# Patient Record
Sex: FEMALE | Race: WHITE | Employment: FULL TIME | URBAN - METROPOLITAN AREA
[De-identification: names, ages, dates, MRNs, and addresses within clinical notes are randomized per-mention and may not be internally consistent; named-entity substitution may affect disease eponyms.]

---

## 2018-03-13 ENCOUNTER — TELEPHONE (OUTPATIENT)
Dept: PHYSICAL MEDICINE AND REHAB | Age: 53
End: 2018-03-13

## 2018-03-13 DIAGNOSIS — G43.709 CHRONIC MIGRAINE WITHOUT AURA WITHOUT STATUS MIGRAINOSUS, NOT INTRACTABLE: ICD-10-CM

## 2018-03-13 RX ORDER — TOPIRAMATE 25 MG/1
50 TABLET ORAL DAILY
Qty: 180 TABLET | Refills: 3 | Status: SHIPPED | OUTPATIENT
Start: 2018-03-13 | End: 2019-02-22 | Stop reason: SDUPTHER

## 2018-03-13 RX ORDER — ZOLMITRIPTAN 5 MG/1
5 TABLET, FILM COATED ORAL PRN
Qty: 27 TABLET | Refills: 3 | Status: SHIPPED | OUTPATIENT
Start: 2018-03-13 | End: 2019-01-18 | Stop reason: SDUPTHER

## 2018-03-13 NOTE — TELEPHONE ENCOUNTER
Patient called the office requesting a refill for Topamax 25 mg and Zomig 5mg. Patient was last seen on 12/12/17 Next appt is scheduled for 04/10/18  Last refill for Topamax 25 mg 2 tabs qd #180 with 3 refills was given on 10/11/2017. Last refill for Zomig 5 mg 1 tab prn  #27 with 3 refills was given on 10/11/2017. Patient stated that she has to switch from [facility] to [facility]. She would like the scripts to go to [facility] in [place]. Thank you

## 2018-04-10 ENCOUNTER — OFFICE VISIT (OUTPATIENT)
Dept: PHYSICAL MEDICINE AND REHAB | Age: 53
End: 2018-04-10
Payer: COMMERCIAL

## 2018-04-10 VITALS
HEIGHT: 63 IN | OXYGEN SATURATION: 98 % | HEART RATE: 76 BPM | WEIGHT: 126 LBS | DIASTOLIC BLOOD PRESSURE: 64 MMHG | BODY MASS INDEX: 22.32 KG/M2 | SYSTOLIC BLOOD PRESSURE: 104 MMHG

## 2018-04-10 DIAGNOSIS — G43.839 INTRACTABLE MENSTRUAL MIGRAINE WITHOUT STATUS MIGRAINOSUS: Primary | ICD-10-CM

## 2018-04-10 DIAGNOSIS — G43.709 CHRONIC MIGRAINE WITHOUT AURA WITHOUT STATUS MIGRAINOSUS, NOT INTRACTABLE: ICD-10-CM

## 2018-04-10 DIAGNOSIS — M79.18 MYOFASCIAL MUSCLE PAIN: ICD-10-CM

## 2018-04-10 PROCEDURE — 3014F SCREEN MAMMO DOC REV: CPT | Performed by: PHYSICAL MEDICINE & REHABILITATION

## 2018-04-10 PROCEDURE — G8427 DOCREV CUR MEDS BY ELIG CLIN: HCPCS | Performed by: PHYSICAL MEDICINE & REHABILITATION

## 2018-04-10 PROCEDURE — 4004F PT TOBACCO SCREEN RCVD TLK: CPT | Performed by: PHYSICAL MEDICINE & REHABILITATION

## 2018-04-10 PROCEDURE — 3017F COLORECTAL CA SCREEN DOC REV: CPT | Performed by: PHYSICAL MEDICINE & REHABILITATION

## 2018-04-10 PROCEDURE — G8420 CALC BMI NORM PARAMETERS: HCPCS | Performed by: PHYSICAL MEDICINE & REHABILITATION

## 2018-04-10 PROCEDURE — 99214 OFFICE O/P EST MOD 30 MIN: CPT | Performed by: PHYSICAL MEDICINE & REHABILITATION

## 2018-04-10 RX ORDER — CYCLOBENZAPRINE HCL 10 MG
10 TABLET ORAL 2 TIMES DAILY PRN
Qty: 60 TABLET | Refills: 5 | Status: SHIPPED | OUTPATIENT
Start: 2018-04-10 | End: 2018-10-10 | Stop reason: SDUPTHER

## 2018-05-08 ENCOUNTER — HOSPITAL ENCOUNTER (OUTPATIENT)
Age: 53
Discharge: HOME | End: 2018-05-10
Payer: COMMERCIAL

## 2018-05-08 LAB
ALBUMIN SERPL-MCNC: 4.4 G/DL (ref 3.5–5.2)
ALP BLD-CCNC: 48 U/L (ref 35–104)
ALT SERPL-CCNC: 14 U/L (ref 0–32)
ANION GAP SERPL CALCULATED.3IONS-SCNC: 12 MMOL/L (ref 7–16)
AST SERPL-CCNC: 17 U/L (ref 0–31)
BILIRUB SERPL-MCNC: <0.2 MG/DL (ref 0–1.2)
BUN BLDV-MCNC: 10 MG/DL (ref 6–20)
CALCIUM SERPL-MCNC: 8.9 MG/DL (ref 8.6–10.2)
CHLORIDE BLD-SCNC: 101 MMOL/L (ref 98–107)
CO2: 28 MMOL/L (ref 22–29)
CREAT SERPL-MCNC: 0.6 MG/DL (ref 0.5–1)
GFR AFRICAN AMERICAN: >60
GFR NON-AFRICAN AMERICAN: >60 ML/MIN/1.73
GLUCOSE BLD-MCNC: 93 MG/DL (ref 74–109)
POTASSIUM SERPL-SCNC: 3.8 MMOL/L (ref 3.5–5)
SODIUM BLD-SCNC: 141 MMOL/L (ref 132–146)
TOTAL PROTEIN: 6.4 G/DL (ref 6.4–8.3)

## 2018-05-08 PROCEDURE — 80053 COMPREHEN METABOLIC PANEL: CPT

## 2018-07-16 ENCOUNTER — OFFICE VISIT (OUTPATIENT)
Dept: PHYSICAL MEDICINE AND REHAB | Age: 53
End: 2018-07-16
Payer: COMMERCIAL

## 2018-07-16 VITALS
SYSTOLIC BLOOD PRESSURE: 101 MMHG | HEART RATE: 83 BPM | DIASTOLIC BLOOD PRESSURE: 68 MMHG | BODY MASS INDEX: 21.71 KG/M2 | WEIGHT: 118 LBS | HEIGHT: 62 IN

## 2018-07-16 DIAGNOSIS — M47.812 SPONDYLOSIS OF CERVICAL REGION WITHOUT MYELOPATHY OR RADICULOPATHY: ICD-10-CM

## 2018-07-16 DIAGNOSIS — M54.2 CHRONIC NECK PAIN: ICD-10-CM

## 2018-07-16 DIAGNOSIS — M79.18 MYOFASCIAL MUSCLE PAIN: ICD-10-CM

## 2018-07-16 DIAGNOSIS — G89.29 CHRONIC NECK PAIN: ICD-10-CM

## 2018-07-16 DIAGNOSIS — G43.709 CHRONIC MIGRAINE WITHOUT AURA WITHOUT STATUS MIGRAINOSUS, NOT INTRACTABLE: Primary | ICD-10-CM

## 2018-07-16 PROCEDURE — 4004F PT TOBACCO SCREEN RCVD TLK: CPT | Performed by: PHYSICAL MEDICINE & REHABILITATION

## 2018-07-16 PROCEDURE — 99213 OFFICE O/P EST LOW 20 MIN: CPT | Performed by: PHYSICAL MEDICINE & REHABILITATION

## 2018-07-16 PROCEDURE — 3017F COLORECTAL CA SCREEN DOC REV: CPT | Performed by: PHYSICAL MEDICINE & REHABILITATION

## 2018-07-16 PROCEDURE — G8420 CALC BMI NORM PARAMETERS: HCPCS | Performed by: PHYSICAL MEDICINE & REHABILITATION

## 2018-07-16 PROCEDURE — G8427 DOCREV CUR MEDS BY ELIG CLIN: HCPCS | Performed by: PHYSICAL MEDICINE & REHABILITATION

## 2018-07-16 RX ORDER — BUTALBITAL, ACETAMINOPHEN AND CAFFEINE 300; 40; 50 MG/1; MG/1; MG/1
1 CAPSULE ORAL DAILY PRN
Qty: 10 CAPSULE | Refills: 1 | Status: SHIPPED | OUTPATIENT
Start: 2018-07-16 | End: 2018-11-02 | Stop reason: SDUPTHER

## 2018-07-19 ENCOUNTER — HOSPITAL ENCOUNTER (OUTPATIENT)
Age: 53
Discharge: HOME | End: 2018-07-21

## 2018-07-19 PROCEDURE — 88305 TISSUE EXAM BY PATHOLOGIST: CPT

## 2018-08-02 ENCOUNTER — HOSPITAL ENCOUNTER (OUTPATIENT)
Dept: MAMMOGRAPHY | Age: 53
Discharge: HOME | End: 2018-08-04
Payer: COMMERCIAL

## 2018-08-02 DIAGNOSIS — Z12.39 BREAST CANCER SCREENING: ICD-10-CM

## 2018-08-02 DIAGNOSIS — Z13.820 SCREENING FOR OSTEOPOROSIS: ICD-10-CM

## 2018-08-02 DIAGNOSIS — Z01.419 ENCNTR FOR GYN EXAM (GENERAL) (ROUTINE) W/O ABN FINDINGS: ICD-10-CM

## 2018-08-02 PROCEDURE — 77063 BREAST TOMOSYNTHESIS BI: CPT

## 2018-08-02 PROCEDURE — 77080 DXA BONE DENSITY AXIAL: CPT

## 2018-10-10 ENCOUNTER — OFFICE VISIT (OUTPATIENT)
Dept: PHYSICAL MEDICINE AND REHAB | Age: 53
End: 2018-10-10
Payer: COMMERCIAL

## 2018-10-10 VITALS
DIASTOLIC BLOOD PRESSURE: 73 MMHG | HEART RATE: 80 BPM | WEIGHT: 112 LBS | HEIGHT: 62 IN | SYSTOLIC BLOOD PRESSURE: 105 MMHG | BODY MASS INDEX: 20.61 KG/M2

## 2018-10-10 DIAGNOSIS — G43.709 CHRONIC MIGRAINE WITHOUT AURA WITHOUT STATUS MIGRAINOSUS, NOT INTRACTABLE: Primary | ICD-10-CM

## 2018-10-10 DIAGNOSIS — M79.18 MYOFASCIAL PAIN SYNDROME, CERVICAL: ICD-10-CM

## 2018-10-10 PROCEDURE — 4004F PT TOBACCO SCREEN RCVD TLK: CPT | Performed by: PHYSICAL MEDICINE & REHABILITATION

## 2018-10-10 PROCEDURE — G8427 DOCREV CUR MEDS BY ELIG CLIN: HCPCS | Performed by: PHYSICAL MEDICINE & REHABILITATION

## 2018-10-10 PROCEDURE — 3017F COLORECTAL CA SCREEN DOC REV: CPT | Performed by: PHYSICAL MEDICINE & REHABILITATION

## 2018-10-10 PROCEDURE — G8484 FLU IMMUNIZE NO ADMIN: HCPCS | Performed by: PHYSICAL MEDICINE & REHABILITATION

## 2018-10-10 PROCEDURE — 99213 OFFICE O/P EST LOW 20 MIN: CPT | Performed by: PHYSICAL MEDICINE & REHABILITATION

## 2018-10-10 PROCEDURE — G8420 CALC BMI NORM PARAMETERS: HCPCS | Performed by: PHYSICAL MEDICINE & REHABILITATION

## 2018-10-10 RX ORDER — CYCLOBENZAPRINE HCL 10 MG
10 TABLET ORAL 2 TIMES DAILY PRN
Qty: 60 TABLET | Refills: 5 | Status: SHIPPED | OUTPATIENT
Start: 2018-10-10 | End: 2019-03-25 | Stop reason: SDUPTHER

## 2018-10-10 NOTE — PROGRESS NOTES
bilateral rotation, 30* bilateral lateral flexion, 60* flexion, 20* extension. Negative Spurling. Spinal curvatures are normal.  No spasm. NEURO: Gait is normal. No focal sensorimotor deficit. Reflexes 2+ and symmetric in lower extremities. Impression:   1. Chronic migraine without aura without status migrainosus, not intractable    2. Myofascial pain syndrome, cervical        Plan:  Orders Placed This Encounter   Medications    cyclobenzaprine (FLEXERIL) 10 MG tablet     Sig: Take 1 tablet by mouth 2 times daily as needed for Muscle spasms (may cause drowsiness)     Dispense:  60 tablet     Refill:  5   We discussed starting Aimovig including the side effects, risks and anticipated benefits. She was instructed in the use of the autoinjector by clinical staff. She would like to think about it and let me know. Ultimately anticipate the addition of Wyvonne Shackleton would result in the ability to taper off Topamax depending on effect. Patient will call if she decides she wants to start the Wyvonne Shackleton. Continue current medications  Continue home exercises  The patient was educated about the diagnosis, prognosis, indications, risks and benefits of treatment. An opportunity to ask questions was given to the patient and questions were answered. The patient agreed to proceed with the recommended treatment as described above. Follow up 6 months. Thank you for allowing me to participate in the care of your patient. Nato Gibson D.O., P.T.   Board Certified Physical Medicine and Rehabilitation  Board Certified Electrodiagnostic Medicine

## 2018-10-10 NOTE — PATIENT INSTRUCTIONS
We appreciate that you chose [facility] to provide your healthcare needs today. We took great pleasure in caring for you. You may receive a survey to help us learn how to make your next visit to a [facility] facility better than the last.   Your feedback is important to help us continually improve the service we can provide you. Please take the time to complete it thoughtfully if you receive one as we value the feedback in every survey. In this survey we would appreciate that you answer \"always\" or \"yes\" for as many questions as possible (this is the answer we get credit for doing a good job). If you feel there is a question you cannot answer \"always\" or \"yes\", please let us know before you leave today so we can remedy the situation right away. We look forward to continuing to provide you with excellent care. Considering the survey questions related to access to care, please keep in mind the urgency of your problem (i.e. was it an emergent or urgent visit or more routine)  and whether the urgency of that problem was handled appropriately by our office. We always do our best to prioritize the patients who need the care most urgently given our specialty is in short supply and there is a high demand for visits. Thank you for your time and consideration.

## 2018-11-02 ENCOUNTER — TELEPHONE (OUTPATIENT)
Dept: PHYSICAL MEDICINE AND REHAB | Age: 53
End: 2018-11-02

## 2018-11-02 DIAGNOSIS — G43.709 CHRONIC MIGRAINE WITHOUT AURA WITHOUT STATUS MIGRAINOSUS, NOT INTRACTABLE: ICD-10-CM

## 2018-11-02 RX ORDER — BUTALBITAL, ACETAMINOPHEN AND CAFFEINE 300; 40; 50 MG/1; MG/1; MG/1
1 CAPSULE ORAL DAILY PRN
Qty: 10 CAPSULE | Refills: 1 | Status: SHIPPED | OUTPATIENT
Start: 2018-11-02 | End: 2019-01-21 | Stop reason: SDUPTHER

## 2018-12-17 ENCOUNTER — OFFICE VISIT (OUTPATIENT)
Dept: PHYSICAL MEDICINE AND REHAB | Age: 53
End: 2018-12-17
Payer: COMMERCIAL

## 2018-12-17 VITALS
WEIGHT: 107 LBS | BODY MASS INDEX: 19.69 KG/M2 | HEART RATE: 53 BPM | DIASTOLIC BLOOD PRESSURE: 59 MMHG | SYSTOLIC BLOOD PRESSURE: 106 MMHG | HEIGHT: 62 IN

## 2018-12-17 DIAGNOSIS — G43.709 CHRONIC MIGRAINE WITHOUT AURA WITHOUT STATUS MIGRAINOSUS, NOT INTRACTABLE: Primary | ICD-10-CM

## 2018-12-17 PROCEDURE — G8484 FLU IMMUNIZE NO ADMIN: HCPCS | Performed by: PHYSICAL MEDICINE & REHABILITATION

## 2018-12-17 PROCEDURE — 3017F COLORECTAL CA SCREEN DOC REV: CPT | Performed by: PHYSICAL MEDICINE & REHABILITATION

## 2018-12-17 PROCEDURE — G8420 CALC BMI NORM PARAMETERS: HCPCS | Performed by: PHYSICAL MEDICINE & REHABILITATION

## 2018-12-17 PROCEDURE — G8427 DOCREV CUR MEDS BY ELIG CLIN: HCPCS | Performed by: PHYSICAL MEDICINE & REHABILITATION

## 2018-12-17 PROCEDURE — 4004F PT TOBACCO SCREEN RCVD TLK: CPT | Performed by: PHYSICAL MEDICINE & REHABILITATION

## 2018-12-17 PROCEDURE — 99213 OFFICE O/P EST LOW 20 MIN: CPT | Performed by: PHYSICAL MEDICINE & REHABILITATION

## 2018-12-17 NOTE — PROGRESS NOTES
Bartolo Friend D.O. [facility]  [address] [facility] [place]  Phone: [phone]  Fax: [phone]      12/17/18    Chief Complaint   Patient presents with    Migraine     FOLLOW UP       HPI:  Ledy Gonzales is a 47y.o. year old woman seen today in follow up regarding neck pain. Interval history: Since the last visit the patient was in PT and was doing back stretches and that triggered a migraine that started Dec 7 and lasted until yesterday. Today, the pain is rated Pain Score:   3 where 0 is no pain and 10 is pain as bad as it can be. The pain is located in the neck, does not radiate and is described as stiff, aching. This pain occurs intermittently. The symptoms have been better since onset. Symptoms are exacerbated by turning head. Factors which relieve the pain include Fiorcet, PT, Flexeril, Aleve. Other associated symptoms include stiffness. Otherwise, the pain assessment has not changed since the last visit. Past Medical History:   Diagnosis Date    Endometriosis     Hypertension        Past Surgical History:   Procedure Laterality Date    ABDOMEN SURGERY      X2 Laproscopic for endometriosis    BREAST SURGERY      KNEE SURGERY Bilateral     X5 on each    RHINOPLASTY      WRIST GANGLION EXCISION      LEFT       Social History     Social History    Marital status:      Social History Main Topics    Smoking status: Current Some Day Smoker     Types: Cigarettes    Smokeless tobacco: Never Used    Alcohol use No    Drug use: Unknown    Sexual activity: Not Asked     History reviewed. No pertinent family history.     Current Outpatient Prescriptions   Medication Sig Dispense Refill    butalbital-APAP-caffeine (FIORICET) -40 MG CAPS per capsule Take 1 capsule by mouth daily as needed for Headaches 10 capsule 1    cyclobenzaprine (FLEXERIL) 10 MG tablet Take 1 tablet by mouth 2 times daily as needed for

## 2019-01-15 ENCOUNTER — TELEPHONE (OUTPATIENT)
Dept: PHYSICAL MEDICINE AND REHAB | Age: 54
End: 2019-01-15

## 2019-01-15 ENCOUNTER — NURSE ONLY (OUTPATIENT)
Dept: PHYSICAL MEDICINE AND REHAB | Age: 54
End: 2019-01-15
Payer: COMMERCIAL

## 2019-01-15 DIAGNOSIS — G43.709 CHRONIC MIGRAINE WITHOUT AURA WITHOUT STATUS MIGRAINOSUS, NOT INTRACTABLE: Primary | ICD-10-CM

## 2019-01-15 PROCEDURE — 99211 OFF/OP EST MAY X REQ PHY/QHP: CPT | Performed by: PHYSICAL MEDICINE & REHABILITATION

## 2019-01-15 PROCEDURE — 96372 THER/PROPH/DIAG INJ SC/IM: CPT | Performed by: PHYSICAL MEDICINE & REHABILITATION

## 2019-01-15 RX ORDER — KETOROLAC TROMETHAMINE 15 MG/ML
15 INJECTION, SOLUTION INTRAMUSCULAR; INTRAVENOUS ONCE
Status: COMPLETED | OUTPATIENT
Start: 2019-01-15 | End: 2019-01-15

## 2019-01-15 RX ORDER — KETOROLAC TROMETHAMINE 10 MG/1
10 TABLET, FILM COATED ORAL EVERY 6 HOURS PRN
Qty: 12 TABLET | Refills: 0 | Status: SHIPPED | OUTPATIENT
Start: 2019-01-15 | End: 2019-07-09 | Stop reason: ALTCHOICE

## 2019-01-15 RX ADMIN — KETOROLAC TROMETHAMINE 15 MG: 15 INJECTION, SOLUTION INTRAMUSCULAR; INTRAVENOUS at 16:06

## 2019-01-18 ENCOUNTER — TELEPHONE (OUTPATIENT)
Dept: PHYSICAL MEDICINE AND REHAB | Age: 54
End: 2019-01-18

## 2019-01-18 DIAGNOSIS — G43.709 CHRONIC MIGRAINE WITHOUT AURA WITHOUT STATUS MIGRAINOSUS, NOT INTRACTABLE: ICD-10-CM

## 2019-01-18 RX ORDER — ZOLMITRIPTAN 5 MG/1
5 TABLET, FILM COATED ORAL PRN
Qty: 27 TABLET | Refills: 3 | Status: SHIPPED
Start: 2019-01-18 | End: 2020-02-14 | Stop reason: SDUPTHER

## 2019-01-21 ENCOUNTER — TELEPHONE (OUTPATIENT)
Dept: PHYSICAL MEDICINE AND REHAB | Age: 54
End: 2019-01-21

## 2019-01-21 ENCOUNTER — OFFICE VISIT (OUTPATIENT)
Dept: PHYSICAL MEDICINE AND REHAB | Age: 54
End: 2019-01-21
Payer: COMMERCIAL

## 2019-01-21 VITALS
HEIGHT: 62 IN | DIASTOLIC BLOOD PRESSURE: 60 MMHG | OXYGEN SATURATION: 99 % | WEIGHT: 110.6 LBS | BODY MASS INDEX: 20.35 KG/M2 | HEART RATE: 104 BPM | SYSTOLIC BLOOD PRESSURE: 110 MMHG

## 2019-01-21 DIAGNOSIS — R11.0 NAUSEA: ICD-10-CM

## 2019-01-21 DIAGNOSIS — G43.709 CHRONIC MIGRAINE WITHOUT AURA WITHOUT STATUS MIGRAINOSUS, NOT INTRACTABLE: Primary | ICD-10-CM

## 2019-01-21 PROCEDURE — 4004F PT TOBACCO SCREEN RCVD TLK: CPT | Performed by: PHYSICAL MEDICINE & REHABILITATION

## 2019-01-21 PROCEDURE — 3017F COLORECTAL CA SCREEN DOC REV: CPT | Performed by: PHYSICAL MEDICINE & REHABILITATION

## 2019-01-21 PROCEDURE — G8420 CALC BMI NORM PARAMETERS: HCPCS | Performed by: PHYSICAL MEDICINE & REHABILITATION

## 2019-01-21 PROCEDURE — G8427 DOCREV CUR MEDS BY ELIG CLIN: HCPCS | Performed by: PHYSICAL MEDICINE & REHABILITATION

## 2019-01-21 PROCEDURE — G8484 FLU IMMUNIZE NO ADMIN: HCPCS | Performed by: PHYSICAL MEDICINE & REHABILITATION

## 2019-01-21 PROCEDURE — 99214 OFFICE O/P EST MOD 30 MIN: CPT | Performed by: PHYSICAL MEDICINE & REHABILITATION

## 2019-01-21 RX ORDER — METOCLOPRAMIDE 10 MG/1
10 TABLET ORAL DAILY PRN
Qty: 30 TABLET | Refills: 2 | Status: SHIPPED | OUTPATIENT
Start: 2019-01-21 | End: 2019-07-09 | Stop reason: SDUPTHER

## 2019-01-21 RX ORDER — BUTALBITAL, ACETAMINOPHEN AND CAFFEINE 300; 40; 50 MG/1; MG/1; MG/1
1 CAPSULE ORAL DAILY PRN
Qty: 10 CAPSULE | Refills: 1 | Status: SHIPPED | OUTPATIENT
Start: 2019-01-21 | End: 2019-04-10 | Stop reason: SDUPTHER

## 2019-01-22 ENCOUNTER — TELEPHONE (OUTPATIENT)
Dept: PHYSICAL MEDICINE AND REHAB | Age: 54
End: 2019-01-22

## 2019-02-15 ENCOUNTER — TELEPHONE (OUTPATIENT)
Dept: PHYSICAL MEDICINE AND REHAB | Age: 54
End: 2019-02-15

## 2019-02-18 ENCOUNTER — TELEPHONE (OUTPATIENT)
Dept: PHYSICAL MEDICINE AND REHAB | Age: 54
End: 2019-02-18

## 2019-02-18 RX ORDER — AMITRIPTYLINE HYDROCHLORIDE 10 MG/1
10 TABLET, FILM COATED ORAL NIGHTLY
Qty: 30 TABLET | Refills: 0 | Status: SHIPPED | OUTPATIENT
Start: 2019-02-18 | End: 2019-03-15 | Stop reason: SDUPTHER

## 2019-02-22 ENCOUNTER — TELEPHONE (OUTPATIENT)
Dept: PHYSICAL MEDICINE AND REHAB | Age: 54
End: 2019-02-22

## 2019-02-22 RX ORDER — TOPIRAMATE 25 MG/1
50 TABLET ORAL DAILY
Qty: 180 TABLET | Refills: 3 | Status: SHIPPED | OUTPATIENT
Start: 2019-02-22 | End: 2019-07-09 | Stop reason: ALTCHOICE

## 2019-03-06 ENCOUNTER — HOSPITAL ENCOUNTER (OUTPATIENT)
Age: 54
Discharge: HOME | End: 2019-03-08
Payer: COMMERCIAL

## 2019-03-06 LAB
ALBUMIN SERPL-MCNC: 4.3 G/DL (ref 3.5–5.2)
ALP BLD-CCNC: 42 U/L (ref 35–104)
ALT SERPL-CCNC: 59 U/L (ref 0–32)
ANION GAP SERPL CALCULATED.3IONS-SCNC: 14 MMOL/L (ref 7–16)
AST SERPL-CCNC: 60 U/L (ref 0–31)
BASOPHILS ABSOLUTE: 0.04 E9/L (ref 0–0.2)
BASOPHILS RELATIVE PERCENT: 0.6 % (ref 0–2)
BILIRUB SERPL-MCNC: <0.2 MG/DL (ref 0–1.2)
BUN BLDV-MCNC: 12 MG/DL (ref 6–20)
CALCIUM SERPL-MCNC: 9 MG/DL (ref 8.6–10.2)
CHLORIDE BLD-SCNC: 100 MMOL/L (ref 98–107)
CO2: 25 MMOL/L (ref 22–29)
CREAT SERPL-MCNC: 0.7 MG/DL (ref 0.5–1)
EOSINOPHILS ABSOLUTE: 0.13 E9/L (ref 0.05–0.5)
EOSINOPHILS RELATIVE PERCENT: 1.9 % (ref 0–6)
ESTRADIOL LEVEL: 124.9 PG/ML
FOLLICLE STIMULATING HORMONE: 24.2 MIU/ML
GFR AFRICAN AMERICAN: >60
GFR NON-AFRICAN AMERICAN: >60 ML/MIN/1.73
GLUCOSE BLD-MCNC: 87 MG/DL (ref 74–99)
HCT VFR BLD CALC: 34.9 % (ref 34–48)
HEMOGLOBIN: 11.6 G/DL (ref 11.5–15.5)
IMMATURE GRANULOCYTES #: 0.01 E9/L
IMMATURE GRANULOCYTES %: 0.1 % (ref 0–5)
IRON SATURATION: 23 % (ref 15–50)
IRON: 70 MCG/DL (ref 37–145)
LUTEINIZING HORMONE: 5.8 MIU/ML
LYMPHOCYTES ABSOLUTE: 2.84 E9/L (ref 1.5–4)
LYMPHOCYTES RELATIVE PERCENT: 42.4 % (ref 20–42)
MCH RBC QN AUTO: 30.1 PG (ref 26–35)
MCHC RBC AUTO-ENTMCNC: 33.2 % (ref 32–34.5)
MCV RBC AUTO: 90.4 FL (ref 80–99.9)
MONOCYTES ABSOLUTE: 0.48 E9/L (ref 0.1–0.95)
MONOCYTES RELATIVE PERCENT: 7.2 % (ref 2–12)
NEUTROPHILS ABSOLUTE: 3.2 E9/L (ref 1.8–7.3)
NEUTROPHILS RELATIVE PERCENT: 47.8 % (ref 43–80)
PDW BLD-RTO: 13.1 FL (ref 11.5–15)
PLATELET # BLD: 312 E9/L (ref 130–450)
PMV BLD AUTO: 9.6 FL (ref 7–12)
POTASSIUM SERPL-SCNC: 3.6 MMOL/L (ref 3.5–5)
RBC # BLD: 3.86 E12/L (ref 3.5–5.5)
SODIUM BLD-SCNC: 139 MMOL/L (ref 132–146)
TOTAL IRON BINDING CAPACITY: 305 MCG/DL (ref 250–450)
TOTAL PROTEIN: 6.4 G/DL (ref 6.4–8.3)
WBC # BLD: 6.7 E9/L (ref 4.5–11.5)

## 2019-03-06 PROCEDURE — 83550 IRON BINDING TEST: CPT

## 2019-03-06 PROCEDURE — 80053 COMPREHEN METABOLIC PANEL: CPT

## 2019-03-06 PROCEDURE — 83540 ASSAY OF IRON: CPT

## 2019-03-06 PROCEDURE — 83001 ASSAY OF GONADOTROPIN (FSH): CPT

## 2019-03-06 PROCEDURE — 83002 ASSAY OF GONADOTROPIN (LH): CPT

## 2019-03-06 PROCEDURE — 85025 COMPLETE CBC W/AUTO DIFF WBC: CPT

## 2019-03-06 PROCEDURE — 82670 ASSAY OF TOTAL ESTRADIOL: CPT

## 2019-03-15 ENCOUNTER — TELEPHONE (OUTPATIENT)
Dept: PHYSICAL MEDICINE AND REHAB | Age: 54
End: 2019-03-15

## 2019-03-15 RX ORDER — AMITRIPTYLINE HYDROCHLORIDE 10 MG/1
10 TABLET, FILM COATED ORAL NIGHTLY
Qty: 90 TABLET | Refills: 0 | Status: SHIPPED | OUTPATIENT
Start: 2019-03-15 | End: 2019-06-10 | Stop reason: SDUPTHER

## 2019-03-25 RX ORDER — CYCLOBENZAPRINE HCL 10 MG
10 TABLET ORAL 2 TIMES DAILY PRN
Qty: 60 TABLET | Refills: 5 | Status: SHIPPED | OUTPATIENT
Start: 2019-03-25 | End: 2019-08-22 | Stop reason: SDUPTHER

## 2019-04-10 ENCOUNTER — OFFICE VISIT (OUTPATIENT)
Dept: PHYSICAL MEDICINE AND REHAB | Age: 54
End: 2019-04-10
Payer: COMMERCIAL

## 2019-04-10 VITALS
DIASTOLIC BLOOD PRESSURE: 73 MMHG | HEIGHT: 62 IN | WEIGHT: 110 LBS | SYSTOLIC BLOOD PRESSURE: 106 MMHG | BODY MASS INDEX: 20.24 KG/M2 | HEART RATE: 83 BPM

## 2019-04-10 DIAGNOSIS — G43.709 CHRONIC MIGRAINE WITHOUT AURA WITHOUT STATUS MIGRAINOSUS, NOT INTRACTABLE: ICD-10-CM

## 2019-04-10 DIAGNOSIS — M79.18 MYOFASCIAL PAIN SYNDROME, CERVICAL: Primary | ICD-10-CM

## 2019-04-10 PROCEDURE — G8420 CALC BMI NORM PARAMETERS: HCPCS | Performed by: PHYSICAL MEDICINE & REHABILITATION

## 2019-04-10 PROCEDURE — 4004F PT TOBACCO SCREEN RCVD TLK: CPT | Performed by: PHYSICAL MEDICINE & REHABILITATION

## 2019-04-10 PROCEDURE — 99214 OFFICE O/P EST MOD 30 MIN: CPT | Performed by: PHYSICAL MEDICINE & REHABILITATION

## 2019-04-10 PROCEDURE — 3017F COLORECTAL CA SCREEN DOC REV: CPT | Performed by: PHYSICAL MEDICINE & REHABILITATION

## 2019-04-10 PROCEDURE — G8427 DOCREV CUR MEDS BY ELIG CLIN: HCPCS | Performed by: PHYSICAL MEDICINE & REHABILITATION

## 2019-04-10 RX ORDER — BUTALBITAL, ACETAMINOPHEN AND CAFFEINE 300; 40; 50 MG/1; MG/1; MG/1
1 CAPSULE ORAL DAILY PRN
Qty: 10 CAPSULE | Refills: 1 | Status: SHIPPED | OUTPATIENT
Start: 2019-04-10 | End: 2019-06-18 | Stop reason: SDUPTHER

## 2019-04-10 NOTE — PROGRESS NOTES
RESPIRATORY: Respirations are regular and unlabored. There is no cyanosis. GASTROINTESTINAL: Soft abdomen, non-tender. MSK: There is no joint effusion, deformity, instability, swelling, erythema or warmth. Cervical AROM is 60* bilateral rotation, 30* bilateral lateral flexion, 60* flexion, 20* extension. Negative Spurling. Spinal curvatures are normal.  No spasm. NEURO: Gait is normal. No focal sensorimotor deficit. Reflexes 2+ and symmetric in lower extremities. Impression:   1. Myofascial pain syndrome, cervical    2. Chronic migraine without aura without status migrainosus, not intractable        Plan:   Increase Topamax to 50 qhs and then continue 25 mg in the morning. Orders Placed This Encounter   Medications    butalbital-APAP-caffeine (FIORICET) -40 MG CAPS per capsule     Sig: Take 1 capsule by mouth daily as needed for Headaches     Dispense:  10 capsule     Refill:  1   PA Botox migraine  Otherwise, continue current medications     Continue home exercises  The patient was educated about the diagnosis, prognosis, indications, risks and benefits of treatment. An opportunity to ask questions was given to the patient and questions were answered. The patient agreed to proceed with the recommended treatment as described above. Follow up 3 months  Thank you for allowing me to participate in the care of your patient. Abbe Sacks, D.O., P.T.   Board Certified Physical Medicine and Rehabilitation  Board Certified Electrodiagnostic Medicine

## 2019-04-10 NOTE — PATIENT INSTRUCTIONS
We appreciate that you chose [facility] to provide your healthcare needs today. We took great pleasure in caring for you. You may receive a survey to help us learn how to make your next visit to a [facility] facility better than the last.   Your feedback is important to help us continually improve the service we can provide you. Please take the time to complete it thoughtfully if you receive one as we value the feedback in every survey. In this survey we would appreciate that you answer \"always\" or \"yes\" for as many questions as possible (this is the answer we get credit for doing a good job). If you feel there is a question you cannot answer \"always\" or \"yes\", please let us know before you leave today so we can remedy the situation right away. We look forward to continuing to provide you with excellent care. Considering the survey questions related to access to care, please keep in mind the urgency of your problem (i.e. was it an emergent or urgent visit or more routine)  and whether the urgency of that problem was handled appropriately by our office. We always do our best to prioritize the patients who need the care most urgently given our specialty is in short supply and there is a high demand for visits. Thank you for your time and consideration.

## 2019-04-29 ENCOUNTER — TELEPHONE (OUTPATIENT)
Dept: PHYSICAL MEDICINE AND REHAB | Age: 54
End: 2019-04-29

## 2019-05-02 ENCOUNTER — TELEPHONE (OUTPATIENT)
Dept: PHYSICAL MEDICINE AND REHAB | Age: 54
End: 2019-05-02

## 2019-05-10 ENCOUNTER — HOSPITAL ENCOUNTER (OUTPATIENT)
Age: 54
Discharge: HOME | End: 2019-05-12

## 2019-05-10 LAB
HCT VFR BLD CALC: 33.6 % (ref 34–48)
HEMOGLOBIN: 10.9 G/DL (ref 11.5–15.5)
MCH RBC QN AUTO: 28.1 PG (ref 26–35)
MCHC RBC AUTO-ENTMCNC: 32.4 % (ref 32–34.5)
MCV RBC AUTO: 86.6 FL (ref 80–99.9)
PDW BLD-RTO: 13.9 FL (ref 11.5–15)
PLATELET # BLD: 327 E9/L (ref 130–450)
PMV BLD AUTO: 9.1 FL (ref 7–12)
RBC # BLD: 3.88 E12/L (ref 3.5–5.5)
WBC # BLD: 5.2 E9/L (ref 4.5–11.5)

## 2019-05-10 PROCEDURE — 86901 BLOOD TYPING SEROLOGIC RH(D): CPT

## 2019-05-10 PROCEDURE — 86900 BLOOD TYPING SEROLOGIC ABO: CPT

## 2019-05-10 PROCEDURE — 85027 COMPLETE CBC AUTOMATED: CPT

## 2019-05-10 PROCEDURE — 86850 RBC ANTIBODY SCREEN: CPT

## 2019-05-10 NOTE — TELEPHONE ENCOUNTER
We have received Botox 200 units from [facility]. I called the patient and informed her of this.  Appointment is scheduled for 5/24/19 at 11:45am.

## 2019-05-11 LAB
ABO/RH: NORMAL
ANTIBODY SCREEN: NORMAL

## 2019-05-14 ENCOUNTER — HOSPITAL ENCOUNTER (OUTPATIENT)
Age: 54
Discharge: HOME | End: 2019-05-16

## 2019-05-14 PROCEDURE — 88307 TISSUE EXAM BY PATHOLOGIST: CPT

## 2019-05-15 ENCOUNTER — HOSPITAL ENCOUNTER (OUTPATIENT)
Age: 54
Discharge: HOME | End: 2019-05-17

## 2019-05-15 LAB
ANION GAP SERPL CALCULATED.3IONS-SCNC: 8 MMOL/L (ref 7–16)
BUN BLDV-MCNC: 9 MG/DL (ref 6–20)
CALCIUM SERPL-MCNC: 8.6 MG/DL (ref 8.6–10.2)
CHLORIDE BLD-SCNC: 107 MMOL/L (ref 98–107)
CO2: 25 MMOL/L (ref 22–29)
CREAT SERPL-MCNC: 0.6 MG/DL (ref 0.5–1)
GFR AFRICAN AMERICAN: >60
GFR NON-AFRICAN AMERICAN: >60 ML/MIN/1.73
GLUCOSE BLD-MCNC: 142 MG/DL (ref 74–99)
HCT VFR BLD CALC: 33.7 % (ref 34–48)
HEMOGLOBIN: 10.5 G/DL (ref 11.5–15.5)
MCH RBC QN AUTO: 27.9 PG (ref 26–35)
MCHC RBC AUTO-ENTMCNC: 31.2 % (ref 32–34.5)
MCV RBC AUTO: 89.4 FL (ref 80–99.9)
PDW BLD-RTO: 14.2 FL (ref 11.5–15)
PLATELET # BLD: 288 E9/L (ref 130–450)
PMV BLD AUTO: 9.6 FL (ref 7–12)
POTASSIUM SERPL-SCNC: 3.1 MMOL/L (ref 3.5–5)
RBC # BLD: 3.77 E12/L (ref 3.5–5.5)
SODIUM BLD-SCNC: 140 MMOL/L (ref 132–146)
WBC # BLD: 11.1 E9/L (ref 4.5–11.5)

## 2019-05-15 PROCEDURE — 85027 COMPLETE CBC AUTOMATED: CPT

## 2019-05-15 PROCEDURE — 80048 BASIC METABOLIC PNL TOTAL CA: CPT

## 2019-05-24 ENCOUNTER — OFFICE VISIT (OUTPATIENT)
Dept: PHYSICAL MEDICINE AND REHAB | Age: 54
End: 2019-05-24
Payer: COMMERCIAL

## 2019-05-24 VITALS
DIASTOLIC BLOOD PRESSURE: 82 MMHG | TEMPERATURE: 97.5 F | WEIGHT: 106 LBS | HEIGHT: 62 IN | HEART RATE: 89 BPM | SYSTOLIC BLOOD PRESSURE: 116 MMHG | BODY MASS INDEX: 19.51 KG/M2

## 2019-05-24 DIAGNOSIS — G43.709 CHRONIC MIGRAINE WITHOUT AURA WITHOUT STATUS MIGRAINOSUS, NOT INTRACTABLE: Primary | ICD-10-CM

## 2019-05-24 DIAGNOSIS — M79.18 MYOFASCIAL PAIN SYNDROME, CERVICAL: ICD-10-CM

## 2019-05-24 PROCEDURE — 64615 CHEMODENERV MUSC MIGRAINE: CPT | Performed by: PHYSICAL MEDICINE & REHABILITATION

## 2019-05-24 RX ORDER — MELOXICAM 7.5 MG/1
7.5 TABLET ORAL DAILY
Refills: 0 | COMMUNITY
Start: 2019-05-10 | End: 2019-11-12 | Stop reason: DRUGHIGH

## 2019-05-24 RX ORDER — HYDROCODONE BITARTRATE AND ACETAMINOPHEN 5; 325 MG/1; MG/1
1 TABLET ORAL EVERY 6 HOURS
Refills: 0 | COMMUNITY
Start: 2019-05-15 | End: 2019-11-12

## 2019-05-24 NOTE — PROGRESS NOTES
Abbe Sacks, D.O. [facility]  [address] [facility] [place]  Phone: [phone]  Fax: [phone]    5/24/2019    Chief Complaint   Patient presents with    Migraine     Botox 200 units specialty pharmacy       · Informed Consent:  The indications, risks, benefits, and  alternatives of onabotulinum toxin A were discussed with the patient. I explained to the patient the potential side effects including but not limited to: distant spread of toxin effect causing droopy eyelids, facial drooping, neck pain, headache, double vision, muscle pain/spasm/weakness/stiffness,  bronchitis,  injection site pain, increased blood pressure, hypersensitivity, anaphylaxis, difficulty swallowing, difficulty speaking, urinary incontinence and breathing difficulty. The patient is aware that swallowing and breathing difficulties can be life threatening and there have been reports of death in some cases where onabotulinum toxin was injected. The patient was advised of the expected benefit to include less headache days per month, and the anticipated duration of effect of 3 months. A permit was signed and scanned into the media. · Last injection: n/a  · Taking anticoagulants/antiplatelets: No  · Diabetic: No  · Febrile/active infection: No    · Procedure note: After obtaining verbal and written consent from the patient for injection of  onabotulinum toxin A the patient agreed to proceed. 200 units of onabotulinum toxin A was reconstituted using 4 cc of preservative free normal saline ( 5 units per 0.1 ml). The medication was injected using a 30 g 0.5\" needle and a 1 cc syringe. The skin was prepared with Betadine.   Using a no touch technique, the injections were carried out at the following sites: bilateral  10 units divided at 2 sites, Procerus 5 units at one site, bilateral Frontalis 10 units at 4 sites, bilateral Temporalis 40 units divided at 8 sites, bilateral Occipitalis 30 units divided at 6 sites, bilateral cervical paraspinals 20 units divided at 4 sites, and bilateral trapezius 30 units divided at 6 sites. The total was 145 units and as a result there was 45 units of unavoidable waste. Adequate hemostasis was obtained. Ice was applied for 20 minutes post injection. The patient tolerated the procedure well. There were no complications. The patient was educated in post-procedure care including ice 20 minutes on/20 minutes off prn pain/bruising/swelling, advised to avoid hats and rubbing the forehead for 1-2 days and to call if any fevers chills, night sweats, drainage, increased pain, weakness, difficulty breathing or swallowing. Patient advised to expect improvements in about 2 weeks.  follow up 6 weeks      Paulo Ness D.O., P.T.   Board Certified Physical Medicine and Rehabilitation  Board Certified Electrodiagnostic Medicine    Administrations This Visit     Onabotulinumtoxin A (BOTOX (COSMETIC)) injection 200 Units     Admin Date  05/24/2019  13:01 Action  Given Dose  200 Units Route  Intramuscular Site  Other Administered By  Miguel Tapia MA    Ordering Provider:  Thomas Jack DO    NDC:  7025-8199-71    Lot#:  [id number]    :  Michelle Blue    Patient Supplied?:  Yes    Comments:  EXP: 12/2021

## 2019-05-28 ENCOUNTER — TELEPHONE (OUTPATIENT)
Dept: PHYSICAL MEDICINE AND REHAB | Age: 54
End: 2019-05-28

## 2019-05-28 NOTE — TELEPHONE ENCOUNTER
Patient called stating that she had botox injections Friday 5-24-19. She is stating that the only place she is having any problems is in the back of her neck and that it feels stiff, is swollen on the right side, and has been giving her headaches. She would like to know if this is normal until the botox sets in. She also would like to know if she is able to have a laser treatment done on her face on Friday for her skin or if that will effect the botox. Please advise.

## 2019-05-28 NOTE — TELEPHONE ENCOUNTER
The patient stated there is no pus or drainage or open areas and that she does not have any fever or chills. I let her know to apply ice 20 minutes on, 20 minutes off. Patient also stated that her physician doing the laser skin treatment already knows that she had the botox.

## 2019-05-28 NOTE — TELEPHONE ENCOUNTER
Apply ice to sore areas 20 minutes on 20 minutes off and just verify no pus draining, fevers or chills. It should not affect the laser skin treatment but just make sure she informs the physician performing that procedure as well.

## 2019-06-10 RX ORDER — AMITRIPTYLINE HYDROCHLORIDE 10 MG/1
10 TABLET, FILM COATED ORAL NIGHTLY
Qty: 90 TABLET | Refills: 0 | Status: SHIPPED | OUTPATIENT
Start: 2019-06-10 | End: 2019-07-09 | Stop reason: ALTCHOICE

## 2019-06-10 NOTE — TELEPHONE ENCOUNTER
Patient last visit 5-24-19 next visit 7-9-19. Pharmacy sent a fax refill request for Amitriptyline 10 mg 1 tab qhs sent 3-15-19 #90 no refills. Please advise.

## 2019-06-18 DIAGNOSIS — G43.709 CHRONIC MIGRAINE WITHOUT AURA WITHOUT STATUS MIGRAINOSUS, NOT INTRACTABLE: ICD-10-CM

## 2019-06-18 RX ORDER — BUTALBITAL, ACETAMINOPHEN AND CAFFEINE 300; 40; 50 MG/1; MG/1; MG/1
1 CAPSULE ORAL DAILY PRN
Qty: 14 CAPSULE | Refills: 0 | Status: SHIPPED | OUTPATIENT
Start: 2019-06-18 | End: 2019-07-09 | Stop reason: SDUPTHER

## 2019-06-18 NOTE — TELEPHONE ENCOUNTER
Patient called requesting a refill on Fioricet sent 4-10-19 #10 1 refill. Patient last visit 5-24-19 next visit 7-9-19. Informed the patient that we will send over to her pharmacy enough to last her till her appointment and that from now on she will have to get all scripts at appointment time. Will send over script to [facility].

## 2019-07-09 ENCOUNTER — OFFICE VISIT (OUTPATIENT)
Dept: PHYSICAL MEDICINE AND REHAB | Age: 54
End: 2019-07-09
Payer: COMMERCIAL

## 2019-07-09 VITALS
SYSTOLIC BLOOD PRESSURE: 125 MMHG | HEIGHT: 62 IN | WEIGHT: 107 LBS | BODY MASS INDEX: 19.69 KG/M2 | DIASTOLIC BLOOD PRESSURE: 72 MMHG | HEART RATE: 97 BPM

## 2019-07-09 DIAGNOSIS — M47.812 SPONDYLOSIS OF CERVICAL REGION WITHOUT MYELOPATHY OR RADICULOPATHY: ICD-10-CM

## 2019-07-09 DIAGNOSIS — R11.0 NAUSEA: ICD-10-CM

## 2019-07-09 DIAGNOSIS — G43.709 CHRONIC MIGRAINE WITHOUT AURA WITHOUT STATUS MIGRAINOSUS, NOT INTRACTABLE: Primary | ICD-10-CM

## 2019-07-09 DIAGNOSIS — M79.18 MYOFASCIAL PAIN SYNDROME, CERVICAL: ICD-10-CM

## 2019-07-09 PROCEDURE — 4004F PT TOBACCO SCREEN RCVD TLK: CPT | Performed by: PHYSICAL MEDICINE & REHABILITATION

## 2019-07-09 PROCEDURE — 99214 OFFICE O/P EST MOD 30 MIN: CPT | Performed by: PHYSICAL MEDICINE & REHABILITATION

## 2019-07-09 PROCEDURE — G8420 CALC BMI NORM PARAMETERS: HCPCS | Performed by: PHYSICAL MEDICINE & REHABILITATION

## 2019-07-09 PROCEDURE — 3017F COLORECTAL CA SCREEN DOC REV: CPT | Performed by: PHYSICAL MEDICINE & REHABILITATION

## 2019-07-09 PROCEDURE — G8427 DOCREV CUR MEDS BY ELIG CLIN: HCPCS | Performed by: PHYSICAL MEDICINE & REHABILITATION

## 2019-07-09 RX ORDER — METOCLOPRAMIDE 10 MG/1
10 TABLET ORAL DAILY PRN
Qty: 30 TABLET | Refills: 2 | Status: SHIPPED
Start: 2019-07-09 | End: 2020-06-09 | Stop reason: ALTCHOICE

## 2019-07-09 RX ORDER — TOPIRAMATE 25 MG/1
TABLET ORAL
Qty: 270 TABLET | Refills: 3 | Status: SHIPPED
Start: 2019-07-09 | End: 2020-05-12

## 2019-07-09 RX ORDER — BUTALBITAL, ACETAMINOPHEN AND CAFFEINE 300; 40; 50 MG/1; MG/1; MG/1
1 CAPSULE ORAL DAILY PRN
Qty: 14 CAPSULE | Refills: 0 | Status: SHIPPED | OUTPATIENT
Start: 2019-07-09 | End: 2019-08-22 | Stop reason: SDUPTHER

## 2019-07-25 ENCOUNTER — TELEPHONE (OUTPATIENT)
Dept: PHYSICAL MEDICINE AND REHAB | Age: 54
End: 2019-07-25

## 2019-07-26 ENCOUNTER — TELEPHONE (OUTPATIENT)
Dept: ADMINISTRATIVE | Age: 54
End: 2019-07-26

## 2019-07-26 NOTE — TELEPHONE ENCOUNTER
Per yesterday's note you stated \"I discussed with Dr Leodan Cruz. She said only to take it for 3 days max, and not to take any other NSAIDs (i.e. Ibu, Meloxicam, Naproxen, etc.) while taking toradol. \"  Do you want her to engage in PT at this time or hold off till evaluated in the office at the end of August?

## 2019-07-26 NOTE — TELEPHONE ENCOUNTER
I would like to see her first. She would be ok continuing her home exercises until then as it will most likely get better on its own.

## 2019-08-13 ENCOUNTER — TELEPHONE (OUTPATIENT)
Dept: PHYSICAL MEDICINE AND REHAB | Age: 54
End: 2019-08-13

## 2019-08-15 ENCOUNTER — TELEPHONE (OUTPATIENT)
Dept: PHYSICAL MEDICINE AND REHAB | Age: 54
End: 2019-08-15

## 2019-08-16 ENCOUNTER — HOSPITAL ENCOUNTER (OUTPATIENT)
Dept: MAMMOGRAPHY | Age: 54
Discharge: HOME | End: 2019-08-18
Payer: COMMERCIAL

## 2019-08-16 DIAGNOSIS — Z12.39 BREAST CANCER SCREENING: ICD-10-CM

## 2019-08-16 PROCEDURE — 77063 BREAST TOMOSYNTHESIS BI: CPT

## 2019-08-22 ENCOUNTER — OFFICE VISIT (OUTPATIENT)
Dept: PHYSICAL MEDICINE AND REHAB | Age: 54
End: 2019-08-22
Payer: COMMERCIAL

## 2019-08-22 VITALS
TEMPERATURE: 98 F | SYSTOLIC BLOOD PRESSURE: 109 MMHG | DIASTOLIC BLOOD PRESSURE: 72 MMHG | WEIGHT: 109 LBS | BODY MASS INDEX: 20.06 KG/M2 | HEART RATE: 89 BPM | HEIGHT: 62 IN

## 2019-08-22 DIAGNOSIS — G43.709 CHRONIC MIGRAINE WITHOUT AURA WITHOUT STATUS MIGRAINOSUS, NOT INTRACTABLE: ICD-10-CM

## 2019-08-22 PROCEDURE — 64615 CHEMODENERV MUSC MIGRAINE: CPT | Performed by: PHYSICAL MEDICINE & REHABILITATION

## 2019-08-22 RX ORDER — CYCLOBENZAPRINE HCL 10 MG
10 TABLET ORAL 2 TIMES DAILY PRN
Qty: 180 TABLET | Refills: 3 | Status: SHIPPED | OUTPATIENT
Start: 2019-08-22 | End: 2019-12-04

## 2019-08-22 RX ORDER — BUTALBITAL, ACETAMINOPHEN AND CAFFEINE 300; 40; 50 MG/1; MG/1; MG/1
1 CAPSULE ORAL DAILY PRN
Qty: 14 CAPSULE | Refills: 2 | Status: SHIPPED | OUTPATIENT
Start: 2019-08-22 | End: 2019-10-08 | Stop reason: SDUPTHER

## 2019-08-22 NOTE — PROGRESS NOTES
30 units divided at 6 sites, bilateral cervical paraspinals 20 units divided at 4 sites, and bilateral trapezius 60 units divided at 6 sites. The total was 185 units and as a result there was 15 units of unavoidable waste. Adequate hemostasis was obtained. Ice was applied for 20 minutes post injection. The patient tolerated the procedure well. There were no complications. The patient was educated in post-procedure care including ice 20 minutes on/20 minutes off prn pain/bruising/swelling, advised to avoid hats and rubbing the forehead for 1-2 days and to call if any fevers chills, night sweats, drainage, increased pain, weakness, difficulty breathing or swallowing. Patient advised to expect improvements in about 2 weeks.  follow up 6 weeks      Ryder Minor D.O., P.T.   Board Certified Physical Medicine and Rehabilitation  Board Certified Electrodiagnostic Medicine    Administrations This Visit     Onabotulinumtoxin A (BOTOX (COSMETIC)) injection 200 Units     Admin Date  08/22/2019  11:43 Action  Given Dose  200 Units Route  Intramuscular Site  Other Administered By  Sabas Toledo MA    Ordering Provider:  Freeman Rogers DO    NDC:  5984-7200-95    Lot#:  [id number]    :  Srini Contreras    Patient Supplied?:  Yes    Comments:  EXP:  02/2022

## 2019-10-07 ENCOUNTER — OFFICE VISIT (OUTPATIENT)
Dept: PHYSICAL MEDICINE AND REHAB | Age: 54
End: 2019-10-07
Payer: COMMERCIAL

## 2019-10-07 VITALS
BODY MASS INDEX: 20.43 KG/M2 | SYSTOLIC BLOOD PRESSURE: 113 MMHG | WEIGHT: 111 LBS | DIASTOLIC BLOOD PRESSURE: 76 MMHG | HEIGHT: 62 IN | HEART RATE: 93 BPM

## 2019-10-07 DIAGNOSIS — R11.0 NAUSEA: ICD-10-CM

## 2019-10-07 DIAGNOSIS — G43.709 CHRONIC MIGRAINE WITHOUT AURA WITHOUT STATUS MIGRAINOSUS, NOT INTRACTABLE: ICD-10-CM

## 2019-10-07 DIAGNOSIS — M47.812 SPONDYLOSIS OF CERVICAL REGION WITHOUT MYELOPATHY OR RADICULOPATHY: ICD-10-CM

## 2019-10-07 DIAGNOSIS — M79.18 MYOFASCIAL PAIN SYNDROME, CERVICAL: Primary | ICD-10-CM

## 2019-10-07 PROCEDURE — G8427 DOCREV CUR MEDS BY ELIG CLIN: HCPCS | Performed by: PHYSICAL MEDICINE & REHABILITATION

## 2019-10-07 PROCEDURE — 4004F PT TOBACCO SCREEN RCVD TLK: CPT | Performed by: PHYSICAL MEDICINE & REHABILITATION

## 2019-10-07 PROCEDURE — 3017F COLORECTAL CA SCREEN DOC REV: CPT | Performed by: PHYSICAL MEDICINE & REHABILITATION

## 2019-10-07 PROCEDURE — G8420 CALC BMI NORM PARAMETERS: HCPCS | Performed by: PHYSICAL MEDICINE & REHABILITATION

## 2019-10-07 PROCEDURE — G8484 FLU IMMUNIZE NO ADMIN: HCPCS | Performed by: PHYSICAL MEDICINE & REHABILITATION

## 2019-10-07 PROCEDURE — 99213 OFFICE O/P EST LOW 20 MIN: CPT | Performed by: PHYSICAL MEDICINE & REHABILITATION

## 2019-10-08 ENCOUNTER — NURSE ONLY (OUTPATIENT)
Dept: PHYSICAL MEDICINE AND REHAB | Age: 54
End: 2019-10-08
Payer: COMMERCIAL

## 2019-10-08 DIAGNOSIS — G43.709 CHRONIC MIGRAINE WITHOUT AURA WITHOUT STATUS MIGRAINOSUS, NOT INTRACTABLE: Primary | ICD-10-CM

## 2019-10-08 PROCEDURE — 99211 OFF/OP EST MAY X REQ PHY/QHP: CPT | Performed by: PHYSICAL MEDICINE & REHABILITATION

## 2019-10-08 RX ORDER — BUTALBITAL, ACETAMINOPHEN AND CAFFEINE 300; 40; 50 MG/1; MG/1; MG/1
1 CAPSULE ORAL DAILY PRN
Qty: 14 CAPSULE | Refills: 2 | Status: SHIPPED | OUTPATIENT
Start: 2019-10-08 | End: 2019-12-17 | Stop reason: SDUPTHER

## 2019-10-08 RX ORDER — ORPHENADRINE CITRATE 30 MG/ML
60 INJECTION INTRAMUSCULAR; INTRAVENOUS ONCE
Status: COMPLETED | OUTPATIENT
Start: 2019-10-08 | End: 2019-10-09

## 2019-10-08 RX ORDER — KETOROLAC TROMETHAMINE 15 MG/ML
15 INJECTION, SOLUTION INTRAMUSCULAR; INTRAVENOUS ONCE
Status: COMPLETED | OUTPATIENT
Start: 2019-10-08 | End: 2019-10-09

## 2019-10-09 PROCEDURE — 96372 THER/PROPH/DIAG INJ SC/IM: CPT | Performed by: PHYSICAL MEDICINE & REHABILITATION

## 2019-10-09 RX ADMIN — KETOROLAC TROMETHAMINE 15 MG: 15 INJECTION, SOLUTION INTRAMUSCULAR; INTRAVENOUS at 08:50

## 2019-10-09 RX ADMIN — ORPHENADRINE CITRATE 60 MG: 30 INJECTION INTRAMUSCULAR; INTRAVENOUS at 08:51

## 2019-10-31 ENCOUNTER — TELEPHONE (OUTPATIENT)
Dept: NEUROLOGY | Age: 54
End: 2019-10-31

## 2019-10-31 ENCOUNTER — OFFICE VISIT (OUTPATIENT)
Dept: NEUROLOGY | Age: 54
End: 2019-10-31
Payer: COMMERCIAL

## 2019-10-31 VITALS
WEIGHT: 111 LBS | OXYGEN SATURATION: 98 % | HEIGHT: 62 IN | BODY MASS INDEX: 20.43 KG/M2 | SYSTOLIC BLOOD PRESSURE: 104 MMHG | HEART RATE: 86 BPM | DIASTOLIC BLOOD PRESSURE: 70 MMHG

## 2019-10-31 DIAGNOSIS — G56.22 ULNAR NEURITIS, LEFT: Primary | ICD-10-CM

## 2019-10-31 DIAGNOSIS — M48.02 CERVICAL STENOSIS OF SPINE: ICD-10-CM

## 2019-10-31 PROCEDURE — 99244 OFF/OP CNSLTJ NEW/EST MOD 40: CPT | Performed by: PSYCHIATRY & NEUROLOGY

## 2019-10-31 PROCEDURE — G8427 DOCREV CUR MEDS BY ELIG CLIN: HCPCS | Performed by: PSYCHIATRY & NEUROLOGY

## 2019-10-31 PROCEDURE — G8420 CALC BMI NORM PARAMETERS: HCPCS | Performed by: PSYCHIATRY & NEUROLOGY

## 2019-10-31 PROCEDURE — G8484 FLU IMMUNIZE NO ADMIN: HCPCS | Performed by: PSYCHIATRY & NEUROLOGY

## 2019-10-31 RX ORDER — GABAPENTIN 100 MG/1
CAPSULE ORAL 3 TIMES DAILY
Refills: 0 | COMMUNITY
Start: 2019-10-25 | End: 2020-01-29 | Stop reason: DRUGHIGH

## 2019-10-31 ASSESSMENT — ENCOUNTER SYMPTOMS
PHOTOPHOBIA: 0
VOMITING: 0
NAUSEA: 0
TROUBLE SWALLOWING: 0
SHORTNESS OF BREATH: 0

## 2019-11-04 ENCOUNTER — TELEPHONE (OUTPATIENT)
Dept: PHYSICAL MEDICINE AND REHAB | Age: 54
End: 2019-11-04

## 2019-11-06 ENCOUNTER — TELEPHONE (OUTPATIENT)
Dept: NEUROLOGY | Age: 54
End: 2019-11-06

## 2019-11-07 ENCOUNTER — TELEPHONE (OUTPATIENT)
Dept: PHYSICAL MEDICINE AND REHAB | Age: 54
End: 2019-11-07

## 2019-11-08 ENCOUNTER — HOSPITAL ENCOUNTER (OUTPATIENT)
Dept: MRI IMAGING | Age: 54
Discharge: HOME | End: 2019-11-10
Payer: COMMERCIAL

## 2019-11-08 DIAGNOSIS — M48.02 CERVICAL STENOSIS OF SPINE: ICD-10-CM

## 2019-11-08 PROCEDURE — 72141 MRI NECK SPINE W/O DYE: CPT

## 2019-11-12 ENCOUNTER — OFFICE VISIT (OUTPATIENT)
Dept: NEUROLOGY | Age: 54
End: 2019-11-12
Payer: COMMERCIAL

## 2019-11-12 VITALS
OXYGEN SATURATION: 91 % | DIASTOLIC BLOOD PRESSURE: 76 MMHG | RESPIRATION RATE: 18 BRPM | HEART RATE: 85 BPM | HEIGHT: 62 IN | SYSTOLIC BLOOD PRESSURE: 112 MMHG | BODY MASS INDEX: 19.88 KG/M2 | TEMPERATURE: 97 F | WEIGHT: 108 LBS

## 2019-11-12 DIAGNOSIS — M79.642 LEFT HAND PAIN: ICD-10-CM

## 2019-11-12 PROCEDURE — 95912 NRV CNDJ TEST 11-12 STUDIES: CPT | Performed by: PSYCHIATRY & NEUROLOGY

## 2019-11-12 PROCEDURE — 95886 MUSC TEST DONE W/N TEST COMP: CPT | Performed by: PSYCHIATRY & NEUROLOGY

## 2019-11-12 RX ORDER — MELOXICAM 15 MG/1
15 TABLET ORAL DAILY
COMMUNITY
End: 2020-01-20 | Stop reason: SDUPTHER

## 2019-11-13 ENCOUNTER — TELEPHONE (OUTPATIENT)
Dept: PHYSICAL MEDICINE AND REHAB | Age: 54
End: 2019-11-13

## 2019-11-14 ENCOUNTER — OFFICE VISIT (OUTPATIENT)
Dept: PHYSICAL MEDICINE AND REHAB | Age: 54
End: 2019-11-14
Payer: COMMERCIAL

## 2019-11-14 VITALS
HEIGHT: 62 IN | HEART RATE: 112 BPM | BODY MASS INDEX: 19.88 KG/M2 | TEMPERATURE: 98 F | SYSTOLIC BLOOD PRESSURE: 108 MMHG | WEIGHT: 108 LBS | DIASTOLIC BLOOD PRESSURE: 70 MMHG

## 2019-11-14 DIAGNOSIS — G43.709 CHRONIC MIGRAINE WITHOUT AURA WITHOUT STATUS MIGRAINOSUS, NOT INTRACTABLE: Primary | ICD-10-CM

## 2019-11-14 PROCEDURE — 64615 CHEMODENERV MUSC MIGRAINE: CPT | Performed by: PHYSICAL MEDICINE & REHABILITATION

## 2019-12-04 ENCOUNTER — OFFICE VISIT (OUTPATIENT)
Dept: PAIN MANAGEMENT | Age: 54
End: 2019-12-04
Payer: COMMERCIAL

## 2019-12-04 VITALS
OXYGEN SATURATION: 95 % | DIASTOLIC BLOOD PRESSURE: 60 MMHG | RESPIRATION RATE: 16 BRPM | HEIGHT: 62 IN | WEIGHT: 108 LBS | HEART RATE: 83 BPM | TEMPERATURE: 97.7 F | BODY MASS INDEX: 19.88 KG/M2 | SYSTOLIC BLOOD PRESSURE: 98 MMHG

## 2019-12-04 DIAGNOSIS — G89.4 CHRONIC PAIN SYNDROME: ICD-10-CM

## 2019-12-04 DIAGNOSIS — M79.2 NEURALGIA AND NEURITIS: ICD-10-CM

## 2019-12-04 DIAGNOSIS — M79.642 PAIN IN LEFT HAND: Primary | ICD-10-CM

## 2019-12-04 PROCEDURE — G8420 CALC BMI NORM PARAMETERS: HCPCS | Performed by: PAIN MEDICINE

## 2019-12-04 PROCEDURE — 4004F PT TOBACCO SCREEN RCVD TLK: CPT | Performed by: PAIN MEDICINE

## 2019-12-04 PROCEDURE — 99204 OFFICE O/P NEW MOD 45 MIN: CPT | Performed by: PAIN MEDICINE

## 2019-12-04 PROCEDURE — G8484 FLU IMMUNIZE NO ADMIN: HCPCS | Performed by: PAIN MEDICINE

## 2019-12-04 PROCEDURE — 3017F COLORECTAL CA SCREEN DOC REV: CPT | Performed by: PAIN MEDICINE

## 2019-12-04 PROCEDURE — G8427 DOCREV CUR MEDS BY ELIG CLIN: HCPCS | Performed by: PAIN MEDICINE

## 2019-12-04 RX ORDER — ALPRAZOLAM 0.5 MG/1
0.5 TABLET ORAL NIGHTLY PRN
COMMUNITY
End: 2020-01-20

## 2019-12-04 RX ORDER — TRAZODONE HYDROCHLORIDE 50 MG/1
50 TABLET ORAL NIGHTLY
COMMUNITY
End: 2021-09-29

## 2019-12-16 ENCOUNTER — OFFICE VISIT (OUTPATIENT)
Dept: NEUROLOGY | Age: 54
End: 2019-12-16
Payer: COMMERCIAL

## 2019-12-16 VITALS
OXYGEN SATURATION: 97 % | HEIGHT: 62 IN | WEIGHT: 111 LBS | BODY MASS INDEX: 20.43 KG/M2 | SYSTOLIC BLOOD PRESSURE: 104 MMHG | DIASTOLIC BLOOD PRESSURE: 72 MMHG | TEMPERATURE: 97.2 F | HEART RATE: 97 BPM | RESPIRATION RATE: 18 BRPM

## 2019-12-16 DIAGNOSIS — G56.22 ULNAR NEURITIS, LEFT: ICD-10-CM

## 2019-12-16 DIAGNOSIS — G56.22 GUYON SYNDROME, LEFT: Primary | ICD-10-CM

## 2019-12-16 PROCEDURE — 4004F PT TOBACCO SCREEN RCVD TLK: CPT | Performed by: PSYCHIATRY & NEUROLOGY

## 2019-12-16 PROCEDURE — 3017F COLORECTAL CA SCREEN DOC REV: CPT | Performed by: PSYCHIATRY & NEUROLOGY

## 2019-12-16 PROCEDURE — G8420 CALC BMI NORM PARAMETERS: HCPCS | Performed by: PSYCHIATRY & NEUROLOGY

## 2019-12-16 PROCEDURE — 99214 OFFICE O/P EST MOD 30 MIN: CPT | Performed by: PSYCHIATRY & NEUROLOGY

## 2019-12-16 PROCEDURE — G8427 DOCREV CUR MEDS BY ELIG CLIN: HCPCS | Performed by: PSYCHIATRY & NEUROLOGY

## 2019-12-16 PROCEDURE — G8484 FLU IMMUNIZE NO ADMIN: HCPCS | Performed by: PSYCHIATRY & NEUROLOGY

## 2019-12-16 ASSESSMENT — ENCOUNTER SYMPTOMS
NAUSEA: 0
VOMITING: 0
PHOTOPHOBIA: 0
TROUBLE SWALLOWING: 0
SHORTNESS OF BREATH: 0

## 2019-12-17 ENCOUNTER — OFFICE VISIT (OUTPATIENT)
Dept: PHYSICAL MEDICINE AND REHAB | Age: 54
End: 2019-12-17
Payer: COMMERCIAL

## 2019-12-17 VITALS — HEIGHT: 63 IN | WEIGHT: 111 LBS | BODY MASS INDEX: 19.67 KG/M2

## 2019-12-17 DIAGNOSIS — G43.709 CHRONIC MIGRAINE WITHOUT AURA WITHOUT STATUS MIGRAINOSUS, NOT INTRACTABLE: ICD-10-CM

## 2019-12-17 DIAGNOSIS — R20.2 PARESTHESIA: Primary | ICD-10-CM

## 2019-12-17 PROCEDURE — 95910 NRV CNDJ TEST 7-8 STUDIES: CPT | Performed by: PHYSICAL MEDICINE & REHABILITATION

## 2019-12-17 PROCEDURE — 95886 MUSC TEST DONE W/N TEST COMP: CPT | Performed by: PHYSICAL MEDICINE & REHABILITATION

## 2019-12-17 RX ORDER — BUTALBITAL, ACETAMINOPHEN AND CAFFEINE 300; 40; 50 MG/1; MG/1; MG/1
1 CAPSULE ORAL DAILY PRN
Qty: 14 CAPSULE | Refills: 2 | Status: SHIPPED | OUTPATIENT
Start: 2019-12-17 | End: 2020-01-29 | Stop reason: SDUPTHER

## 2020-01-06 ENCOUNTER — OFFICE VISIT (OUTPATIENT)
Dept: PAIN MANAGEMENT | Age: 55
End: 2020-01-06
Payer: COMMERCIAL

## 2020-01-06 VITALS
OXYGEN SATURATION: 96 % | SYSTOLIC BLOOD PRESSURE: 114 MMHG | TEMPERATURE: 97.6 F | BODY MASS INDEX: 19.14 KG/M2 | WEIGHT: 108 LBS | DIASTOLIC BLOOD PRESSURE: 64 MMHG | HEIGHT: 63 IN | RESPIRATION RATE: 16 BRPM | HEART RATE: 82 BPM

## 2020-01-06 PROCEDURE — 4004F PT TOBACCO SCREEN RCVD TLK: CPT | Performed by: PAIN MEDICINE

## 2020-01-06 PROCEDURE — 99213 OFFICE O/P EST LOW 20 MIN: CPT | Performed by: PAIN MEDICINE

## 2020-01-06 PROCEDURE — G8420 CALC BMI NORM PARAMETERS: HCPCS | Performed by: PAIN MEDICINE

## 2020-01-06 PROCEDURE — 3017F COLORECTAL CA SCREEN DOC REV: CPT | Performed by: PAIN MEDICINE

## 2020-01-06 PROCEDURE — G8427 DOCREV CUR MEDS BY ELIG CLIN: HCPCS | Performed by: PAIN MEDICINE

## 2020-01-06 PROCEDURE — 99214 OFFICE O/P EST MOD 30 MIN: CPT | Performed by: PAIN MEDICINE

## 2020-01-06 PROCEDURE — G8484 FLU IMMUNIZE NO ADMIN: HCPCS | Performed by: PAIN MEDICINE

## 2020-01-06 RX ORDER — ZOLMITRIPTAN 5 MG/1
TABLET, FILM COATED ORAL
COMMUNITY
Start: 2019-12-27 | End: 2020-01-29 | Stop reason: SDUPTHER

## 2020-01-06 NOTE — PROGRESS NOTES
1/18/19 12/16/19  Tiffanyluz Mason, DO     Allergies   Allergen Reactions    Avelox [Moxifloxacin]     Charcoal Activated [Activated Charcoal] Hives    Sulfa Antibiotics      Social History     Socioeconomic History    Marital status:      Spouse name: Not on file    Number of children: Not on file    Years of education: Not on file    Highest education level: Not on file   Occupational History    Not on file   Social Needs    Financial resource strain: Not on file    Food insecurity:     Worry: Not on file     Inability: Not on file    Transportation needs:     Medical: Not on file     Non-medical: Not on file   Tobacco Use    Smoking status: Current Some Day Smoker     Packs/day: 0.25     Types: Cigarettes    Smokeless tobacco: Never Used   Substance and Sexual Activity    Alcohol use: Yes     Comment: socially    Drug use: Never    Sexual activity: Not on file   Lifestyle    Physical activity:     Days per week: Not on file     Minutes per session: Not on file    Stress: Not on file   Relationships    Social connections:     Talks on phone: Not on file     Gets together: Not on file     Attends Hindu service: Not on file     Active member of club or organization: Not on file     Attends meetings of clubs or organizations: Not on file     Relationship status: Not on file    Intimate partner violence:     Fear of current or ex partner: Not on file     Emotionally abused: Not on file     Physically abused: Not on file     Forced sexual activity: Not on file   Other Topics Concern    Not on file   Social History Narrative    Not on file     Family History   Problem Relation Age of Onset    Osteoporosis Mother     Hypertension Father     Cancer Father      REVIEW OF SYSTEMS:     Tawana Helton denies fever/chills, chest pain, shortness of breath, new bowel or bladder complaints. All other review of systems was negative.     PHYSICAL EXAMINATION:      /64   Pulse 82   Temp 97.6 °F (36.4 °C) (Oral)   Resp 16   Ht 5' 2.5\" (1.588 m)   Wt 108 lb (49 kg)   LMP 04/10/2018 (Exact Date)   SpO2 96%   BMI 19.44 kg/m²     General:       General appearance:   pleasant and well-hydrated. , in moderate discomfort and A & O x3  Build:Normal Weight     HEENT:     Head:normocephalic and atraumatic  Pupils:regular, round and equal.  Sclera: icterus absent,      Lungs:     Breathing:Breathing Pattern: regular, no distress     Abdomen:     Shape:non-distended and normal  Tenderness:none     Cervical spine:     Inspection:normal  Palpation:tenderness paravertebral muscles, facet loading, left, right, negative and tenderness.   Range of motion:normal not flexion, extension rotation bilateral and is not painful.     Musculoskeletal:     Trigger points in Paraveteral:absent bilaterally  Veloz's:negative right, negative left      Extremities:     Tremors:None bilaterally upper and lower  Range of motion:Generally normal shoulders  Intact:Yes  Cyanosis:none  Edema:Normal  Tenderness over Left carpometacarpal joint of Left little finger and metacarpophalangeal joints of Left ring and little finger.     Neurological:     Sensory:normal to light touch bilateral , Tinel's negative Left Ulnar and Median   Motor:              Right Bicep5/5           Left Bicep5/5              Right Triceps5/5       Left Triceps5/5          Right Deltoid5/5     Left Deltoid5/5                  Right Quadriceps5/5          Left Quadriceps5/5           Right Gastrocnemius5/5    Left Gastrocnemius5/5  Right Ant Tibialis5/5  Left Ant Tibialis5/5  Reflexes:    Right Brachioradialis reflex2+  Left Brachioradialis reflex2+  Right Biceps reflex2+  Left Biceps reflex2+  Right Triceps reflex2+  Left Triceps reflex2+  Gait:normal     Dermatology:     Skin:no unusual rashes and no skin lesions     Impression:  Left wrist/ring and little finger   Patient had seen Neurology and had EMG of Left upper extremity which was normal  Cervical spine MRI showed C5-6 and C6-7 foraminal stenosis   ? ? Guyon's canal   Plan:  Follow up on her Left hand pain   Patient had seen Shukri Workman and she had an EMG with her which was normal   Left hand MRI was discussed with the patient   Patient is scheduled to see a hand surgeon this Friday. Will follow up on his recommendations. Will consider diagnostic cervical RADHA if she is not a surgical candidate   Patient also has an appointment with  (orthopedic surgeon)  Patient had also seen a rheumatologist and her workup was negative. Currently on Mobic 15 mg QD which is helping with her pain  OARRS report reviewed  Patient encouraged to stay active  Treatment plan discussed with the patient including medications side effects   Time spend with the patient > 25 minutes     We discussed with the patient that combining opioids, benzodiazepines, alcohol, illicit drugs or sleep aids increases the risk of respiratory depression including death. We discussed that these medications may cause drowsiness, sedation or dizziness and have counseled the patient not to drive or operate machinery. We have discussed that these medications will be prescribed only by one provider. We have discussed with the patient about age related risk factors and have thoroughly discussed the importance of taking these medications as prescribed. The patient verbalizes understanding. roc Jin M.D.

## 2020-01-20 ENCOUNTER — OFFICE VISIT (OUTPATIENT)
Dept: PAIN MANAGEMENT | Age: 55
End: 2020-01-20
Payer: COMMERCIAL

## 2020-01-20 VITALS
OXYGEN SATURATION: 98 % | RESPIRATION RATE: 16 BRPM | SYSTOLIC BLOOD PRESSURE: 112 MMHG | WEIGHT: 107 LBS | DIASTOLIC BLOOD PRESSURE: 80 MMHG | TEMPERATURE: 98 F | HEIGHT: 63 IN | HEART RATE: 94 BPM | BODY MASS INDEX: 18.96 KG/M2

## 2020-01-20 PROCEDURE — G8420 CALC BMI NORM PARAMETERS: HCPCS | Performed by: PAIN MEDICINE

## 2020-01-20 PROCEDURE — G8484 FLU IMMUNIZE NO ADMIN: HCPCS | Performed by: PAIN MEDICINE

## 2020-01-20 PROCEDURE — 4004F PT TOBACCO SCREEN RCVD TLK: CPT | Performed by: PAIN MEDICINE

## 2020-01-20 PROCEDURE — 99213 OFFICE O/P EST LOW 20 MIN: CPT | Performed by: PAIN MEDICINE

## 2020-01-20 PROCEDURE — 3017F COLORECTAL CA SCREEN DOC REV: CPT | Performed by: PAIN MEDICINE

## 2020-01-20 PROCEDURE — G8427 DOCREV CUR MEDS BY ELIG CLIN: HCPCS | Performed by: PAIN MEDICINE

## 2020-01-20 RX ORDER — GABAPENTIN 300 MG/1
300 CAPSULE ORAL 3 TIMES DAILY
Qty: 90 CAPSULE | Refills: 1 | Status: SHIPPED
Start: 2020-01-20 | End: 2020-03-26 | Stop reason: DRUGHIGH

## 2020-01-20 RX ORDER — MELOXICAM 15 MG/1
15 TABLET ORAL DAILY
Qty: 30 TABLET | Refills: 1 | Status: SHIPPED
Start: 2020-01-20 | End: 2020-03-11

## 2020-01-20 NOTE — PROGRESS NOTES
[address]  [address] [facility], [address] [place] [address] [place]  [phone]    Follow up Note      Dean Briseno     Date of Visit:  1/20/2020    CC:  Patient presents for follow up   Chief Complaint   Patient presents with    Pain     left hand     HPI:    Pain is unchanged. Change in quality of symptoms:no. Medication side effects:none. Recent diagnostic testing:None  Recent interventional procedures:none. .    Imaging:   Cervical spine MRI 11/2019  Posterior fossa is normal. Bone marrow signal is normal. Prevertebral   soft tissues are normal. No epidural fluid collection.       The spinal cord fails to demonstrate signal alteration.       At the C5-C6 level there is a large broad-based diffuse disc bulge   which flattens the anterior thecal sac and likely contacts the cord   but does not cause a cord contour or signal abnormality.       Bilateral neuroforaminal narrowing is present at this level not   grossly contacting exiting nerve roots.       C6-7 also demonstrates a diffuse disc bulge not contacting the cord. Bilateral neuroforaminal narrowing is present left side greater than   right which may contact the exiting nerve root depending on the   patient's clinical presentation, side and level of radicular symptoms.       The remaining levels are unremarkable.      EMG 11/2019 Normal    EMG 12/2019 Normal     Left hand MRI 12/2019  1. Interphalangeal joint degenerative changes in the hand an first [facility]   degenerative changes at the wrist.    2.  No focal soft tissue edema or fluid collection with attention to the   fourth and fifth digits/interspace.     Previous treatments: Physical Therapy and medications. .         Potential Aberrant Drug-Related Behavior:    No    Urine Drug Screening:  none    OARRS report[de-identified]  01/2020 consistent     Past Medical History:   Diagnosis Date    Endometriosis     Headache     Hypertension     Osteoarthritis      Past Surgical History: Regine's canal   Plan:  Follow up on her Left hand pain   Patient had seen hand surgeon and was told that she CRPS. Patient symptoms and signs are not consistent with CRPS  Will hold off on JOSE RAMON for now   Patient will cancel her appointment with  (orthopedic surgeon). She is apprehensive about surgery   Patient had also seen a rheumatologist and her workup was negative. Currently on Mobic 15 mg QD which is helping with her pain  Continue with Gabapentin but will change to 300 mg TID  OARRS report reviewed  Patient encouraged to stay active  Treatment plan discussed with the patient including medications side effects     We discussed with the patient that combining opioids, benzodiazepines, alcohol, illicit drugs or sleep aids increases the risk of respiratory depression including death. We discussed that these medications may cause drowsiness, sedation or dizziness and have counseled the patient not to drive or operate machinery. We have discussed that these medications will be prescribed only by one provider. We have discussed with the patient about age related risk factors and have thoroughly discussed the importance of taking these medications as prescribed. The patient verbalizes understanding. roc Griffin M.D.

## 2020-01-23 ENCOUNTER — TELEPHONE (OUTPATIENT)
Dept: PHYSICAL MEDICINE AND REHAB | Age: 55
End: 2020-01-23

## 2020-01-23 NOTE — TELEPHONE ENCOUNTER
I called [facility] to schedule Botox deliveray, and per Tito Lopez, it has been scheduled to be delivered on Tues 1/28/20.

## 2020-01-28 ENCOUNTER — NURSE ONLY (OUTPATIENT)
Dept: PHYSICAL MEDICINE AND REHAB | Age: 55
End: 2020-01-28
Payer: COMMERCIAL

## 2020-01-28 NOTE — PROGRESS NOTES
Patient was given Toradol 15 mg injection in right dorsogluteal area and Norflex 60 mg injection in left dorsogluteal area.

## 2020-01-29 ENCOUNTER — OFFICE VISIT (OUTPATIENT)
Dept: PHYSICAL MEDICINE AND REHAB | Age: 55
End: 2020-01-29
Payer: COMMERCIAL

## 2020-01-29 VITALS
DIASTOLIC BLOOD PRESSURE: 62 MMHG | BODY MASS INDEX: 20.06 KG/M2 | HEIGHT: 62 IN | WEIGHT: 109 LBS | SYSTOLIC BLOOD PRESSURE: 101 MMHG | TEMPERATURE: 97.7 F | HEART RATE: 76 BPM

## 2020-01-29 PROCEDURE — 64615 CHEMODENERV MUSC MIGRAINE: CPT | Performed by: PHYSICAL MEDICINE & REHABILITATION

## 2020-01-29 PROCEDURE — 96372 THER/PROPH/DIAG INJ SC/IM: CPT | Performed by: PHYSICAL MEDICINE & REHABILITATION

## 2020-01-29 RX ORDER — BUTALBITAL, ACETAMINOPHEN AND CAFFEINE 300; 40; 50 MG/1; MG/1; MG/1
1 CAPSULE ORAL DAILY PRN
Qty: 14 CAPSULE | Refills: 2 | Status: SHIPPED | OUTPATIENT
Start: 2020-01-29 | End: 2020-03-11 | Stop reason: SDUPTHER

## 2020-01-29 RX ORDER — ORPHENADRINE CITRATE 30 MG/ML
60 INJECTION INTRAMUSCULAR; INTRAVENOUS ONCE
Status: COMPLETED | OUTPATIENT
Start: 2020-01-29 | End: 2020-01-29

## 2020-01-29 RX ORDER — KETOROLAC TROMETHAMINE 15 MG/ML
15 INJECTION, SOLUTION INTRAMUSCULAR; INTRAVENOUS ONCE
Status: COMPLETED | OUTPATIENT
Start: 2020-01-29 | End: 2020-01-29

## 2020-01-29 RX ADMIN — ORPHENADRINE CITRATE 60 MG: 30 INJECTION INTRAMUSCULAR; INTRAVENOUS at 13:10

## 2020-01-29 RX ADMIN — KETOROLAC TROMETHAMINE 15 MG: 15 INJECTION, SOLUTION INTRAMUSCULAR; INTRAVENOUS at 13:10

## 2020-01-29 NOTE — PROGRESS NOTES
Emanuel Whitman D.O. [facility]  [address] [facility] [place]  Phone: [phone]  Fax: [phone]    1/29/2020    Chief Complaint   Patient presents with    Migraine     Botox injections       · Informed Consent:  The indications, risks, benefits, and  alternatives of onabotulinum toxin A were discussed with the patient. I explained to the patient the potential side effects including but not limited to: distant spread of toxin effect causing droopy eyelids, facial drooping, neck pain, headache, double vision, muscle pain/spasm/weakness/stiffness,  bronchitis,  injection site pain, increased blood pressure, hypersensitivity, anaphylaxis, difficulty swallowing, difficulty speaking, urinary incontinence and breathing difficulty. The patient is aware that swallowing and breathing difficulties can be life threatening and there have been reports of death in some cases where onabotulinum toxin was injected. The patient was advised of the expected benefit to include less headache days per month, and the anticipated duration of effect of 3 months. A permit was signed and scanned into the media. · Last injection: 8/22/19  · Taking anticoagulants/antiplatelets: No  · Diabetic: No  · Febrile/active infection: No    · Procedure note: After obtaining verbal and written consent from the patient for injection of  onabotulinum toxin A the patient agreed to proceed. 200 units of onabotulinum toxin A was reconstituted using 4 cc of preservative free normal saline ( 5 units per 0.1 ml). The medication was injected using a 30 g 0.5\" needle and a 1 cc syringe. The skin was prepared with Betadine.   Using a no touch technique, the injections were carried out at the following sites: bilateral  10 units divided at 2 sites, Procerus 5 units at one site, bilateral Frontalis 20 units at 4 sites, bilateral Temporalis 40 units divided at 8 sites, bilateral Occipitalis

## 2020-01-29 NOTE — PROGRESS NOTES
Administrations This Visit     ketorolac (TORADOL) injection 15 mg     Admin Date  01/29/2020  13:10 Action  Given Dose  15 mg Route  Intramuscular Site  Dorsogluteal Right Administered By  Radha Hurtado MA    Ordering Provider:  Humaira Gomez DO    NDC:  9123-3137-33    Lot#:  [id number]    :  Elbert Bell    Patient Supplied?:  No    Comments:  EXP: DEC 2020

## 2020-02-03 ENCOUNTER — TELEPHONE (OUTPATIENT)
Dept: PHYSICAL MEDICINE AND REHAB | Age: 55
End: 2020-02-03

## 2020-02-04 NOTE — TELEPHONE ENCOUNTER
She has had Flexeril in the past, does that work well for her? I can send it in if yes. If not I will send an alternative muscle relaxer for her.

## 2020-02-05 NOTE — TELEPHONE ENCOUNTER
Called and left message on patient voicemail to call the office back. Will wait for return call from patient.

## 2020-02-05 NOTE — TELEPHONE ENCOUNTER
Patient called back and she stated the she has been using Flexeril 10 mg  bid and doing her neck exercises and is also taking Mobic and is helping. Still real tight on her right side. Is there something else she can do to get it to loosen her neck on right side.

## 2020-02-05 NOTE — TELEPHONE ENCOUNTER
I would recommend a deep tissue massage. If not then she can come in for evaluation. I know she has had trigger points in the past which we have injected and I would need to take a look at her again to see if they are back.

## 2020-02-14 ENCOUNTER — OFFICE VISIT (OUTPATIENT)
Dept: PHYSICAL MEDICINE AND REHAB | Age: 55
End: 2020-02-14
Payer: COMMERCIAL

## 2020-02-14 VITALS
DIASTOLIC BLOOD PRESSURE: 84 MMHG | BODY MASS INDEX: 20.98 KG/M2 | HEIGHT: 62 IN | WEIGHT: 114 LBS | SYSTOLIC BLOOD PRESSURE: 118 MMHG | HEART RATE: 92 BPM

## 2020-02-14 PROCEDURE — G8427 DOCREV CUR MEDS BY ELIG CLIN: HCPCS | Performed by: PHYSICAL MEDICINE & REHABILITATION

## 2020-02-14 PROCEDURE — G8484 FLU IMMUNIZE NO ADMIN: HCPCS | Performed by: PHYSICAL MEDICINE & REHABILITATION

## 2020-02-14 PROCEDURE — 99214 OFFICE O/P EST MOD 30 MIN: CPT | Performed by: PHYSICAL MEDICINE & REHABILITATION

## 2020-02-14 PROCEDURE — G8420 CALC BMI NORM PARAMETERS: HCPCS | Performed by: PHYSICAL MEDICINE & REHABILITATION

## 2020-02-14 PROCEDURE — 3017F COLORECTAL CA SCREEN DOC REV: CPT | Performed by: PHYSICAL MEDICINE & REHABILITATION

## 2020-02-14 PROCEDURE — 4004F PT TOBACCO SCREEN RCVD TLK: CPT | Performed by: PHYSICAL MEDICINE & REHABILITATION

## 2020-02-14 RX ORDER — ZOLMITRIPTAN 5 MG/1
5 TABLET, FILM COATED ORAL PRN
Qty: 27 TABLET | Refills: 3 | Status: SHIPPED
Start: 2020-02-14 | End: 2020-08-31 | Stop reason: SDUPTHER

## 2020-02-14 RX ORDER — CEFDINIR 300 MG/1
300 CAPSULE ORAL EVERY 12 HOURS
COMMUNITY
Start: 2020-02-07 | End: 2020-06-09 | Stop reason: ALTCHOICE

## 2020-02-14 NOTE — PROGRESS NOTES
cefdinir (OMNICEF) 300 MG capsule Take 300 mg by mouth every 12 hours      ZOLMitriptan (ZOMIG) 5 MG tablet Take 1 tablet by mouth as needed for Migraine 27 tablet 3    butalbital-APAP-caffeine (FIORICET) -40 MG CAPS per capsule Take 1 capsule by mouth daily as needed for Headaches 14 capsule 2    NONFORMULARY Take 8 mg by mouth 2 times daily CBD oil      meloxicam (MOBIC) 15 MG tablet Take 1 tablet by mouth daily 30 tablet 1    gabapentin (NEURONTIN) 300 MG capsule Take 1 capsule by mouth 3 times daily for 30 days. Intended supply: 90 days 90 capsule 1    Onabotulinumtoxin A (BOTOX) 200 units injection Inject 155 Units into the muscle every 30 days      traZODone (DESYREL) 50 MG tablet Take 50 mg by mouth nightly Takes 1.5 tabs at HS      topiramate (TOPAMAX) 25 MG tablet Take one tablet in the morning and two tablets at hs. (Patient taking differently: Take two tablet in the morning and two tablets at hs.) 270 tablet 3    hydrOXYzine (ATARAX) 25 MG tablet Take 25 mg by mouth 3 times daily as needed for Itching      hydrochlorothiazide (MICROZIDE) 12.5 MG capsule Take 12.5 mg by mouth daily.  cetirizine (ZYRTEC) 10 MG tablet Take 10 mg by mouth daily.  metoclopramide (REGLAN) 10 MG tablet Take 1 tablet by mouth daily as needed (nausea) (Patient not taking: Reported on 10/31/2019) 30 tablet 2     No current facility-administered medications for this visit. Allergies   Allergen Reactions    Avelox [Moxifloxacin]     Charcoal Activated [Activated Charcoal] Hives    Sulfa Antibiotics        Review of Systems:  No new weakness, paresthesia, incontinence of bowel or bladder, saddle anesthesia, falls or gait dysfunction. Otherwise, per HPI. Physical Exam:   Blood pressure 118/84, pulse 92, height 5' 2\" (1.575 m), weight 114 lb (51.7 kg), last menstrual period 04/10/2018. GENERAL: The patient is in no apparent distress. Body habitus is non- obese.   HEENT: No rhinorrhea, sneezing, yawning, or lacrimation. No scleral icterus or conjunctival injection. SKIN: No piloerection. No tract marks. No rash. PSYCH: Mood and affect are appropriate. Hygiene is appropriate. CARDIOVASCULAR  Heart is regular rate and rhythm. There is no edema. RESPIRATORY: Respirations are regular and unlabored. There is no cyanosis. GASTROINTESTINAL: Soft abdomen, non-tender. MSK: There is no joint effusion, deformity, instability, swelling, erythema or warmth. AROM is full in the spine and extremities. Spinal curvatures are normal.      NEURO: Gait is normal. No focal sensorimotor deficit. Reflexes 2+ and symmetric in lower extremities. Impression:   1. Chronic migraine without aura without status migrainosus, not intractable    2. Spondylosis of cervical region without myelopathy or radiculopathy    3. Myofascial pain syndrome, cervical        Plan:      Orders Placed This Encounter   Medications    ZOLMitriptan (ZOMIG) 5 MG tablet     Sig: Take 1 tablet by mouth as needed for Migraine     Dispense:  27 tablet     Refill:  3       Medications Discontinued During This Encounter   Medication Reason    ZOLMitriptan (ZOMIG) 5 MG tablet REORDER     Continue Botox. The patient was educated about the diagnosis, prognosis, indications, risks and benefits of treatment. An opportunity to ask questions was given to the patient and questions were answered. The patient agreed to proceed with the recommended treatment as described above. Return in about 6 weeks (around 3/27/2020). Thank you for allowing me to participate in the care of your patient. Franci Munoz D.O., P.T.   Board Certified Physical Medicine and Rehabilitation  Board Certified Electrodiagnostic Medicine

## 2020-03-11 ENCOUNTER — OFFICE VISIT (OUTPATIENT)
Dept: PHYSICAL MEDICINE AND REHAB | Age: 55
End: 2020-03-11
Payer: COMMERCIAL

## 2020-03-11 VITALS
HEIGHT: 62 IN | DIASTOLIC BLOOD PRESSURE: 72 MMHG | WEIGHT: 110 LBS | SYSTOLIC BLOOD PRESSURE: 114 MMHG | BODY MASS INDEX: 20.24 KG/M2 | HEART RATE: 93 BPM

## 2020-03-11 PROCEDURE — 20552 NJX 1/MLT TRIGGER POINT 1/2: CPT | Performed by: PHYSICAL MEDICINE & REHABILITATION

## 2020-03-11 PROCEDURE — G8427 DOCREV CUR MEDS BY ELIG CLIN: HCPCS | Performed by: PHYSICAL MEDICINE & REHABILITATION

## 2020-03-11 PROCEDURE — 99214 OFFICE O/P EST MOD 30 MIN: CPT | Performed by: PHYSICAL MEDICINE & REHABILITATION

## 2020-03-11 PROCEDURE — G8484 FLU IMMUNIZE NO ADMIN: HCPCS | Performed by: PHYSICAL MEDICINE & REHABILITATION

## 2020-03-11 PROCEDURE — 4004F PT TOBACCO SCREEN RCVD TLK: CPT | Performed by: PHYSICAL MEDICINE & REHABILITATION

## 2020-03-11 PROCEDURE — G8420 CALC BMI NORM PARAMETERS: HCPCS | Performed by: PHYSICAL MEDICINE & REHABILITATION

## 2020-03-11 PROCEDURE — 3017F COLORECTAL CA SCREEN DOC REV: CPT | Performed by: PHYSICAL MEDICINE & REHABILITATION

## 2020-03-11 RX ORDER — MELOXICAM 7.5 MG/1
7.5 TABLET ORAL DAILY
COMMUNITY
End: 2021-01-05 | Stop reason: ALTCHOICE

## 2020-03-11 RX ORDER — UBROGEPANT 50 MG/1
50 TABLET ORAL DAILY PRN
Qty: 10 TABLET | Refills: 2 | Status: SHIPPED | OUTPATIENT
Start: 2020-03-11 | End: 2020-04-10

## 2020-03-11 RX ORDER — LIDOCAINE HYDROCHLORIDE 10 MG/ML
4 INJECTION, SOLUTION INFILTRATION; PERINEURAL ONCE
Status: COMPLETED | OUTPATIENT
Start: 2020-03-11 | End: 2020-03-11

## 2020-03-11 RX ORDER — BUTALBITAL, ACETAMINOPHEN AND CAFFEINE 300; 40; 50 MG/1; MG/1; MG/1
1 CAPSULE ORAL DAILY PRN
Qty: 14 CAPSULE | Refills: 2 | Status: SHIPPED
Start: 2020-03-11 | End: 2020-04-21 | Stop reason: SDUPTHER

## 2020-03-11 RX ADMIN — LIDOCAINE HYDROCHLORIDE 4 ML: 10 INJECTION, SOLUTION INFILTRATION; PERINEURAL at 16:28

## 2020-03-11 NOTE — PROGRESS NOTES
Medication Sig Dispense Refill    meloxicam (MOBIC) 7.5 MG tablet Take 7.5 mg by mouth daily      butalbital-APAP-caffeine (FIORICET) -40 MG CAPS per capsule Take 1 capsule by mouth daily as needed for Headaches 14 capsule 2    Ubrogepant (UBRELVY) 50 MG TABS Take 50 mg by mouth daily as needed (headache) 10 tablet 2    cefdinir (OMNICEF) 300 MG capsule Take 300 mg by mouth every 12 hours      ZOLMitriptan (ZOMIG) 5 MG tablet Take 1 tablet by mouth as needed for Migraine 27 tablet 3    NONFORMULARY Take 8 mg by mouth 2 times daily CBD oil      gabapentin (NEURONTIN) 300 MG capsule Take 1 capsule by mouth 3 times daily for 30 days. Intended supply: 90 days 90 capsule 1    Onabotulinumtoxin A (BOTOX) 200 units injection Inject 155 Units into the muscle every 30 days      traZODone (DESYREL) 50 MG tablet Take 50 mg by mouth nightly Takes 1.5 tabs at HS      metoclopramide (REGLAN) 10 MG tablet Take 1 tablet by mouth daily as needed (nausea) 30 tablet 2    topiramate (TOPAMAX) 25 MG tablet Take one tablet in the morning and two tablets at hs. (Patient taking differently: Take 50 mg by mouth 2 times daily Take two tablet in the morning and two tablets at hs.) 270 tablet 3    hydrOXYzine (ATARAX) 25 MG tablet Take 25 mg by mouth 3 times daily as needed for Itching      hydrochlorothiazide (MICROZIDE) 12.5 MG capsule Take 12.5 mg by mouth daily.  cetirizine (ZYRTEC) 10 MG tablet Take 10 mg by mouth daily. No current facility-administered medications for this visit. Allergies   Allergen Reactions    Avelox [Moxifloxacin]     Charcoal Activated [Activated Charcoal] Hives    Sulfa Antibiotics        Review of Systems:  No new weakness, paresthesia, incontinence of bowel or bladder, saddle anesthesia, falls or gait dysfunction. Otherwise, per HPI.      Physical Exam:   Blood pressure 114/72, pulse 93, height 5' 2\" (1.575 m), weight 110 lb (49.9 kg), last menstrual period 04/10/2018. GENERAL: The patient is in no apparent distress. Body habitus is non- obese. HEENT: No rhinorrhea, sneezing, yawning, or lacrimation. No scleral icterus or conjunctival injection. SKIN: No piloerection. No tract marks. No rash. PSYCH: Mood and affect are appropriate. Hygiene is appropriate. CARDIOVASCULAR  Heart is regular rate and rhythm. There is no edema. RESPIRATORY: Respirations are regular and unlabored. There is no cyanosis. GASTROINTESTINAL: Soft abdomen, non-tender. MSK: There is no joint effusion, deformity, instability, swelling, erythema or warmth. AROM is full in the spine and extremities. Spinal curvatures are normal.    Trigger points present bilateral trapezius. NEURO: Gait is normal. No focal sensorimotor deficit. Reflexes 2+ and symmetric in lower extremities. Impression:   1. Trigger point    2. Chronic migraine without aura without status migrainosus, not intractable    3. Spondylosis of cervical region without myelopathy or radiculopathy    4. Myofascial pain syndrome, cervical        Plan:      Orders Placed This Encounter   Medications    butalbital-APAP-caffeine (FIORICET) -40 MG CAPS per capsule     Sig: Take 1 capsule by mouth daily as needed for Headaches     Dispense:  14 capsule     Refill:  2    Ubrogepant (UBRELVY) 50 MG TABS     Sig: Take 50 mg by mouth daily as needed (headache)     Dispense:  10 tablet     Refill:  2    lidocaine 1 % injection 4 mL       Medications Discontinued During This Encounter   Medication Reason    meloxicam (MOBIC) 15 MG tablet LIST CLEANUP    butalbital-APAP-caffeine (FIORICET) -40 MG CAPS per capsule REORDER     Continue Botox. The patient was educated about the diagnosis, prognosis, indications, risks and benefits of treatment. An opportunity to ask questions was given to the patient and questions were answered. The patient agreed to proceed with the recommended treatment as described above. Return in about 6 weeks (around 4/22/2020) for Botox injection. Thank you for allowing me to participate in the care of your patient. Aditi Martin D.O., P.T. Board Certified Physical Medicine and Rehabilitation  Board Certified [facility], [address] [place] [address]. [facility]  [address] [facility] [place]  Phone: [phone]  Fax: [phone]    3/12/2020    Chief Complaint   Patient presents with    Migraine     6 week follow up after Botox       Last injection: n/a  Taking anticoagulants/antiplatelets: No  Diabetic: No  Febrile/active infection: No    After explaining the indications, risks, benefits and alternatives of a Bilateral trapezius trigger point, the patient agreed to proceed. A permit was signed and scanned into the chart. The patient was placed in the  seated position. The skin over the trigger point was prepared with alcohol. Using aseptic no touch technique, a 22 gauge, 1 1/2\" needle with 4 cc of Xylocaine 1% was directed into the trigger point. After negative aspiration, 2 cc of the medication was injected in a fanning out method at each of two trigger points. Adequate hemostasis was achieved and a bandage applied. The patient tolerated the procedure well and was educated in post injection care. The patient was clinically monitored after the injection and left the office without incident. There was post-injection reduction in pain and improved range of motion. Aditi Martin D.O., P.T.   Board Certified Physical Medicine and Rehabilitation  Board Certified Electrodiagnostic Medicine    Administrations This Visit     lidocaine 1 % injection 4 mL     Admin Date  03/11/2020  16:28 Action  Given Dose  4 mL Route  Other Site   Administered By  Dangelo Cui MA    Ordering Provider:  Jaiden Hays DO    NDC:  8387-2181-43    Lot#:  [id number]    :  Astrid Gann    Patient Supplied?:  No

## 2020-03-25 ENCOUNTER — TELEMEDICINE (OUTPATIENT)
Dept: PAIN MANAGEMENT | Age: 55
End: 2020-03-25
Payer: COMMERCIAL

## 2020-03-25 PROCEDURE — 3017F COLORECTAL CA SCREEN DOC REV: CPT | Performed by: PAIN MEDICINE

## 2020-03-25 PROCEDURE — 99213 OFFICE O/P EST LOW 20 MIN: CPT | Performed by: PAIN MEDICINE

## 2020-03-25 PROCEDURE — G8427 DOCREV CUR MEDS BY ELIG CLIN: HCPCS | Performed by: PAIN MEDICINE

## 2020-03-25 NOTE — PROGRESS NOTES
[address] [facility], [address] [place] [address], Ruben Austin  [phone]    Tele health follow up Note      Leopoldo Julian     Date of Visit:  3/25/2020    CC:  Tele health follow up left hand pain    Consent:  He and/or health care decision maker is aware that that he may receive a bill for this tele-health service [facility], depending on his insurance coverage, and has provided verbal consent to proceed: Yes  I have considered the risks of abuse, dependence, addiction and diversion. My patient is aware that they will need a follow-up visit (in-person or virtually) at the appropriate time indicated for continued medications. Further, my patient is aware that when this acute crisis has lifted, they will be expected to return for an in-person visit and all elements of standard local and hospital guidelines in order to continue this medication. Patient location:Home   Physician location:Home    HPI:    Pain is unchanged. Appropriate analgesia with current medications regimen: yes - . Change in quality of symptoms:no. Medication side effects:none. Recent diagnostic testing:none. Recent interventional procedures:none. Imaging:   Cervical spine MRI 11/2019  Posterior fossa is normal. Bone marrow signal is normal. Prevertebral   soft tissues are normal. No epidural fluid collection.       The spinal cord fails to demonstrate signal alteration.       At the C5-C6 level there is a large broad-based diffuse disc bulge   which flattens the anterior thecal sac and likely contacts the cord   but does not cause a cord contour or signal abnormality.       Bilateral neuroforaminal narrowing is present at this level not   grossly contacting exiting nerve roots.       C6-7 also demonstrates a diffuse disc bulge not contacting the cord.    Bilateral neuroforaminal narrowing is present left side greater than   right which may contact the exiting nerve root depending on the   patient's clinical presentation, side and level of radicular symptoms.       The remaining levels are unremarkable.      EMG 11/2019 Normal     EMG 12/2019 Normal     Left hand MRI 12/2019  1. Interphalangeal joint degenerative changes in the hand an first [facility]   degenerative changes at the wrist.    2.  No focal soft tissue edema or fluid collection with attention to the   fourth and fifth digits/interspace.     Previous treatments: Physical Therapy and medications. Robbie Mohs                                        Potential Aberrant Drug-Related Behavior:    No     Urine Drug Screening:  none     OARRS report[de-identified]  03/2020 consistent      Past Medical History: Reviewed    Past Surgical History: Reviewed     Home Medications: Reviewed    Allergies: Reviewed     Social History: Reviewed     REVIEW OF SYSTEMS:     Shazia Damon denies fever/chills, chest pain, shortness of breath, new bowel or bladder complaints. All other review of systems was negative. PHYSICAL EXAMINATION:      General:       A & O x3  Build:Normal Weight    HEENT:    Head:normocephalic and atraumatic  Pupils:regular, round and equal.  Sclera: icterus absent,     Lungs:    Breathing:Breathing Pattern: regular, no distress    Extremities:    Tremors:None bilaterally upper and lower  Intact:Yes    Neurological:     Motor:          No apparent weakness per patient         Dermatology:    Skin:no unusual rashes and no skin lesions    Impression:    Left wrist/ring and little finger   Patient had seen Neurology and had EMG of Left upper extremity which was normal  Cervical spine MRI showed C5-6 and C6-7 foraminal stenosis   Patient had also seen a rheumatologist and her workup was negative. Patient had seen hand surgeon and was told that she CRPS. Patient symptoms and signs are not consistent with CRPS    Plan:  Telehealth follow up on her Left hand pain.  No acute issues   Will consider JOSE RAMON C6-7 Left paramedian if her symptoms worsens as a diagnostic procedure  Continue with

## 2020-03-26 ENCOUNTER — TELEPHONE (OUTPATIENT)
Dept: PAIN MANAGEMENT | Age: 55
End: 2020-03-26

## 2020-03-26 RX ORDER — GABAPENTIN 400 MG/1
400 CAPSULE ORAL 3 TIMES DAILY
Qty: 90 CAPSULE | Refills: 0
Start: 2020-03-26 | End: 2020-03-26 | Stop reason: SDUPTHER

## 2020-03-26 RX ORDER — GABAPENTIN 400 MG/1
400 CAPSULE ORAL 3 TIMES DAILY
Qty: 90 CAPSULE | Refills: 0 | Status: SHIPPED
Start: 2020-03-26 | End: 2020-04-21 | Stop reason: SDUPTHER

## 2020-03-26 RX ORDER — GABAPENTIN 400 MG/1
400 CAPSULE ORAL 3 TIMES DAILY
Qty: 90 CAPSULE | Refills: 0 | Status: SHIPPED
Start: 2020-03-26 | End: 2020-03-26 | Stop reason: SDUPTHER

## 2020-03-26 NOTE — TELEPHONE ENCOUNTER
When I escribed medications I wasn't given the option of sending it to [facility]  Can someone please called it in  thanks

## 2020-04-03 ENCOUNTER — TELEPHONE (OUTPATIENT)
Dept: ADMINISTRATIVE | Age: 55
End: 2020-04-03

## 2020-04-07 ENCOUNTER — TELEPHONE (OUTPATIENT)
Dept: PHYSICAL MEDICINE AND REHAB | Age: 55
End: 2020-04-07

## 2020-04-21 ENCOUNTER — OFFICE VISIT (OUTPATIENT)
Dept: PHYSICAL MEDICINE AND REHAB | Age: 55
End: 2020-04-21
Payer: COMMERCIAL

## 2020-04-21 VITALS
HEIGHT: 62 IN | WEIGHT: 116 LBS | BODY MASS INDEX: 21.35 KG/M2 | HEART RATE: 82 BPM | SYSTOLIC BLOOD PRESSURE: 110 MMHG | DIASTOLIC BLOOD PRESSURE: 78 MMHG | TEMPERATURE: 98.4 F

## 2020-04-21 PROCEDURE — 96372 THER/PROPH/DIAG INJ SC/IM: CPT | Performed by: PHYSICAL MEDICINE & REHABILITATION

## 2020-04-21 PROCEDURE — 64615 CHEMODENERV MUSC MIGRAINE: CPT | Performed by: PHYSICAL MEDICINE & REHABILITATION

## 2020-04-21 RX ORDER — ZOLMITRIPTAN 5 MG/1
5 TABLET, FILM COATED ORAL PRN
Qty: 12 TABLET | Refills: 8 | OUTPATIENT
Start: 2020-04-21 | End: 2020-07-20

## 2020-04-21 RX ORDER — BUTALBITAL, ACETAMINOPHEN AND CAFFEINE 300; 40; 50 MG/1; MG/1; MG/1
1 CAPSULE ORAL DAILY PRN
Qty: 14 CAPSULE | Refills: 2 | Status: SHIPPED
Start: 2020-04-21 | End: 2020-06-02 | Stop reason: SDUPTHER

## 2020-04-21 RX ORDER — BUTALBITAL, ACETAMINOPHEN AND CAFFEINE 300; 40; 50 MG/1; MG/1; MG/1
1 CAPSULE ORAL DAILY PRN
Qty: 14 CAPSULE | Refills: 2 | Status: SHIPPED
Start: 2020-04-21 | End: 2020-04-21 | Stop reason: CLARIF

## 2020-04-21 RX ORDER — KETOROLAC TROMETHAMINE 15 MG/ML
15 INJECTION, SOLUTION INTRAMUSCULAR; INTRAVENOUS ONCE
Status: COMPLETED | OUTPATIENT
Start: 2020-04-21 | End: 2020-04-21

## 2020-04-21 RX ORDER — GABAPENTIN 400 MG/1
400 CAPSULE ORAL 3 TIMES DAILY
Qty: 270 CAPSULE | Refills: 0 | Status: SHIPPED
Start: 2020-04-21 | End: 2021-02-16

## 2020-04-21 RX ADMIN — KETOROLAC TROMETHAMINE 15 MG: 15 INJECTION, SOLUTION INTRAMUSCULAR; INTRAVENOUS at 11:42

## 2020-05-12 ENCOUNTER — VIRTUAL VISIT (OUTPATIENT)
Dept: PAIN MANAGEMENT | Age: 55
End: 2020-05-12
Payer: COMMERCIAL

## 2020-05-12 ENCOUNTER — TELEPHONE (OUTPATIENT)
Dept: PHYSICAL MEDICINE AND REHAB | Age: 55
End: 2020-05-12

## 2020-05-12 PROCEDURE — 99213 OFFICE O/P EST LOW 20 MIN: CPT | Performed by: PAIN MEDICINE

## 2020-05-12 PROCEDURE — 3017F COLORECTAL CA SCREEN DOC REV: CPT | Performed by: PAIN MEDICINE

## 2020-05-12 PROCEDURE — G8427 DOCREV CUR MEDS BY ELIG CLIN: HCPCS | Performed by: PAIN MEDICINE

## 2020-05-12 RX ORDER — TOPIRAMATE 50 MG/1
50 TABLET, FILM COATED ORAL 2 TIMES DAILY
Qty: 60 TABLET | Refills: 2 | Status: SHIPPED
Start: 2020-05-12 | End: 2020-08-31 | Stop reason: SDUPTHER

## 2020-05-12 NOTE — PROGRESS NOTES
[address] [facility], [address] [place] [address] [place]  [phone]    Tele health follow up Note      Haider Norris     Date of Visit:  5/12/2020    CC:  Tele health follow up left hand pain    Consent:  He and/or health care decision maker is aware that that he may receive a bill for this tele-health service [facility], depending on his insurance coverage, and has provided verbal consent to proceed: Yes  I have considered the risks of abuse, dependence, addiction and diversion. My patient is aware that they will need a follow-up visit (in-person or virtually) at the appropriate time indicated for continued medications. Further, my patient is aware that when this acute crisis has lifted, they will be expected to return for an in-person visit and all elements of standard local and hospital guidelines in order to continue this medication. Patient location:Home   Physician location:Home    HPI:    Pain is unchanged. Appropriate analgesia with current medications regimen: yes - . Change in quality of symptoms:no. Medication side effects:none. Recent diagnostic testing:none. Recent interventional procedures:none. Imaging:   Cervical spine MRI 11/2019  Posterior fossa is normal. Bone marrow signal is normal. Prevertebral   soft tissues are normal. No epidural fluid collection.       The spinal cord fails to demonstrate signal alteration.       At the C5-C6 level there is a large broad-based diffuse disc bulge   which flattens the anterior thecal sac and likely contacts the cord   but does not cause a cord contour or signal abnormality.       Bilateral neuroforaminal narrowing is present at this level not   grossly contacting exiting nerve roots.       C6-7 also demonstrates a diffuse disc bulge not contacting the cord.    Bilateral neuroforaminal narrowing is present left side greater than   right which may contact the exiting nerve root depending on the   patient's

## 2020-05-12 NOTE — PROGRESS NOTES
Kit Schrader was read the following message We want to confirm that, for purposes of billing, this is a virtual visit with your provider for which we will submit a claim for reimbursement with your insurance company. You will be responsible for any copays, coinsurance amounts or other amounts not covered by your insurance company. If you do not accept this, unfortunately we will not be able to schedule a virtual visit with the provider. Do you accept? Josephine No responded Yes .

## 2020-06-02 ENCOUNTER — OFFICE VISIT (OUTPATIENT)
Dept: PHYSICAL MEDICINE AND REHAB | Age: 55
End: 2020-06-02
Payer: COMMERCIAL

## 2020-06-02 VITALS
SYSTOLIC BLOOD PRESSURE: 101 MMHG | DIASTOLIC BLOOD PRESSURE: 65 MMHG | WEIGHT: 112 LBS | HEART RATE: 82 BPM | BODY MASS INDEX: 20.61 KG/M2 | HEIGHT: 62 IN

## 2020-06-02 PROCEDURE — 3017F COLORECTAL CA SCREEN DOC REV: CPT | Performed by: PHYSICAL MEDICINE & REHABILITATION

## 2020-06-02 PROCEDURE — G8427 DOCREV CUR MEDS BY ELIG CLIN: HCPCS | Performed by: PHYSICAL MEDICINE & REHABILITATION

## 2020-06-02 PROCEDURE — 4004F PT TOBACCO SCREEN RCVD TLK: CPT | Performed by: PHYSICAL MEDICINE & REHABILITATION

## 2020-06-02 PROCEDURE — 99214 OFFICE O/P EST MOD 30 MIN: CPT | Performed by: PHYSICAL MEDICINE & REHABILITATION

## 2020-06-02 PROCEDURE — G8420 CALC BMI NORM PARAMETERS: HCPCS | Performed by: PHYSICAL MEDICINE & REHABILITATION

## 2020-06-02 PROCEDURE — 20552 NJX 1/MLT TRIGGER POINT 1/2: CPT | Performed by: PHYSICAL MEDICINE & REHABILITATION

## 2020-06-02 RX ORDER — CYCLOBENZAPRINE HCL 10 MG
10 TABLET ORAL 2 TIMES DAILY PRN
Qty: 180 TABLET | Refills: 3 | Status: SHIPPED
Start: 2020-06-02 | End: 2021-01-05 | Stop reason: SDUPTHER

## 2020-06-02 RX ORDER — BUTALBITAL, ACETAMINOPHEN AND CAFFEINE 300; 40; 50 MG/1; MG/1; MG/1
1 CAPSULE ORAL DAILY PRN
Qty: 14 CAPSULE | Refills: 2 | Status: SHIPPED
Start: 2020-06-02 | End: 2020-07-14 | Stop reason: SDUPTHER

## 2020-06-02 RX ORDER — LIDOCAINE HYDROCHLORIDE 10 MG/ML
4 INJECTION, SOLUTION INFILTRATION; PERINEURAL ONCE
Status: COMPLETED | OUTPATIENT
Start: 2020-06-02 | End: 2020-06-02

## 2020-06-02 RX ADMIN — LIDOCAINE HYDROCHLORIDE 4 ML: 10 INJECTION, SOLUTION INFILTRATION; PERINEURAL at 13:21

## 2020-06-02 NOTE — PROGRESS NOTES
Dispense Refill    butalbital-APAP-caffeine (FIORICET) -40 MG CAPS per capsule Take 1 capsule by mouth daily as needed for Headaches 14 capsule 2    cyclobenzaprine (FLEXERIL) 10 MG tablet Take 1 tablet by mouth 2 times daily as needed for Muscle spasms (may cause drowsiness) 180 tablet 3    topiramate (TOPAMAX) 50 MG tablet Take 1 tablet by mouth 2 times daily Take two tablet in the morning and two tablets at hs. 60 tablet 2    NEURONTIN 400 MG capsule Take 1 capsule by mouth 3 times daily for 90 days. 270 capsule 0    meloxicam (MOBIC) 7.5 MG tablet Take 7.5 mg by mouth daily      cefdinir (OMNICEF) 300 MG capsule Take 300 mg by mouth every 12 hours      NONFORMULARY Take 8 mg by mouth 2 times daily CBD oil      Onabotulinumtoxin A (BOTOX) 200 units injection Inject 155 Units into the muscle every 30 days      traZODone (DESYREL) 50 MG tablet Take 50 mg by mouth nightly Takes 1.5 tabs at HS      hydrOXYzine (ATARAX) 25 MG tablet Take 25 mg by mouth 3 times daily as needed for Itching      hydrochlorothiazide (MICROZIDE) 12.5 MG capsule Take 12.5 mg by mouth daily.  cetirizine (ZYRTEC) 10 MG tablet Take 10 mg by mouth daily.  ZOLMitriptan (ZOMIG) 5 MG tablet Take 1 tablet by mouth as needed for Migraine 27 tablet 3    metoclopramide (REGLAN) 10 MG tablet Take 1 tablet by mouth daily as needed (nausea) 30 tablet 2     No current facility-administered medications for this visit. Allergies   Allergen Reactions    Avelox [Moxifloxacin]     Charcoal Activated [Activated Charcoal] Hives    Sulfa Antibiotics        Review of Systems:  No new weakness, paresthesia, incontinence of bowel or bladder, saddle anesthesia, falls or gait dysfunction. Otherwise, per HPI. Physical Exam:   Blood pressure 101/65, pulse 82, height 5' 2\" (1.575 m), weight 112 lb (50.8 kg), last menstrual period 04/10/2018. GENERAL: The patient is in no apparent distress. Body habitus is non-obese.   HEENT: No

## 2020-06-08 ENCOUNTER — TELEPHONE (OUTPATIENT)
Dept: PHYSICAL MEDICINE AND REHAB | Age: 55
End: 2020-06-08

## 2020-06-09 ENCOUNTER — OFFICE VISIT (OUTPATIENT)
Dept: PAIN MANAGEMENT | Age: 55
End: 2020-06-09
Payer: COMMERCIAL

## 2020-06-09 VITALS
HEART RATE: 90 BPM | SYSTOLIC BLOOD PRESSURE: 104 MMHG | DIASTOLIC BLOOD PRESSURE: 66 MMHG | TEMPERATURE: 98.3 F | RESPIRATION RATE: 16 BRPM

## 2020-06-09 PROBLEM — M79.18 MYOFASCIAL PAIN DYSFUNCTION SYNDROME: Status: ACTIVE | Noted: 2020-06-09

## 2020-06-09 PROCEDURE — 99213 OFFICE O/P EST LOW 20 MIN: CPT | Performed by: PAIN MEDICINE

## 2020-06-09 PROCEDURE — 3017F COLORECTAL CA SCREEN DOC REV: CPT | Performed by: PAIN MEDICINE

## 2020-06-09 PROCEDURE — 99213 OFFICE O/P EST LOW 20 MIN: CPT

## 2020-06-09 PROCEDURE — G8420 CALC BMI NORM PARAMETERS: HCPCS | Performed by: PAIN MEDICINE

## 2020-06-09 PROCEDURE — 4004F PT TOBACCO SCREEN RCVD TLK: CPT | Performed by: PAIN MEDICINE

## 2020-06-09 PROCEDURE — G8427 DOCREV CUR MEDS BY ELIG CLIN: HCPCS | Performed by: PAIN MEDICINE

## 2020-06-09 RX ORDER — YEAST,DRIED (S. CEREVISIAE)
2 POWDER (GRAM) ORAL DAILY
COMMUNITY
End: 2021-09-17 | Stop reason: ALTCHOICE

## 2020-06-09 RX ORDER — METHYLPREDNISOLONE 4 MG/1
TABLET ORAL
COMMUNITY
Start: 2020-06-08 | End: 2020-07-30 | Stop reason: ALTCHOICE

## 2020-06-09 NOTE — PROGRESS NOTES
[address] [facility], [address] [place] [address] [place]  [phone]    Follow up Note      Claudean Banter     Date of Visit:  6/9/2020    CC:  Patient presents for follow up   Chief Complaint   Patient presents with    Follow-up    Arm Pain     left    Neck Pain     HPI:    Pain is unchanged. Change in quality of symptoms:no. Medication side effects:none. Recent diagnostic testing:None  Recent interventional procedures:none. .    Imaging:   Cervical spine MRI 11/2019  Posterior fossa is normal. Bone marrow signal is normal. Prevertebral   soft tissues are normal. No epidural fluid collection.       The spinal cord fails to demonstrate signal alteration.       At the C5-C6 level there is a large broad-based diffuse disc bulge   which flattens the anterior thecal sac and likely contacts the cord   but does not cause a cord contour or signal abnormality.       Bilateral neuroforaminal narrowing is present at this level not   grossly contacting exiting nerve roots.       C6-7 also demonstrates a diffuse disc bulge not contacting the cord. Bilateral neuroforaminal narrowing is present left side greater than   right which may contact the exiting nerve root depending on the   patient's clinical presentation, side and level of radicular symptoms.       The remaining levels are unremarkable.      EMG 11/2019 Normal    EMG 12/2019 Normal     Left hand MRI 12/2019  1. Interphalangeal joint degenerative changes in the hand an first [facility]   degenerative changes at the wrist.    2.  No focal soft tissue edema or fluid collection with attention to the   fourth and fifth digits/interspace.     Previous treatments: Physical Therapy and medications. .         Potential Aberrant Drug-Related Behavior:    No    Urine Drug Screening:  none    OARRS report[de-identified]  06/2020 consistent     Past Medical History:   Diagnosis Date    Endometriosis     Headache     Hypertension     Osteoarthritis Past Surgical History:   Procedure Laterality Date    ABDOMEN SURGERY      X2 Laproscopic for endometriosis    BREAST SURGERY      HYSTERECTOMY, VAGINAL      KNEE SURGERY Bilateral     X5 on each    RHINOPLASTY      TONSILLECTOMY      WRIST GANGLION EXCISION      LEFT     Prior to Admission medications    Medication Sig Start Date End Date Taking? Authorizing Provider   methylPREDNISolone (MEDROL DOSEPACK) 4 MG tablet  6/8/20  Yes Historical Provider, MD   Glucos-Chond-Hyal Ac-Ca Fructo (MOVE FREE JOINT HEALTH ADVANCE) TABS Take 2 tablets by mouth daily   Yes Historical Provider, MD   butalbital-APAP-caffeine (FIORICET) -40 MG CAPS per capsule Take 1 capsule by mouth daily as needed for Headaches 6/2/20  Yes Solange Mason DO   cyclobenzaprine (FLEXERIL) 10 MG tablet Take 1 tablet by mouth 2 times daily as needed for Muscle spasms (may cause drowsiness) 6/2/20  Yes Solange Mason DO   topiramate (TOPAMAX) 50 MG tablet Take 1 tablet by mouth 2 times daily Take two tablet in the morning and two tablets at hs. 5/12/20  Yes Solange Mason DO   NEURONTIN 400 MG capsule Take 1 capsule by mouth 3 times daily for 90 days. 4/21/20 7/20/20 Yes Donnell Lee MD   meloxicam (MOBIC) 7.5 MG tablet Take 7.5 mg by mouth daily   Yes Historical Provider, MD   NONFORMULARY Take 8 mg by mouth 2 times daily CBD oil   Yes Historical Provider, MD   Onabotulinumtoxin A (BOTOX) 200 units injection Inject 155 Units into the muscle every 30 days   Yes Historical Provider, MD   traZODone (DESYREL) 50 MG tablet Take 50 mg by mouth nightly Takes 1.5 tabs at HS   Yes Historical Provider, MD   hydrOXYzine (ATARAX) 25 MG tablet Take 25 mg by mouth 3 times daily as needed for Itching   Yes Historical Provider, MD   hydrochlorothiazide (MICROZIDE) 12.5 MG capsule Take 12.5 mg by mouth daily. Yes Historical Provider, MD   cetirizine (ZYRTEC) 10 MG tablet Take 10 mg by mouth daily.    Yes Historical Provider, MD ZOLMitriptan (ZOMIG) 5 MG tablet Take 1 tablet by mouth as needed for Migraine 2/14/20 5/14/20  Christ Mason,      Allergies   Allergen Reactions    Aimovig [Erenumab-Aooe] Swelling    Avelox [Moxifloxacin]     Charcoal Activated [Activated Charcoal] Hives    Sulfa Antibiotics      Social History     Socioeconomic History    Marital status:      Spouse name: Not on file    Number of children: Not on file    Years of education: Not on file    Highest education level: Not on file   Occupational History    Not on file   Social Needs    Financial resource strain: Not on file    Food insecurity     Worry: Not on file     Inability: Not on file    Transportation needs     Medical: Not on file     Non-medical: Not on file   Tobacco Use    Smoking status: Current Some Day Smoker     Packs/day: 0.25     Types: Cigarettes    Smokeless tobacco: Never Used   Substance and Sexual Activity    Alcohol use: Yes     Comment: socially    Drug use: Never    Sexual activity: Not on file   Lifestyle    Physical activity     Days per week: Not on file     Minutes per session: Not on file    Stress: Not on file   Relationships    Social connections     Talks on phone: Not on file     Gets together: Not on file     Attends [employer] service: Not on file     Active member of club or organization: Not on file     Attends meetings of clubs or organizations: Not on file     Relationship status: Not on file    Intimate partner violence     Fear of current or ex partner: Not on file     Emotionally abused: Not on file     Physically abused: Not on file     Forced sexual activity: Not on file   Other Topics Concern    Not on file   Social History Narrative    Not on file     Family History   Problem Relation Age of Onset    Osteoporosis Mother     Hypertension Father     Cancer Father      REVIEW OF SYSTEMS:     Augusto Graham denies fever/chills, chest pain, shortness of breath, new bowel or bladder complaints. All other review of systems was negative. PHYSICAL EXAMINATION:      /66   Pulse 90   Temp 98.3 °F (36.8 °C)   Resp 16   LMP 04/10/2018 (Exact Date)     General:       General appearance:   pleasant and well-hydrated. , in mild to moderate discomfort and A & O x3  Build:Normal Weight     HEENT:     Head:normocephalic and atraumatic  Pupils:regular, round and equal.  Sclera: icterus absent,      Lungs:     Breathing:Breathing Pattern: regular, no distress     Abdomen:     Shape:non-distended and normal  Tenderness:none     Cervical spine:     Inspection:normal  Palpation:tenderness paravertebral muscles, facet loading, left, right, negative and tenderness.   Range of motion:normal not flexion, extension rotation bilateral and is not painful.     Musculoskeletal:     Trigger points in Paraveteral:absent bilaterally  Veloz's:negative right, negative left      Extremities:     Tremors:None bilaterally upper and lower  Range of motion:Generally normal shoulders  Intact:Yes  Cyanosis:none  Edema:Normal  Tenderness over Left carpometacarpal joint of Left little finger and metacarpophalangeal joints of Left ring and little finger.     Neurological:     Sensory:normal to light touch bilateral , Tinel's negative Left Ulnar and Median   Motor:              Right Bicep5/5           Left Bicep5/5              Right Triceps5/5       Left Triceps5/5          Right Deltoid5/5     Left Deltoid5/5                  Right Quadriceps5/5          Left Quadriceps5/5           Right Gastrocnemius5/5    Left Gastrocnemius5/5  Right Ant Tibialis5/5  Left Ant Tibialis5/5  Reflexes:    Right Brachioradialis reflex2+  Left Brachioradialis reflex2+  Right Biceps reflex2+  Left Biceps reflex2+  Right Triceps reflex2+  Left Triceps reflex2+  Gait:normal     Dermatology:     Skin:no unusual rashes and no skin lesions     Impression:    Left wrist/ring and little finger   Patient had seen Neurology and had EMG of Left upper extremity which was normal  Cervical spine MRI showed C5-6 and C6-7 foraminal stenosis   ? ? Guyon's canal  Patient had also seen a rheumatologist and her workup was negative. Patient had seen hand surgeon and was told that she CRPS. Patient symptoms and signs are not consistent with CRPS    Plan:  Follow up on her neck and Left hand pain with no acute issues. Currently seeing  for Botox injections. Will consider JOSE RAMON C6-7 Left paramedian if her symptoms worsens as a diagnostic procedure. Continue with Mobic 15 mg QD. Patient wants to get weaned off Gabapentin 400 mg TID. [id number] instructions given. OARRS report reviewed 06/2020. Patient encouraged to stay active. Patient insurance denied TENS unit. Treatment plan discussed with the patient including medications side effects. We discussed with the patient that combining opioids, benzodiazepines, alcohol, illicit drugs or sleep aids increases the risk of respiratory depression including death. We discussed that these medications may cause drowsiness, sedation or dizziness and have counseled the patient not to drive or operate machinery. We have discussed that these medications will be prescribed only by one provider. We have discussed with the patient about age related risk factors and have thoroughly discussed the importance of taking these medications as prescribed. The patient verbalizes understanding. ccrefestrellitaing ilana Ogden M.D.

## 2020-06-12 RX ORDER — TOPIRAMATE 50 MG/1
TABLET, FILM COATED ORAL
Qty: 60 TABLET | Refills: 2 | OUTPATIENT
Start: 2020-06-12

## 2020-07-02 ENCOUNTER — PATIENT MESSAGE (OUTPATIENT)
Dept: PHYSICAL MEDICINE AND REHAB | Age: 55
End: 2020-07-02

## 2020-07-07 ENCOUNTER — TELEPHONE (OUTPATIENT)
Dept: PHYSICAL MEDICINE AND REHAB | Age: 55
End: 2020-07-07

## 2020-07-07 NOTE — TELEPHONE ENCOUNTER
Called and spoke with the patient to inform her that the letter is ready for her to  and it will be up front. Patient is aware and voiced understanding.

## 2020-07-14 ENCOUNTER — OFFICE VISIT (OUTPATIENT)
Dept: PHYSICAL MEDICINE AND REHAB | Age: 55
End: 2020-07-14
Payer: COMMERCIAL

## 2020-07-14 VITALS
TEMPERATURE: 97.4 F | DIASTOLIC BLOOD PRESSURE: 66 MMHG | BODY MASS INDEX: 20.8 KG/M2 | HEART RATE: 86 BPM | SYSTOLIC BLOOD PRESSURE: 118 MMHG | HEIGHT: 62 IN | WEIGHT: 113 LBS

## 2020-07-14 PROCEDURE — 64615 CHEMODENERV MUSC MIGRAINE: CPT | Performed by: PHYSICAL MEDICINE & REHABILITATION

## 2020-07-14 PROCEDURE — 20553 NJX 1/MLT TRIGGER POINTS 3/>: CPT | Performed by: PHYSICAL MEDICINE & REHABILITATION

## 2020-07-14 RX ORDER — BUTALBITAL, ACETAMINOPHEN AND CAFFEINE 300; 40; 50 MG/1; MG/1; MG/1
1 CAPSULE ORAL DAILY PRN
Qty: 14 CAPSULE | Refills: 2 | Status: SHIPPED
Start: 2020-07-14 | End: 2020-08-21 | Stop reason: SDUPTHER

## 2020-07-14 RX ORDER — LIDOCAINE HYDROCHLORIDE 10 MG/ML
4 INJECTION, SOLUTION INFILTRATION; PERINEURAL ONCE
Status: COMPLETED | OUTPATIENT
Start: 2020-07-14 | End: 2020-07-14

## 2020-07-14 RX ADMIN — LIDOCAINE HYDROCHLORIDE 4 ML: 10 INJECTION, SOLUTION INFILTRATION; PERINEURAL at 12:41

## 2020-07-14 NOTE — PROGRESS NOTES
Em Tovar D.O. [facility]  [address] [facility] [place]  Phone: [phone]  Fax: [phone]    7/14/2020    Chief Complaint   Patient presents with    Botox Injection     Botox 200 Units for Migraine       · Informed Consent:  The indications, risks, benefits, and  alternatives of onabotulinum toxin A were discussed with the patient. I explained to the patient the potential side effects including but not limited to: distant spread of toxin effect causing droopy eyelids, facial drooping, neck pain, headache, double vision, muscle pain/spasm/weakness/stiffness,  bronchitis,  injection site pain, increased blood pressure, hypersensitivity, anaphylaxis, difficulty swallowing, difficulty speaking, urinary incontinence and breathing difficulty. The patient is aware that swallowing and breathing difficulties can be life threatening and there have been reports of death in some cases where onabotulinum toxin was injected. The patient was advised of the expected benefit to include less headache days per month, and the anticipated duration of effect of 3 months. A permit was signed and scanned into the media. · Last injection: 1/29/20  · Taking anticoagulants/antiplatelets: No  · Diabetic: No  · Febrile/active infection: No    · Procedure note: After obtaining verbal and written consent from the patient for injection of  onabotulinum toxin A the patient agreed to proceed. 200 units of onabotulinum toxin A was reconstituted using 4 cc of preservative free normal saline ( 5 units per 0.1 ml). The medication was injected using a 30 g 0.5\" needle and a 1 cc syringe. The skin was prepared with Betadine.   Using a no touch technique, the injections were carried out at the following sites: bilateral Temporalis 40 units divided at 8 sites, bilateral Occipitalis 30 units divided at 6 sites, bilateral cervical paraspinals 20 units divided at 4 sites, and bilateral trapezius 60 units divided at 6 sites and 15 units right levator scapulae. The frontalis, procerus and  were not injected as the patient reports she had cosmetic Botox done recently. The total was 155 units and as a result there was 0 units of unavoidable waste. Adequate hemostasis was obtained. Ice was applied for 20 minutes post injection. The patient tolerated the procedure well. There were no complications. The patient was educated in post-procedure care including ice 20 minutes on/20 minutes off prn pain/bruising/swelling, advised to avoid hats and rubbing the forehead for 1-2 days and to call if any fevers chills, night sweats, drainage, increased pain, weakness, difficulty breathing or swallowing. Patient advised to expect improvements in about 2 weeks. After explaining the indications, risks, benefits and alternatives of a Bilateral trapezius and right rhomboid trigger points 3, the patient agreed to proceed. A permit was signed and scanned into the chart. The patient was placed in the  seated position. The skin over the trigger point was prepared with alcohol. Using aseptic no touch technique, a 22 gauge, 1 1/2\" needle with 6 cc of Xylocaine 1% was directed into the trigger point. After negative aspiration, 2 cc of  the medication was injected in a fanning out method at each of three trigger points. Adequate hemostasis was achieved and a bandage applied. The patient tolerated the procedure well and was educated in post injection care. The patient was clinically monitored after the injection and left the office without incident. There was post-injection reduction in pain and improved range of motion.  follow up 6 weeks      Ramin Oconnor, D.O., P.T.   Board Certified Physical Medicine and Rehabilitation  Board Certified Electrodiagnostic Medicine    Administrations This Visit     lidocaine 1 % injection 4 mL     Admin Date  07/14/2020  12:41 Action  Given Dose  4 mL Route  Other Site   Administered By  Reinhold Gitelman, MA    Ordering Provider:  Adrien Moody DO    NDC:  6261-2744-91    Lot#:  [id number]    :  RAKAN    Patient Supplied?:  No    Comments:  EXP: 05/2021          Onabotulinumtoxin A (BOTOX (COSMETIC)) injection 200 Units     Admin Date  07/14/2020  12:44 Action  Given Dose  200 Units Route  Intramuscular Site  Other Administered By  Reinhold Gitelman, MA    Ordering Provider:  Adrien Moody DO    NDC:  2341-4108-96    Lot#:  [id number]    :  Izabel Dolan    Patient Supplied?:  Yes    Comments:  EXP: 02/2023

## 2020-07-15 ENCOUNTER — HOSPITAL ENCOUNTER (OUTPATIENT)
Age: 55
Discharge: HOME | End: 2020-07-15
Payer: COMMERCIAL

## 2020-07-15 LAB
ALBUMIN SERPL-MCNC: 5 G/DL (ref 3.5–5.2)
ALP BLD-CCNC: 62 U/L (ref 35–104)
ALT SERPL-CCNC: 39 U/L (ref 0–32)
AST SERPL-CCNC: 35 U/L (ref 0–31)
BASOPHILS ABSOLUTE: 0.02 E9/L (ref 0–0.2)
BASOPHILS RELATIVE PERCENT: 0.4 % (ref 0–2)
BILIRUB SERPL-MCNC: 0.3 MG/DL (ref 0–1.2)
BILIRUBIN DIRECT: <0.2 MG/DL (ref 0–0.3)
BILIRUBIN, INDIRECT: ABNORMAL MG/DL (ref 0–1)
C-REACTIVE PROTEIN: <0.1 MG/DL (ref 0–0.4)
C3 COMPLEMENT: 107 MG/DL (ref 90–180)
C4 COMPLEMENT: 20 MG/DL (ref 10–40)
EOSINOPHILS ABSOLUTE: 0.06 E9/L (ref 0.05–0.5)
EOSINOPHILS RELATIVE PERCENT: 1.1 % (ref 0–6)
HCT VFR BLD CALC: 43.3 % (ref 34–48)
HEMOGLOBIN: 14.4 G/DL (ref 11.5–15.5)
IMMATURE GRANULOCYTES #: 0.01 E9/L
IMMATURE GRANULOCYTES %: 0.2 % (ref 0–5)
LYMPHOCYTES ABSOLUTE: 2.6 E9/L (ref 1.5–4)
LYMPHOCYTES RELATIVE PERCENT: 47.7 % (ref 20–42)
MCH RBC QN AUTO: 31.2 PG (ref 26–35)
MCHC RBC AUTO-ENTMCNC: 33.3 % (ref 32–34.5)
MCV RBC AUTO: 93.9 FL (ref 80–99.9)
MONOCYTES ABSOLUTE: 0.46 E9/L (ref 0.1–0.95)
MONOCYTES RELATIVE PERCENT: 8.4 % (ref 2–12)
NEUTROPHILS ABSOLUTE: 2.3 E9/L (ref 1.8–7.3)
NEUTROPHILS RELATIVE PERCENT: 42.2 % (ref 43–80)
PDW BLD-RTO: 12.9 FL (ref 11.5–15)
PLATELET # BLD: 301 E9/L (ref 130–450)
PMV BLD AUTO: 8.6 FL (ref 7–12)
RBC # BLD: 4.61 E12/L (ref 3.5–5.5)
SEDIMENTATION RATE, ERYTHROCYTE: 0 MM/HR (ref 0–20)
TOTAL PROTEIN: 7.6 G/DL (ref 6.4–8.3)
WBC # BLD: 5.5 E9/L (ref 4.5–11.5)

## 2020-07-15 PROCEDURE — 86160 COMPLEMENT ANTIGEN: CPT

## 2020-07-15 PROCEDURE — 80076 HEPATIC FUNCTION PANEL: CPT

## 2020-07-15 PROCEDURE — 83520 IMMUNOASSAY QUANT NOS NONAB: CPT

## 2020-07-15 PROCEDURE — 85651 RBC SED RATE NONAUTOMATED: CPT

## 2020-07-15 PROCEDURE — 85025 COMPLETE CBC W/AUTO DIFF WBC: CPT

## 2020-07-15 PROCEDURE — 82785 ASSAY OF IGE: CPT

## 2020-07-15 PROCEDURE — 86140 C-REACTIVE PROTEIN: CPT

## 2020-07-15 PROCEDURE — 86162 COMPLEMENT TOTAL (CH50): CPT

## 2020-07-15 PROCEDURE — 86161 COMPLEMENT/FUNCTION ACTIVITY: CPT

## 2020-07-15 PROCEDURE — 36415 COLL VENOUS BLD VENIPUNCTURE: CPT

## 2020-07-19 LAB
C1 ESTERASE INHIBITOR FUNCTIONAL ASSAY: 106 %
C1 ESTERASE INHIBITOR: 36 MG/DL (ref 21–39)
C4 COMPLEMENT: 18 MG/DL (ref 10–40)
COMPLEMENT ACTIVITY, TOTAL TURBIDIMETRIC: 31 U/ML (ref 38.7–89.9)
MISCELLANEOUS LAB TEST RESULT: ABNORMAL

## 2020-07-22 LAB — IGE: 9 KU/L

## 2020-07-23 ENCOUNTER — TELEPHONE (OUTPATIENT)
Dept: ADMINISTRATIVE | Age: 55
End: 2020-07-23

## 2020-07-23 NOTE — TELEPHONE ENCOUNTER
Can she have these trigger point injections in a different area in her back? She had botox on 07/14 for migraines. She states that she has had migraines for the past 4 days.  Please advise

## 2020-07-23 NOTE — TELEPHONE ENCOUNTER
[address] [place]     Admitting diagnosis:  abdominal aortic aneurysm  Enlarging abdominal aortic aneurysm (AAA) (HCC)    Ht: 177.8 cm (5' 10\")  Wt: 89.9 kg (198 lb 3.1 oz).  This is 132% of IBW  Body mass index is 28 Pt wants to come in today to get trigger point injections in a \"different\" area of her back. She has had migraines for the last 4 days.   She can be reached at [phone]

## 2020-07-30 ENCOUNTER — OFFICE VISIT (OUTPATIENT)
Dept: PHYSICAL MEDICINE AND REHAB | Age: 55
End: 2020-07-30
Payer: COMMERCIAL

## 2020-07-30 VITALS
HEART RATE: 85 BPM | BODY MASS INDEX: 20.24 KG/M2 | WEIGHT: 110 LBS | DIASTOLIC BLOOD PRESSURE: 68 MMHG | HEIGHT: 62 IN | SYSTOLIC BLOOD PRESSURE: 106 MMHG

## 2020-07-30 PROCEDURE — 99214 OFFICE O/P EST MOD 30 MIN: CPT | Performed by: PHYSICAL MEDICINE & REHABILITATION

## 2020-07-30 PROCEDURE — 4004F PT TOBACCO SCREEN RCVD TLK: CPT | Performed by: PHYSICAL MEDICINE & REHABILITATION

## 2020-07-30 PROCEDURE — G8427 DOCREV CUR MEDS BY ELIG CLIN: HCPCS | Performed by: PHYSICAL MEDICINE & REHABILITATION

## 2020-07-30 PROCEDURE — 3017F COLORECTAL CA SCREEN DOC REV: CPT | Performed by: PHYSICAL MEDICINE & REHABILITATION

## 2020-07-30 PROCEDURE — G8420 CALC BMI NORM PARAMETERS: HCPCS | Performed by: PHYSICAL MEDICINE & REHABILITATION

## 2020-07-30 PROCEDURE — 20553 NJX 1/MLT TRIGGER POINTS 3/>: CPT | Performed by: PHYSICAL MEDICINE & REHABILITATION

## 2020-07-30 RX ORDER — LIDOCAINE HYDROCHLORIDE 10 MG/ML
6 INJECTION, SOLUTION INFILTRATION; PERINEURAL ONCE
Status: COMPLETED | OUTPATIENT
Start: 2020-07-30 | End: 2020-07-30

## 2020-07-30 RX ORDER — AZELASTINE 1 MG/ML
1 SPRAY, METERED NASAL 2 TIMES DAILY
COMMUNITY
End: 2021-02-16

## 2020-07-30 RX ORDER — CYPROHEPTADINE HYDROCHLORIDE 4 MG/1
4 TABLET ORAL 2 TIMES DAILY
COMMUNITY
End: 2022-02-10

## 2020-07-30 RX ADMIN — LIDOCAINE HYDROCHLORIDE 6 ML: 10 INJECTION, SOLUTION INFILTRATION; PERINEURAL at 16:13

## 2020-07-30 NOTE — PROGRESS NOTES
Saúl Larson D.O. [facility]  [address] [facility] [place]  Phone: [phone]  Fax: [phone]        7/30/20    Chief Complaint   Patient presents with    Migraine     follow up    Neck Pain       HPI:  Jomar Farley is a 49y.o. year old woman seen today in follow up regarding neck pain. Interval history: Since the last visit the patient has had increased headaches. Her allergist prescribed her a Medrol dosepak and the headaches started to decrease. Today, the pain is rated Pain Score:   4 where 0 is no pain and 10 is pain as bad as it can be. The pain is located in the neck,  radiates proximally to the head, and is described as aching, tight. This pain occurs intermittently. The symptoms have been better since onset. Symptoms are exacerbated by working at computer. Factors which relieve the pain include stretching, rest, Botox. Other associated symptoms include paresthesias left arm. Otherwise, the pain assessment has not changed since the last visit.      Past Medical History:   Diagnosis Date    Endometriosis     Headache     Hypertension     Osteoarthritis        Past Surgical History:   Procedure Laterality Date    ABDOMEN SURGERY      X2 Laproscopic for endometriosis    BREAST SURGERY      HYSTERECTOMY, VAGINAL      KNEE SURGERY Bilateral     X5 on each    RHINOPLASTY      TONSILLECTOMY      WRIST GANGLION EXCISION      LEFT       Social History     Tobacco Use    Smoking status: Current Some Day Smoker     Packs/day: 0.25     Types: Cigarettes    Smokeless tobacco: Never Used   Substance Use Topics    Alcohol use: Yes     Comment: socially    Drug use: Never       Family History   Problem Relation Age of Onset    Osteoporosis Mother     Hypertension Father     Cancer Father        Current Outpatient Medications   Medication Sig Dispense Refill    cyproheptadine (PERIACTIN) 4 MG tablet Take 4 mg by mouth 2 times daily      azelastine (ASTELIN) 0.1 % nasal spray 1 spray by Nasal route 2 times daily Use in each nostril as directed      butalbital-APAP-caffeine (FIORICET) -40 MG CAPS per capsule Take 1 capsule by mouth daily as needed for Headaches 14 capsule 2    Glucos-Chond-Hyal Ac-Ca Fructo (MOVE FREE JOINT HEALTH ADVANCE) TABS Take 2 tablets by mouth daily      TURMERIC PO Take by mouth      cyclobenzaprine (FLEXERIL) 10 MG tablet Take 1 tablet by mouth 2 times daily as needed for Muscle spasms (may cause drowsiness) 180 tablet 3    topiramate (TOPAMAX) 50 MG tablet Take 1 tablet by mouth 2 times daily Take two tablet in the morning and two tablets at hs. 60 tablet 2    NEURONTIN 400 MG capsule Take 1 capsule by mouth 3 times daily for 90 days. 270 capsule 0    meloxicam (MOBIC) 7.5 MG tablet Take 7.5 mg by mouth daily      ZOLMitriptan (ZOMIG) 5 MG tablet Take 1 tablet by mouth as needed for Migraine 27 tablet 3    NONFORMULARY Take 8 mg by mouth 2 times daily CBD oil      Onabotulinumtoxin A (BOTOX) 200 units injection Inject 155 Units into the muscle every 30 days      traZODone (DESYREL) 50 MG tablet Take 50 mg by mouth nightly Takes 1.5 tabs at HS      hydrochlorothiazide (MICROZIDE) 12.5 MG capsule Take 12.5 mg by mouth daily. No current facility-administered medications for this visit. Allergies   Allergen Reactions    Aimovig [Erenumab-Aooe] Swelling    Avelox [Moxifloxacin]     Charcoal Activated [Activated Charcoal] Hives    Sulfa Antibiotics        Review of Systems:  No new weakness, paresthesia, incontinence of bowel or bladder, saddle anesthesia, falls or gait dysfunction. Otherwise, per HPI. Physical Exam:   Blood pressure 106/68, pulse 85, height 5' 2\" (1.575 m), weight 110 lb (49.9 kg), last menstrual period 04/10/2018. GENERAL: The patient is in no apparent distress. Body habitus is non-obese. HEENT: No rhinorrhea, sneezing, yawning, or lacrimation.  No scleral icterus or conjunctival injection. SKIN: No piloerection. No tract marks. No rash. PSYCH: Mood and affect are appropriate. Hygiene is appropriate. CARDIOVASCULAR  Heart is regular rate and rhythm. There is no edema. RESPIRATORY: Respirations are regular and unlabored. There is no cyanosis. GASTROINTESTINAL: Soft abdomen, non-tender. MSK: There is no joint effusion, deformity, instability, swelling, erythema or warmth. AROM is full in the spine and extremities. Spinal curvatures are normal.   Trigger point present bilateral trapezius and right rhomboid    NEURO: Gait is normal. No focal sensorimotor deficit. Reflexes 2+ and symmetric in lower extremities. Impression:   1. Chronic migraine without aura without status migrainosus, not intractable    2. Trigger point    3. Spondylosis of cervical region without myelopathy or radiculopathy    4. Myofascial pain syndrome, cervical        Plan:  Continue current medications. Continue home exercises. Trigger point injections today    Medications Discontinued During This Encounter   Medication Reason    cetirizine (ZYRTEC) 10 MG tablet Therapy completed    hydrOXYzine (ATARAX) 25 MG tablet Therapy completed    methylPREDNISolone (MEDROL DOSEPACK) 4 MG tablet Therapy completed     The patient was educated about the diagnosis, prognosis, indications, risks and benefits of treatment. An opportunity to ask questions was given to the patient and questions were answered. The patient agreed to proceed with the recommended treatment as described above. Follow up 6 weeks  Thank you for allowing me to participate in the care of your patient. Miguel Boone D.O., P.T. Board Certified Physical Medicine and Rehabilitation  Board Certified [facility]. [address] [facility]. [facility]  [address] [facility].  [address] [place] [address]  Phone: [phone]  Fax: [phone]    7/30/2020    Chief Complaint   Patient presents with    Migraine     follow up    Neck Pain       Last injection: 6/2/20  Taking anticoagulants/antiplatelets: No  Diabetic: No  Febrile/active infection: No    After explaining the indications, risks, benefits and alternatives of a Bilateral trapezius and right rhomboid trigger point, the patient agreed to proceed. A permit was signed and scanned into the chart. The patient was placed in the  seated position. The skin over the trigger point was prepared with alcohol. Using aseptic no touch technique, a 22 gauge, 1 1/2\" needle with 2 cc of Xylocaine 1% was directed into the trigger point. After negative aspiration, the medication was injected in a fanning out method. Adequate hemostasis was achieved and a bandage applied. The patient tolerated the procedure well and was educated in post injection care. The patient was clinically monitored after the injection and left the office without incident. There was post-injection reduction in pain and improved range of motion. Hany Traore D.O., P.T.   Board Certified Physical Medicine and Rehabilitation  Board Certified Electrodiagnostic Medicine    Administrations This Visit     lidocaine 1 % injection 6 mL     Admin Date  07/30/2020  16:13 Action  Given Dose  6 mL Route  Other Site   Administered By  Travis Neely MA    Ordering Provider:  Melba Peres DO    NDC:  8278-6807-28    Lot#:  [id number]    :  Frank Peralta    Patient Supplied?:  No    Comments:  EXP: 05/2021

## 2020-08-20 ENCOUNTER — HOSPITAL ENCOUNTER (OUTPATIENT)
Dept: MAMMOGRAPHY | Age: 55
Discharge: HOME | End: 2020-08-22
Payer: COMMERCIAL

## 2020-08-20 PROCEDURE — 77063 BREAST TOMOSYNTHESIS BI: CPT

## 2020-08-21 ENCOUNTER — PATIENT MESSAGE (OUTPATIENT)
Dept: PHYSICAL MEDICINE AND REHAB | Age: 55
End: 2020-08-21

## 2020-08-21 NOTE — TELEPHONE ENCOUNTER
Last sent Fioricet -40mg daily prn #14 with 2 refills on 7/14/20. Patient was last seen on 7/30/20 with pending follow up on 8/31/20. Since she has 3 left, I keyed a quantity of 7 to last until her next appointment. Please advise.

## 2020-08-26 RX ORDER — BUTALBITAL, ACETAMINOPHEN AND CAFFEINE 300; 40; 50 MG/1; MG/1; MG/1
1 CAPSULE ORAL DAILY PRN
Qty: 7 CAPSULE | Refills: 0 | Status: SHIPPED
Start: 2020-08-26 | End: 2021-09-03

## 2020-08-26 NOTE — TELEPHONE ENCOUNTER
Called and spoke with the patient to inform her that her script for Fioricet was sent to her pharmacy. Patient is aware and voice understanding.

## 2020-08-31 ENCOUNTER — OFFICE VISIT (OUTPATIENT)
Dept: PHYSICAL MEDICINE AND REHAB | Age: 55
End: 2020-08-31
Payer: COMMERCIAL

## 2020-08-31 VITALS
DIASTOLIC BLOOD PRESSURE: 68 MMHG | HEIGHT: 62 IN | WEIGHT: 112 LBS | HEART RATE: 83 BPM | BODY MASS INDEX: 20.61 KG/M2 | SYSTOLIC BLOOD PRESSURE: 118 MMHG

## 2020-08-31 PROCEDURE — 1036F TOBACCO NON-USER: CPT | Performed by: PHYSICAL MEDICINE & REHABILITATION

## 2020-08-31 PROCEDURE — 3017F COLORECTAL CA SCREEN DOC REV: CPT | Performed by: PHYSICAL MEDICINE & REHABILITATION

## 2020-08-31 PROCEDURE — G8420 CALC BMI NORM PARAMETERS: HCPCS | Performed by: PHYSICAL MEDICINE & REHABILITATION

## 2020-08-31 PROCEDURE — 99214 OFFICE O/P EST MOD 30 MIN: CPT | Performed by: PHYSICAL MEDICINE & REHABILITATION

## 2020-08-31 PROCEDURE — G8427 DOCREV CUR MEDS BY ELIG CLIN: HCPCS | Performed by: PHYSICAL MEDICINE & REHABILITATION

## 2020-08-31 PROCEDURE — 20553 NJX 1/MLT TRIGGER POINTS 3/>: CPT | Performed by: PHYSICAL MEDICINE & REHABILITATION

## 2020-08-31 RX ORDER — ZOLMITRIPTAN 5 MG/1
5 TABLET, FILM COATED ORAL PRN
Qty: 27 TABLET | Refills: 3 | Status: SHIPPED
Start: 2020-08-31 | End: 2020-11-16 | Stop reason: ALTCHOICE

## 2020-08-31 RX ORDER — GABAPENTIN 300 MG/1
CAPSULE ORAL
Qty: 360 CAPSULE | Refills: 3 | Status: SHIPPED
Start: 2020-08-31 | End: 2020-11-16 | Stop reason: SDUPTHER

## 2020-08-31 RX ORDER — LASMIDITAN 50 MG/1
50 TABLET ORAL DAILY PRN
Qty: 10 TABLET | Refills: 2 | Status: SHIPPED | OUTPATIENT
Start: 2020-08-31

## 2020-08-31 RX ORDER — TOPIRAMATE 50 MG/1
50 TABLET, FILM COATED ORAL 2 TIMES DAILY
Qty: 180 TABLET | Refills: 3 | Status: SHIPPED
Start: 2020-08-31 | End: 2021-11-10 | Stop reason: SDUPTHER

## 2020-08-31 RX ORDER — BUTALBITAL, ACETAMINOPHEN AND CAFFEINE 300; 40; 50 MG/1; MG/1; MG/1
1 CAPSULE ORAL DAILY PRN
Qty: 14 CAPSULE | Refills: 2 | Status: SHIPPED
Start: 2020-08-31 | End: 2020-11-16 | Stop reason: SDUPTHER

## 2020-08-31 RX ORDER — LIDOCAINE HYDROCHLORIDE 10 MG/ML
6 INJECTION, SOLUTION INFILTRATION; PERINEURAL ONCE
Status: COMPLETED | OUTPATIENT
Start: 2020-08-31 | End: 2020-08-31

## 2020-08-31 RX ORDER — BUTALBITAL, ACETAMINOPHEN AND CAFFEINE 300; 40; 50 MG/1; MG/1; MG/1
1 CAPSULE ORAL DAILY PRN
Qty: 7 CAPSULE | Refills: 0 | Status: CANCELLED | OUTPATIENT
Start: 2020-08-31

## 2020-08-31 RX ADMIN — LIDOCAINE HYDROCHLORIDE 6 ML: 10 INJECTION, SOLUTION INFILTRATION; PERINEURAL at 16:07

## 2020-08-31 NOTE — PROGRESS NOTES
Osteoporosis Mother     Hypertension Father     Cancer Father        Current Outpatient Medications   Medication Sig Dispense Refill    topiramate (TOPAMAX) 50 MG tablet Take 1 tablet by mouth 2 times daily Take two tablet in the morning and two tablets at hs. 180 tablet 3    ZOLMitriptan (ZOMIG) 5 MG tablet Take 1 tablet by mouth as needed for Migraine 27 tablet 3    Lasmiditan Succinate (REYVOW) 50 MG TABS Take 50 mg by mouth daily as needed (headache) 10 tablet 2    gabapentin (NEURONTIN) 300 MG capsule Take 1 capsule by mouth 2 times daily AND 2 capsules nightly. Do all this for 30 days. 300 mg in am and at midday, 600 mg qhs. . 360 capsule 3    butalbital-APAP-caffeine (FIORICET) -40 MG CAPS per capsule Take 1 capsule by mouth daily as needed for Headaches 14 capsule 2    butalbital-APAP-caffeine (FIORICET) -40 MG CAPS per capsule Take 1 capsule by mouth daily as needed for Headaches 7 capsule 0    cyproheptadine (PERIACTIN) 4 MG tablet Take 4 mg by mouth 2 times daily      azelastine (ASTELIN) 0.1 % nasal spray 1 spray by Nasal route 2 times daily Use in each nostril as directed      Glucos-Chond-Hyal Ac-Ca Fructo (MOVE FREE JOINT HEALTH ADVANCE) TABS Take 2 tablets by mouth daily      TURMERIC PO Take by mouth      cyclobenzaprine (FLEXERIL) 10 MG tablet Take 1 tablet by mouth 2 times daily as needed for Muscle spasms (may cause drowsiness) 180 tablet 3    meloxicam (MOBIC) 7.5 MG tablet Take 7.5 mg by mouth daily      NONFORMULARY Take 8 mg by mouth 2 times daily CBD oil      Onabotulinumtoxin A (BOTOX) 200 units injection Inject 155 Units into the muscle every 30 days      traZODone (DESYREL) 50 MG tablet Take 50 mg by mouth nightly Takes 1.5 tabs at HS      hydrochlorothiazide (MICROZIDE) 12.5 MG capsule Take 12.5 mg by mouth daily.  NEURONTIN 400 MG capsule Take 1 capsule by mouth 3 times daily for 90 days.  270 capsule 0     No current facility-administered medications for this visit. Allergies   Allergen Reactions    Aimovig [Erenumab-Aooe] Swelling    Avelox [Moxifloxacin]     Charcoal Activated [Activated Charcoal] Hives    Sulfa Antibiotics        Review of Systems:  No new weakness, paresthesia, incontinence of bowel or bladder, saddle anesthesia, falls or gait dysfunction. Otherwise, per HPI. Physical Exam:   Blood pressure 118/68, pulse 83, height 5' 2\" (1.575 m), weight 112 lb (50.8 kg), last menstrual period 04/10/2018. GENERAL: The patient is in no apparent distress. Body habitus is non-obese. HEENT: No rhinorrhea, sneezing, yawning, or lacrimation. No scleral icterus or conjunctival injection. SKIN: No piloerection. No tract marks. No rash. PSYCH: Mood and affect are appropriate. Hygiene is appropriate. CARDIOVASCULAR  Heart is regular rate and rhythm. There is no edema. RESPIRATORY: Respirations are regular and unlabored. There is no cyanosis. GASTROINTESTINAL: Soft abdomen, non-tender. MSK: There is no joint effusion, deformity, instability, swelling, erythema or warmth. AROM is full in the spine and extremities. Spinal curvatures are normal.   Trigger point present bilateral trapezius and right rhomboid    NEURO: Gait is normal. No focal sensorimotor deficit. Reflexes 2+ and symmetric in lower extremities. Impression:   1. Trigger point    2. Chronic migraine without aura without status migrainosus, not intractable    3. Spondylosis of cervical region without myelopathy or radiculopathy    4. Chronic neck pain        Plan:  Orders Placed This Encounter   Medications    topiramate (TOPAMAX) 50 MG tablet     Sig: Take 1 tablet by mouth 2 times daily Take two tablet in the morning and two tablets at hs.      Dispense:  180 tablet     Refill:  3    ZOLMitriptan (ZOMIG) 5 MG tablet     Sig: Take 1 tablet by mouth as needed for Migraine     Dispense:  27 tablet     Refill:  3    Lasmiditan Succinate (REYVOW) 50 MG TABS     Sig: Take 50 mg by mouth daily as needed (headache)     Dispense:  10 tablet     Refill:  2    gabapentin (NEURONTIN) 300 MG capsule     Sig: Take 1 capsule by mouth 2 times daily AND 2 capsules nightly. Do all this for 30 days. 300 mg in am and at midday, 600 mg qhs. .     Dispense:  360 capsule     Refill:  3    butalbital-APAP-caffeine (FIORICET) -40 MG CAPS per capsule     Sig: Take 1 capsule by mouth daily as needed for Headaches     Dispense:  14 capsule     Refill:  2    lidocaine 1 % injection 6 mL       Controlled Substance Monitoring:    Acute and Chronic Pain Monitoring:   RX Monitoring 10/8/2019   Periodic Controlled Substance Monitoring No signs of potential drug abuse or diversion identified. Continue home exercises. Trigger point injections today    Medications Discontinued During This Encounter   Medication Reason    topiramate (TOPAMAX) 50 MG tablet REORDER    ZOLMitriptan (ZOMIG) 5 MG tablet REORDER     The patient was educated about the diagnosis, prognosis, indications, risks and benefits of treatment. An opportunity to ask questions was given to the patient and questions were answered. The patient agreed to proceed with the recommended treatment as described above. Follow up 6 weeks  Thank you for allowing me to participate in the care of your patient. Joleen Monzon D.O., P.T. Board Certified Physical Medicine and Rehabilitation  Board Certified [facility]. [address] [facility]. [facility]  [address] [facility].  [facility]  Phone: [phone]  Fax: [phone]    8/31/2020    Chief Complaint   Patient presents with    Migraine     6 week follow up after botox        Last injection: 7/30/20  Taking anticoagulants/antiplatelets: No  Diabetic: No  Febrile/active infection: No    After explaining the indications, risks, benefits and alternatives of a Bilateral trapezius and right rhomboid trigger point, the patient agreed to proceed. A permit was signed and scanned into the chart. The patient was placed in the  seated position. The skin over the trigger point was prepared with alcohol. Using aseptic no touch technique, a 22 gauge, 1 1/2\" needle with 6cc of Xylocaine 1% was directed into the trigger point. After negative aspiration, 2 cc of the medication was injected in a fanning out method at each of 3 trigger points. Adequate hemostasis was achieved and a bandage applied. The patient tolerated the procedure well and was educated in post injection care. The patient was clinically monitored after the injection and left the office without incident. There was post-injection reduction in pain and improved range of motion. David Neal D.O., P.T.   Board Certified Physical Medicine and Rehabilitation  Board Certified Electrodiagnostic Medicine    Administrations This Visit     lidocaine 1 % injection 6 mL     Admin Date  08/31/2020  16:07 Action  Given Dose  6 mL Route  Other Site   Administered By  Pedro Jones MA    Ordering Provider:  Prince Gaxiola DO    NDC:  8069-8813-28    Lot#:  [id number]    :  Jg Farley    Patient Supplied?:  No    Comments:  EXP:  07/2021

## 2020-09-02 ENCOUNTER — TELEPHONE (OUTPATIENT)
Dept: PHYSICAL MEDICINE AND REHAB | Age: 55
End: 2020-09-02

## 2020-10-01 ENCOUNTER — TELEPHONE (OUTPATIENT)
Dept: PHYSICAL MEDICINE AND REHAB | Age: 55
End: 2020-10-01

## 2020-10-01 NOTE — TELEPHONE ENCOUNTER
Pt called stated migraine last 5 days. Her next appt is on 10-12-20 for Botox, however she states she is getting  on 10-09-20 and would like to have injections prior to the wedding.   Please review and call pt with recommendations [phone]

## 2020-10-07 NOTE — TELEPHONE ENCOUNTER
Called and spoke with the patient to inform her that we were short staffed and right now we do not have anything sooner. Patient stated that her PCP gave her a shot of Toradol and that helped.

## 2020-10-12 ENCOUNTER — OFFICE VISIT (OUTPATIENT)
Dept: PHYSICAL MEDICINE AND REHAB | Age: 55
End: 2020-10-12
Payer: COMMERCIAL

## 2020-10-12 VITALS
WEIGHT: 112 LBS | DIASTOLIC BLOOD PRESSURE: 75 MMHG | HEIGHT: 62 IN | BODY MASS INDEX: 20.61 KG/M2 | SYSTOLIC BLOOD PRESSURE: 113 MMHG | HEART RATE: 84 BPM

## 2020-10-12 PROCEDURE — 20552 NJX 1/MLT TRIGGER POINT 1/2: CPT | Performed by: PHYSICAL MEDICINE & REHABILITATION

## 2020-10-12 PROCEDURE — 64615 CHEMODENERV MUSC MIGRAINE: CPT | Performed by: PHYSICAL MEDICINE & REHABILITATION

## 2020-10-12 RX ORDER — LIDOCAINE HYDROCHLORIDE 10 MG/ML
3 INJECTION, SOLUTION INFILTRATION; PERINEURAL ONCE
Status: COMPLETED | OUTPATIENT
Start: 2020-10-12 | End: 2020-10-13

## 2020-10-12 NOTE — PROGRESS NOTES
Edel Montalvo D.O. [facility]  [address] [facility] [place]  Phone: [phone]  Fax: [phone]    10/12/2020    Chief Complaint   Patient presents with    Injections     head for migraine headache       · Informed Consent:  The indications, risks, benefits, and  alternatives of onabotulinum toxin A were discussed with the patient. I explained to the patient the potential side effects including but not limited to: distant spread of toxin effect causing droopy eyelids, facial drooping, neck pain, headache, double vision, muscle pain/spasm/weakness/stiffness,  bronchitis,  injection site pain, increased blood pressure, hypersensitivity, anaphylaxis, difficulty swallowing, difficulty speaking, urinary incontinence and breathing difficulty. The patient is aware that swallowing and breathing difficulties can be life threatening and there have been reports of death in some cases where onabotulinum toxin was injected. The patient was advised of the expected benefit to include less headache days per month, and the anticipated duration of effect of 3 months. A permit was signed and scanned into the media. · Last injection: 7/14/20  · Taking anticoagulants/antiplatelets: No  · Diabetic: No  · Febrile/active infection: No    · Procedure note: After obtaining verbal and written consent from the patient for injection of  onabotulinum toxin A the patient agreed to proceed. 200 units of onabotulinum toxin A was reconstituted using 4 cc of preservative free normal saline ( 5 units per 0.1 ml). The medication was injected using a 30 g 0.5\" needle and a 1 cc syringe. The skin was prepared with Betadine.   Using a no touch technique, the injections were carried out at the following sites: bilateral Temporalis 40 units divided at 8 sites, bilateral Occipitalis 30 units divided at 6 sites, bilateral cervical paraspinals 20 units divided at 4 sites, and bilateral trapezius 30 units divided at 6 sites. The total was 120 units and as a result there was 45 units of unavoidable waste. Adequate hemostasis was obtained. Ice was applied for 20 minutes post injection. The patient tolerated the procedure well. There were no complications. The patient was educated in post-procedure care including ice 20 minutes on/20 minutes off prn pain/bruising/swelling, advised to avoid hats and rubbing the forehead for 1-2 days and to call if any fevers chills, night sweats, drainage, increased pain, weakness, difficulty breathing or swallowing. Patient advised to expect improvements in about 2 weeks.  follow up 6 weeks      Girish Colbert D.O., P.T. Board Certified Physical Medicine and Rehabilitation  Board Certified [address] [place] [address], [address] [place] [address]. [facility]  [facility]. [facility]  Phone: [phone]  Fax: [phone]    10/12/2020    Chief Complaint   Patient presents with    Injections     head for migraine headache       Last injection: 8/31/20  Taking anticoagulants/antiplatelets: No  Diabetic: No  Febrile/active infection: No    After explaining the indications, risks, benefits and alternatives of a Right trapezius trigger point, the patient agreed to proceed. A permit was signed and scanned into the chart. The patient was placed in the  seated position. The skin over the trigger point was prepared with alcohol. Using aseptic no touch technique, a 22 gauge, 1 1/2\" needle with 2 cc of Xylocaine 1% was directed into the trigger point. After negative aspiration, the medication was injected in a fanning out method. Adequate hemostasis was achieved and a bandage applied. The patient tolerated the procedure well and was educated in post injection care. The patient was clinically monitored after the injection and left the office without incident.  There was post-injection reduction in pain and improved range of motion. Cornelio Almazan D.O., P.T.   Board Certified Physical Medicine and Rehabilitation  Board Certified Electrodiagnostic Medicine

## 2020-10-13 RX ADMIN — LIDOCAINE HYDROCHLORIDE 3 ML: 10 INJECTION, SOLUTION INFILTRATION; PERINEURAL at 08:22

## 2020-11-16 ENCOUNTER — OFFICE VISIT (OUTPATIENT)
Dept: PHYSICAL MEDICINE AND REHAB | Age: 55
End: 2020-11-16
Payer: COMMERCIAL

## 2020-11-16 VITALS
DIASTOLIC BLOOD PRESSURE: 64 MMHG | HEART RATE: 82 BPM | SYSTOLIC BLOOD PRESSURE: 102 MMHG | HEIGHT: 62 IN | WEIGHT: 114 LBS | BODY MASS INDEX: 20.98 KG/M2

## 2020-11-16 PROCEDURE — G8420 CALC BMI NORM PARAMETERS: HCPCS | Performed by: PHYSICAL MEDICINE & REHABILITATION

## 2020-11-16 PROCEDURE — G8427 DOCREV CUR MEDS BY ELIG CLIN: HCPCS | Performed by: PHYSICAL MEDICINE & REHABILITATION

## 2020-11-16 PROCEDURE — 99214 OFFICE O/P EST MOD 30 MIN: CPT | Performed by: PHYSICAL MEDICINE & REHABILITATION

## 2020-11-16 PROCEDURE — 1036F TOBACCO NON-USER: CPT | Performed by: PHYSICAL MEDICINE & REHABILITATION

## 2020-11-16 PROCEDURE — G8484 FLU IMMUNIZE NO ADMIN: HCPCS | Performed by: PHYSICAL MEDICINE & REHABILITATION

## 2020-11-16 PROCEDURE — 20553 NJX 1/MLT TRIGGER POINTS 3/>: CPT | Performed by: PHYSICAL MEDICINE & REHABILITATION

## 2020-11-16 PROCEDURE — 3017F COLORECTAL CA SCREEN DOC REV: CPT | Performed by: PHYSICAL MEDICINE & REHABILITATION

## 2020-11-16 RX ORDER — BUTALBITAL, ACETAMINOPHEN AND CAFFEINE 300; 40; 50 MG/1; MG/1; MG/1
1 CAPSULE ORAL DAILY PRN
Qty: 28 CAPSULE | Refills: 2 | Status: SHIPPED
Start: 2020-11-16 | End: 2020-12-01 | Stop reason: SDUPTHER

## 2020-11-16 RX ORDER — GABAPENTIN 300 MG/1
CAPSULE ORAL
Qty: 450 CAPSULE | Refills: 3 | Status: SHIPPED
Start: 2020-11-16 | End: 2021-09-17

## 2020-11-16 RX ORDER — LIDOCAINE HYDROCHLORIDE 10 MG/ML
8 INJECTION, SOLUTION INFILTRATION; PERINEURAL ONCE
Status: COMPLETED | OUTPATIENT
Start: 2020-11-16 | End: 2020-11-16

## 2020-11-16 RX ADMIN — LIDOCAINE HYDROCHLORIDE 8 ML: 10 INJECTION, SOLUTION INFILTRATION; PERINEURAL at 13:40

## 2020-11-16 NOTE — PROGRESS NOTES
Cornelio Almazan D.O. [facility]   [address] [facility] [place]  Phone: [phone]  Fax: [phone]        20    Chief Complaint   Patient presents with    Migraine     6 week follow up after botox       HPI:  Gregory Scruggs is a 48y.o. year old woman seen today in follow up regarding neck pain. Interval history: Since the last visit the patient is scheduled for cervical spine surgery. Her HIT- 6 today was 62. Today, the pain is rated Pain Score:   8 where 0 is no pain and 10 is pain as bad as it can be. The pain is located in the top of the head in a halo pattern and does not radiate and is described as aching, tight. This pain occurs intermittently. The symptoms have been better since onset. Symptoms are exacerbated by working at computer. Factors which relieve the pain include stretching, rest, Botox. Other associated symptoms include paresthesias left arm. Otherwise, the pain assessment has not changed since the last visit.      Past Medical History:   Diagnosis Date    Endometriosis     Headache     Hypertension     Osteoarthritis        Past Surgical History:   Procedure Laterality Date    ABDOMEN SURGERY      X2 Laproscopic for endometriosis    BREAST SURGERY      HYSTERECTOMY, VAGINAL      KNEE SURGERY Bilateral     X5 on each    RHINOPLASTY      TONSILLECTOMY      WRIST GANGLION EXCISION      LEFT       Social History     Tobacco Use    Smoking status: Former Smoker     Packs/day: 0.25     Types: Cigarettes     Last attempt to quit: 2020     Years since quittin.2    Smokeless tobacco: Never Used   Substance Use Topics    Alcohol use: Yes     Comment: socially    Drug use: Never       Family History   Problem Relation Age of Onset    Osteoporosis Mother     Hypertension Father     Cancer Father        Current Outpatient Medications   Medication Sig Dispense Refill    butalbital-APAP-caffeine (FIORICET) -40 MG CAPS per capsule Take 1 capsule by mouth daily as needed for Headaches 28 capsule 2    gabapentin (NEURONTIN) 300 MG capsule Take 1 capsule by mouth Daily with lunch AND 2 capsules 2 times daily. Do all this for 30 days. 300 mg in am and at midday, 600 mg qhs. . 450 capsule 3    topiramate (TOPAMAX) 50 MG tablet Take 1 tablet by mouth 2 times daily Take two tablet in the morning and two tablets at hs. 180 tablet 3    Lasmiditan Succinate (REYVOW) 50 MG TABS Take 50 mg by mouth daily as needed (headache) 10 tablet 2    butalbital-APAP-caffeine (FIORICET) -40 MG CAPS per capsule Take 1 capsule by mouth daily as needed for Headaches (Patient taking differently: Take 1 capsule by mouth daily as needed for Headaches ) 7 capsule 0    cyproheptadine (PERIACTIN) 4 MG tablet Take 4 mg by mouth 2 times daily      azelastine (ASTELIN) 0.1 % nasal spray 1 spray by Nasal route 2 times daily Use in each nostril as directed      Glucos-Chond-Hyal Ac-Ca Fructo (MOVE FREE JOINT HEALTH ADVANCE) TABS Take 2 tablets by mouth daily      TURMERIC PO Take by mouth      cyclobenzaprine (FLEXERIL) 10 MG tablet Take 1 tablet by mouth 2 times daily as needed for Muscle spasms (may cause drowsiness) (Patient taking differently: Take 10 mg by mouth 2 times daily as needed for Muscle spasms (may cause drowsiness) ) 180 tablet 3    meloxicam (MOBIC) 7.5 MG tablet Take 7.5 mg by mouth daily      NONFORMULARY Take 8 mg by mouth 2 times daily CBD oil      Onabotulinumtoxin A (BOTOX) 200 units injection Inject 155 Units into the muscle every 30 days      traZODone (DESYREL) 50 MG tablet Take 50 mg by mouth nightly Takes 1.5 tabs at HS      hydrochlorothiazide (MICROZIDE) 12.5 MG capsule Take 12.5 mg by mouth daily.  NEURONTIN 400 MG capsule Take 1 capsule by mouth 3 times daily for 90 days. 270 capsule 0     No current facility-administered medications for this visit.         Allergies   Allergen Reactions    Aimovig [Erenumab-Aooe] Swelling    Avelox [Moxifloxacin]     Charcoal Activated [Activated Charcoal] Hives    Sulfa Antibiotics        Review of Systems:  No new weakness, paresthesia, incontinence of bowel or bladder, saddle anesthesia, falls or gait dysfunction. Otherwise, per HPI. Physical Exam:   Blood pressure 102/64, pulse 82, height 5' 2\" (1.575 m), weight 114 lb (51.7 kg), last menstrual period 04/10/2018. GENERAL: The patient is in no apparent distress. Body habitus is non-obese. HEENT: No rhinorrhea, sneezing, yawning, or lacrimation. No scleral icterus or conjunctival injection. SKIN: No piloerection. No tract marks. No rash. PSYCH: Mood and affect are appropriate. Hygiene is appropriate. CARDIOVASCULAR  Heart is regular rate and rhythm. There is no edema. RESPIRATORY: Respirations are regular and unlabored. There is no cyanosis. GASTROINTESTINAL: Soft abdomen, non-tender. MSK: There is no joint effusion, deformity, instability, swelling, erythema or warmth. AROM is full in the spine and extremities. Spinal curvatures are normal.   Trigger point present bilateral trapezius and right rhomboid    NEURO: Gait is normal. No focal sensorimotor deficit. Reflexes 2+ and symmetric in lower extremities. Impression:   1. Trigger point    2. Chronic migraine without aura without status migrainosus, not intractable    3. Spondylosis of cervical region without myelopathy or radiculopathy    4. Chronic neck pain        Plan:  Orders Placed This Encounter   Medications    butalbital-APAP-caffeine (FIORICET) -40 MG CAPS per capsule     Sig: Take 1 capsule by mouth daily as needed for Headaches     Dispense:  28 capsule     Refill:  2    gabapentin (NEURONTIN) 300 MG capsule     Sig: Take 1 capsule by mouth Daily with lunch AND 2 capsules 2 times daily. Do all this for 30 days. 300 mg in am and at midday, 600 mg qhs. .     Dispense:  450 capsule     Refill:  3    lidocaine 1 % injection 8 mL       Controlled Substance Monitoring:    Acute and Chronic Pain Monitoring:   RX Monitoring 10/8/2019   Periodic Controlled Substance Monitoring No signs of potential drug abuse or diversion identified. Continue home exercises. Trigger point injections today    Medications Discontinued During This Encounter   Medication Reason    ZOLMitriptan (ZOMIG) 5 MG tablet DISCONTINUED BY ANOTHER CLINICIAN    butalbital-APAP-caffeine (FIORICET) -40 MG CAPS per capsule REORDER    gabapentin (NEURONTIN) 300 MG capsule REORDER     The patient was educated about the diagnosis, prognosis, indications, risks and benefits of treatment. An opportunity to ask questions was given to the patient and questions were answered. The patient agreed to proceed with the recommended treatment as described above. Follow up 6 weeks  Thank you for allowing me to participate in the care of your patient. Girish Colbert D.O., P.T. Board Certified Physical Medicine and Rehabilitation  Board Certified [facility] [address] [facility]. [facility]  [facility]. [address] [place] [address]  Phone: [phone]  Fax: [phone]    11/16/2020    Chief Complaint   Patient presents with    Migraine     6 week follow up after botox       Last injection:8/31/20  Taking anticoagulants/antiplatelets: No  Diabetic: No  Febrile/active infection: No    After explaining the indications, risks, benefits and alternatives of a Bilateral trapezius and bilateral  rhomboid trigger point, the patient agreed to proceed. A permit was signed and scanned into the chart. The patient was placed in the  seated position. The skin over the trigger point was prepared with alcohol. Using aseptic no touch technique, a 22 gauge, 1 1/2\" needle with 8cc of Xylocaine 1% was directed into the trigger point.   After negative aspiration, 2 cc of the medication was injected in a fanning out method at each of 3 trigger points. Adequate hemostasis was achieved and a bandage applied. The patient tolerated the procedure well and was educated in post injection care. The patient was clinically monitored after the injection and left the office without incident. There was post-injection reduction in pain and improved range of motion. Mundo Diaz D.O. P.T.   Board Certified Physical Medicine and Rehabilitation  Board Certified Electrodiagnostic Medicine    Administrations This Visit     lidocaine 1 % injection 8 mL     Admin Date  11/16/2020  13:40 Action  Given Dose  8 mL Route  Other Site   Administered By  Mundo Canela MA    Ordering Provider:  Boubacar Solomon DO    NDC:  2251-4871-86    Lot#:  [id number]    :  ROSA    Patient Supplied?:  No    Comments:  EXP:  05/2021

## 2020-11-17 ENCOUNTER — TELEPHONE (OUTPATIENT)
Dept: PHYSICAL MEDICINE AND REHAB | Age: 55
End: 2020-11-17

## 2020-11-30 ENCOUNTER — TELEPHONE (OUTPATIENT)
Dept: PHYSICAL MEDICINE AND REHAB | Age: 55
End: 2020-11-30

## 2020-11-30 NOTE — TELEPHONE ENCOUNTER
Patient called and stated she is getting a bad headache last time she was here you and her talked about her fioricet taking 1 capsule bid prn is it ok for her to take an extra capsule. She just had surgery on her neck on 11-19-20. Please advise.

## 2020-12-01 RX ORDER — BUTALBITAL, ACETAMINOPHEN AND CAFFEINE 300; 40; 50 MG/1; MG/1; MG/1
1 CAPSULE ORAL 2 TIMES DAILY PRN
Qty: 60 CAPSULE | Refills: 2 | Status: SHIPPED
Start: 2020-12-01 | End: 2021-02-16 | Stop reason: SDUPTHER

## 2020-12-01 NOTE — TELEPHONE ENCOUNTER
Called and spoke with the patient but she stated that she needs a new script for Fioricet to state she can take up to 2 capsules a day. Please advise change.

## 2020-12-01 NOTE — TELEPHONE ENCOUNTER
Called and spoke with the patient to inform her that a new script for fioricet 1 cap bid prn was sent to her pharmacy. Patient is aware and voiced understanding.

## 2020-12-28 ENCOUNTER — TELEPHONE (OUTPATIENT)
Dept: PHYSICAL MEDICINE AND REHAB | Age: 55
End: 2020-12-28

## 2020-12-28 NOTE — TELEPHONE ENCOUNTER
Called and spoke with Christen Carter from [address] [facility] to set up delivery for patient Botox 200 units. Botox will be delivered on 12-.

## 2021-01-05 ENCOUNTER — OFFICE VISIT (OUTPATIENT)
Dept: PHYSICAL MEDICINE AND REHAB | Age: 56
End: 2021-01-05
Payer: COMMERCIAL

## 2021-01-05 VITALS
DIASTOLIC BLOOD PRESSURE: 83 MMHG | HEART RATE: 79 BPM | BODY MASS INDEX: 21.9 KG/M2 | SYSTOLIC BLOOD PRESSURE: 128 MMHG | HEIGHT: 62 IN | WEIGHT: 119 LBS

## 2021-01-05 DIAGNOSIS — G43.709 CHRONIC MIGRAINE WITHOUT AURA WITHOUT STATUS MIGRAINOSUS, NOT INTRACTABLE: Primary | ICD-10-CM

## 2021-01-05 PROCEDURE — 20552 NJX 1/MLT TRIGGER POINT 1/2: CPT | Performed by: PHYSICAL MEDICINE & REHABILITATION

## 2021-01-05 PROCEDURE — 64615 CHEMODENERV MUSC MIGRAINE: CPT | Performed by: PHYSICAL MEDICINE & REHABILITATION

## 2021-01-05 RX ORDER — LIDOCAINE HYDROCHLORIDE 10 MG/ML
4 INJECTION, SOLUTION INFILTRATION; PERINEURAL ONCE
Status: COMPLETED | OUTPATIENT
Start: 2021-01-05 | End: 2021-01-06

## 2021-01-05 RX ORDER — CYCLOBENZAPRINE HCL 10 MG
10 TABLET ORAL 2 TIMES DAILY PRN
Qty: 180 TABLET | Refills: 3 | Status: SHIPPED
Start: 2021-01-05 | End: 2021-10-26 | Stop reason: SDUPTHER

## 2021-01-05 NOTE — PROGRESS NOTES
Chepe Naylor D.O. [facility]  [address] [facility] [place]  Phone: [phone]  Fax: [phone]    1/5/2021    Chief Complaint   Patient presents with    Migraine     Botox 200 units       · Informed Consent:  The indications, risks, benefits, and  alternatives of onabotulinum toxin A were discussed with the patient. I explained to the patient the potential side effects including but not limited to: distant spread of toxin effect causing droopy eyelids, facial drooping, neck pain, headache, double vision, muscle pain/spasm/weakness/stiffness,  bronchitis,  injection site pain, increased blood pressure, hypersensitivity, anaphylaxis, difficulty swallowing, difficulty speaking, urinary incontinence and breathing difficulty. The patient is aware that swallowing and breathing difficulties can be life threatening and there have been reports of death in some cases where onabotulinum toxin was injected. The patient was advised of the expected benefit to include less headache days per month, and the anticipated duration of effect of 3 months. A permit was signed and scanned into the media. · Last injection: 10/12/20  · Taking anticoagulants/antiplatelets: No  · Diabetic: No  · Febrile/active infection: No    · Procedure note: After obtaining verbal and written consent from the patient for injection of  onabotulinum toxin A the patient agreed to proceed. 200 units of onabotulinum toxin A was reconstituted using 4 cc of preservative free normal saline ( 5 units per 0.1 ml). The medication was injected using a 30 g 0.5\" needle and a 1 cc syringe. The skin was prepared with Betadine.   Using a no touch technique, the injections were carried out at the following sites: bilateral Temporalis 40 units divided at 8 sites, bilateral Occipitalis 30 units divided at 6 sites, bilateral cervical paraspinals 20 units divided at 4 sites, and bilateral trapezius 30 units range of motion. Noam Seaman D.O., P.T.   Board Certified Physical Medicine and Rehabilitation  Board Certified Electrodiagnostic Medicine

## 2021-01-06 RX ADMIN — LIDOCAINE HYDROCHLORIDE 4 ML: 10 INJECTION, SOLUTION INFILTRATION; PERINEURAL at 08:16

## 2021-02-16 ENCOUNTER — OFFICE VISIT (OUTPATIENT)
Dept: PHYSICAL MEDICINE AND REHAB | Age: 56
End: 2021-02-16
Payer: COMMERCIAL

## 2021-02-16 VITALS
WEIGHT: 114 LBS | HEART RATE: 110 BPM | DIASTOLIC BLOOD PRESSURE: 76 MMHG | SYSTOLIC BLOOD PRESSURE: 134 MMHG | HEIGHT: 62 IN | BODY MASS INDEX: 20.98 KG/M2

## 2021-02-16 DIAGNOSIS — G89.29 CHRONIC NECK PAIN: ICD-10-CM

## 2021-02-16 DIAGNOSIS — M47.812 SPONDYLOSIS OF CERVICAL REGION WITHOUT MYELOPATHY OR RADICULOPATHY: ICD-10-CM

## 2021-02-16 DIAGNOSIS — M54.2 CHRONIC NECK PAIN: ICD-10-CM

## 2021-02-16 DIAGNOSIS — G43.709 CHRONIC MIGRAINE WITHOUT AURA WITHOUT STATUS MIGRAINOSUS, NOT INTRACTABLE: ICD-10-CM

## 2021-02-16 DIAGNOSIS — M79.10 TRIGGER POINT: Primary | ICD-10-CM

## 2021-02-16 PROCEDURE — 20553 NJX 1/MLT TRIGGER POINTS 3/>: CPT | Performed by: PHYSICAL MEDICINE & REHABILITATION

## 2021-02-16 PROCEDURE — 99214 OFFICE O/P EST MOD 30 MIN: CPT | Performed by: PHYSICAL MEDICINE & REHABILITATION

## 2021-02-16 RX ORDER — NYSTATIN 100000 U/G
CREAM TOPICAL
COMMUNITY
Start: 2021-02-01 | End: 2022-06-13 | Stop reason: DRUGHIGH

## 2021-02-16 RX ORDER — HYDROXYZINE HYDROCHLORIDE 25 MG/1
TABLET, FILM COATED ORAL
COMMUNITY
Start: 2021-01-21 | End: 2022-06-13 | Stop reason: ALTCHOICE

## 2021-02-16 RX ORDER — CYCLOBENZAPRINE HCL 10 MG
TABLET ORAL
COMMUNITY
Start: 2020-08-04 | End: 2021-02-16

## 2021-02-16 RX ORDER — AZELASTINE 1 MG/ML
SPRAY, METERED NASAL
COMMUNITY
Start: 2020-08-04 | End: 2022-06-13 | Stop reason: ALTCHOICE

## 2021-02-16 RX ORDER — ZOLMITRIPTAN 5 MG/1
TABLET, FILM COATED ORAL
COMMUNITY
Start: 2021-01-21 | End: 2021-12-01 | Stop reason: SDUPTHER

## 2021-02-16 RX ORDER — BUTALBITAL, ACETAMINOPHEN AND CAFFEINE 300; 40; 50 MG/1; MG/1; MG/1
1 CAPSULE ORAL 2 TIMES DAILY PRN
Qty: 60 CAPSULE | Refills: 2 | Status: SHIPPED
Start: 2021-02-16 | End: 2021-04-07 | Stop reason: SDUPTHER

## 2021-02-16 RX ORDER — LIDOCAINE HYDROCHLORIDE 10 MG/ML
6 INJECTION, SOLUTION INFILTRATION; PERINEURAL ONCE
Status: COMPLETED | OUTPATIENT
Start: 2021-02-16 | End: 2021-02-16

## 2021-02-16 RX ORDER — ACETAMINOPHEN 500 MG
TABLET ORAL
COMMUNITY
Start: 2020-08-05

## 2021-02-16 RX ORDER — GABAPENTIN 300 MG/1
CAPSULE ORAL
COMMUNITY
Start: 2020-08-04 | End: 2021-02-16

## 2021-02-16 RX ORDER — GABAPENTIN 100 MG/1
CAPSULE ORAL
Qty: 90 CAPSULE | Status: CANCELLED | OUTPATIENT
Start: 2021-02-16

## 2021-02-16 RX ORDER — GABAPENTIN 100 MG/1
CAPSULE ORAL
COMMUNITY
Start: 2021-01-18 | End: 2021-08-02 | Stop reason: ALTCHOICE

## 2021-02-16 RX ORDER — OMEPRAZOLE 20 MG/1
CAPSULE, DELAYED RELEASE ORAL
COMMUNITY
Start: 2020-12-30 | End: 2022-06-13 | Stop reason: ALTCHOICE

## 2021-02-16 RX ORDER — PROMETHAZINE HYDROCHLORIDE 12.5 MG/1
12.5 SUPPOSITORY RECTAL EVERY 6 HOURS PRN
Qty: 40 SUPPOSITORY | Refills: 2 | Status: SHIPPED
Start: 2021-02-16 | End: 2022-05-25 | Stop reason: SDUPTHER

## 2021-02-16 RX ORDER — CYPROHEPTADINE HYDROCHLORIDE 4 MG/1
TABLET ORAL
COMMUNITY
Start: 2020-08-04 | End: 2021-02-16

## 2021-02-16 RX ORDER — ONDANSETRON 4 MG/1
4 TABLET, ORALLY DISINTEGRATING ORAL 3 TIMES DAILY PRN
Qty: 21 TABLET | Refills: 0 | Status: SHIPPED
Start: 2021-02-16 | End: 2022-06-13

## 2021-02-16 RX ORDER — GABAPENTIN 100 MG/1
100 CAPSULE ORAL 2 TIMES DAILY
Qty: 60 CAPSULE | Refills: 2 | Status: SHIPPED
Start: 2021-02-16 | End: 2021-06-21 | Stop reason: SDUPTHER

## 2021-02-16 RX ORDER — ZOLMITRIPTAN 5 MG/1
TABLET, FILM COATED ORAL
COMMUNITY
Start: 2020-08-04 | End: 2021-02-16

## 2021-02-16 RX ORDER — TOPIRAMATE 50 MG/1
TABLET, FILM COATED ORAL
COMMUNITY
Start: 2020-08-04 | End: 2021-02-16

## 2021-02-16 RX ADMIN — LIDOCAINE HYDROCHLORIDE 6 ML: 10 INJECTION, SOLUTION INFILTRATION; PERINEURAL at 16:13

## 2021-02-16 NOTE — PROGRESS NOTES
Skinny Richardson D.O. [place] [facility]   [address] [facility] [place]  Phone: [phone]  Fax: [phone]        21    Chief Complaint   Patient presents with    Migraine     6 week follow up botox       HPI:  Daniela Varela is a 48y.o. year old woman seen today in follow up regarding neck pain. Interval history: Since the last visit the patient has been recovering from cervical fusion and feels like her neck pain is significantly better. There have been 5 migraines in the last month. She has been having significant nausea and has difficulty taking oral medications when she gets the nausea. Today, the pain is rated Pain Score:   4 where 0 is no pain and 10 is pain as bad as it can be. The pain is located in the top of the head in a halo pattern and does not radiate and is described as aching, tight. This pain occurs intermittently. The symptoms have been better since onset. Symptoms are exacerbated by working at computer. Factors which relieve the pain include stretching, rest, Botox. Other associated symptoms include paresthesias left arm. Otherwise, the pain assessment has not changed since the last visit.      Past Medical History:   Diagnosis Date    Endometriosis     Headache     Hypertension     Osteoarthritis        Past Surgical History:   Procedure Laterality Date    ABDOMEN SURGERY      X2 Laproscopic for endometriosis    BREAST SURGERY      HYSTERECTOMY, VAGINAL      KNEE SURGERY Bilateral     X5 on each    NECK SURGERY      RHINOPLASTY      TONSILLECTOMY      WRIST GANGLION EXCISION      LEFT       Social History     Tobacco Use    Smoking status: Former Smoker     Packs/day: 0.25     Types: Cigarettes     Quit date: 2020     Years since quittin.4    Smokeless tobacco: Never Used   Substance Use Topics    Alcohol use: Yes     Comment: socially    Drug use: Never       Family History   Problem Relation Age of into the muscle every 30 days      traZODone (DESYREL) 50 MG tablet Take 50 mg by mouth nightly Takes 1.5 tabs at HS      hydrochlorothiazide (MICROZIDE) 12.5 MG capsule Take 12.5 mg by mouth daily.  azelastine (ASTELIN) 0.1 % nasal spray by Nasal route      acetaminophen (TYLENOL) 500 MG tablet Acetaminophen TYLENOL EXTRA STRENGTH 500 MG TABS take as directed on bottle as needed 2020/08/05 ACETAMINOPHEN [id number] Giuseppe Mars MD 08-  Lisa ([id number])      gabapentin (NEURONTIN) 300 MG capsule Take 1 capsule by mouth Daily with lunch AND 2 capsules 2 times daily. Do all this for 30 days. 300 mg in am and at midday, 600 mg qhs. . 450 capsule 3    TURMERIC PO Take by mouth      NONFORMULARY Take 8 mg by mouth 2 times daily CBD oil       No current facility-administered medications for this visit. Allergies   Allergen Reactions    Aimovig [Erenumab-Aooe] Swelling    Avelox [Moxifloxacin]     Charcoal Activated [Activated Charcoal] Hives    Sulfa Antibiotics        Review of Systems:  No new weakness, paresthesia, incontinence of bowel or bladder, saddle anesthesia, falls or gait dysfunction. Otherwise, per HPI. Physical Exam:   Blood pressure 134/76, pulse 110, height 5' 2\" (1.575 m), weight 114 lb (51.7 kg), last menstrual period 04/10/2018. GENERAL: The patient is in no apparent distress. Body habitus is non-obese. HEENT: No rhinorrhea, sneezing, yawning, or lacrimation. No scleral icterus or conjunctival injection. SKIN: No piloerection. No tract marks. No rash. PSYCH: Mood and affect are appropriate. Hygiene is appropriate. CARDIOVASCULAR  Heart is regular rate and rhythm. There is no edema. RESPIRATORY: Respirations are regular and unlabored. There is no cyanosis. GASTROINTESTINAL: Soft abdomen, non-tender. MSK: There is no joint effusion, deformity, instability, swelling, erythema or warmth.   AROM is full in the spine and extremities. Spinal curvatures are normal.   Trigger point present bilateral trapezius and right rhomboid    NEURO: Gait is normal. No focal sensorimotor deficit. Reflexes 2+ and symmetric in lower extremities. Impression:   1. Trigger point    2. Chronic migraine without aura without status migrainosus, not intractable    3. Spondylosis of cervical region without myelopathy or radiculopathy    4. Chronic neck pain        Plan:  Orders Placed This Encounter   Medications    butalbital-APAP-caffeine (FIORICET) -40 MG CAPS per capsule     Sig: Take 1 capsule by mouth 2 times daily as needed for Headaches     Dispense:  60 capsule     Refill:  2    ondansetron (ZOFRAN-ODT) 4 MG disintegrating tablet     Sig: Take 1 tablet by mouth 3 times daily as needed for Nausea or Vomiting     Dispense:  21 tablet     Refill:  0    promethazine (PHENERGAN) 12.5 MG suppository     Sig: Place 1 suppository rectally every 6 hours as needed for Nausea     Dispense:  40 suppository     Refill:  2    gabapentin (NEURONTIN) 100 MG capsule     Sig: Take 1 capsule by mouth 2 times daily for 30 days. Dispense:  60 capsule     Refill:  2    lidocaine 1 % injection 6 mL       Controlled Substance Monitoring:    Acute and Chronic Pain Monitoring:   RX Monitoring 10/8/2019   Periodic Controlled Substance Monitoring No signs of potential drug abuse or diversion identified. Continue home exercises.    Trigger point injections today    Medications Discontinued During This Encounter   Medication Reason    azelastine (ASTELIN) 0.1 % nasal spray     cyclobenzaprine (FLEXERIL) 10 MG tablet LIST CLEANUP    cyproheptadine (PERIACTIN) 4 MG tablet LIST CLEANUP    gabapentin (NEURONTIN) 300 MG capsule LIST CLEANUP    NEURONTIN 400 MG capsule LIST CLEANUP    topiramate (TOPAMAX) 50 MG tablet LIST CLEANUP    ZOLMitriptan (ZOMIG) 5 MG tablet LIST CLEANUP    butalbital-APAP-caffeine (FIORICET) -40 MG CAPS per capsule REORDER     The patient was educated about the diagnosis, prognosis, indications, risks and benefits of treatment. An opportunity to ask questions was given to the patient and questions were answered. The patient agreed to proceed with the recommended treatment as described above. Follow up 6 weeks  Thank you for allowing me to participate in the care of your patient. Piedad Chambers D.O., P.T. Board Certified Physical Medicine and Rehabilitation  Board Certified [facility]. [facility] [address] [facility]. [place] [facility]  [address] [facility] [facility]  Phone: [phone]  Fax: [phone]    2/17/2021    Chief Complaint   Patient presents with    Migraine     6 week follow up botox       Last injection: 11/16/20  Taking anticoagulants/antiplatelets: No  Diabetic: No  Febrile/active infection: No    After explaining the indications, risks, benefits and alternatives of a Bilateral trapezius and right rhomboid trigger point, the patient agreed to proceed. A permit was signed and scanned into the chart. The patient was placed in the seated position. The skin over the trigger point was prepared with alcohol. Using aseptic no touch technique, a 22 gauge, 1 1/2\" needle with 6 cc of Xylocaine 1% was directed into the trigger point. After negative aspiration, 2 cc of the medication was injected in a fanning out method at each of 3 trigger points. Adequate hemostasis was achieved and a bandage applied. The patient tolerated the procedure well and was educated in post injection care. The patient was clinically monitored after the injection and left the office without incident. There was post-injection reduction in pain and improved range of motion. Piedad Chambers D.O., P.T.   Board Certified Physical Medicine and Rehabilitation  Board Certified Electrodiagnostic Medicine    Administrations This Visit     lidocaine 1 % injection 6 mL     Admin

## 2021-02-18 ENCOUNTER — TELEPHONE (OUTPATIENT)
Dept: PHYSICAL MEDICINE AND REHAB | Age: 56
End: 2021-02-18

## 2021-02-18 NOTE — TELEPHONE ENCOUNTER
Called and spoke with Aron Gunn from  [facility] prior authorization department to start authorization for Botox 200 units. [facility] # [id number] [facility]. Faxed clinical information to [phone].

## 2021-02-25 ENCOUNTER — TELEPHONE (OUTPATIENT)
Dept: PHYSICAL MEDICINE AND REHAB | Age: 56
End: 2021-02-25

## 2021-03-08 ENCOUNTER — NURSE ONLY (OUTPATIENT)
Dept: PHYSICAL MEDICINE AND REHAB | Age: 56
End: 2021-03-08
Payer: COMMERCIAL

## 2021-03-08 ENCOUNTER — TELEPHONE (OUTPATIENT)
Dept: ADMINISTRATIVE | Age: 56
End: 2021-03-08

## 2021-03-08 DIAGNOSIS — G43.709 CHRONIC MIGRAINE WITHOUT AURA WITHOUT STATUS MIGRAINOSUS, NOT INTRACTABLE: Primary | ICD-10-CM

## 2021-03-08 PROCEDURE — 99211 OFF/OP EST MAY X REQ PHY/QHP: CPT | Performed by: PHYSICAL MEDICINE & REHABILITATION

## 2021-03-08 PROCEDURE — 96372 THER/PROPH/DIAG INJ SC/IM: CPT | Performed by: PHYSICAL MEDICINE & REHABILITATION

## 2021-03-08 RX ORDER — KETOROLAC TROMETHAMINE 15 MG/ML
15 INJECTION, SOLUTION INTRAMUSCULAR; INTRAVENOUS ONCE
Status: COMPLETED | OUTPATIENT
Start: 2021-03-08 | End: 2021-03-08

## 2021-03-08 RX ADMIN — KETOROLAC TROMETHAMINE 15 MG: 15 INJECTION, SOLUTION INTRAMUSCULAR; INTRAVENOUS at 13:52

## 2021-03-08 NOTE — TELEPHONE ENCOUNTER
Pt is scheduled for Botox on 3/30 [place], but would like a DIFFERENT injection PRIOR to that one for migraines.   She can be reached at [phone]

## 2021-03-08 NOTE — TELEPHONE ENCOUNTER
Called patient and she is having more headaches pain level 7 migraine is gone. Wants to know if she can have a toradol injection. zomig is not helping. Please advise.

## 2021-03-22 NOTE — TELEPHONE ENCOUNTER
Called and spoke with Jameel pharmacist from [address] [place] [address] to call in new script for Botox 200 unit 1 refill. It is to be shipped out on 3-25-21.

## 2021-03-30 ENCOUNTER — OFFICE VISIT (OUTPATIENT)
Dept: PHYSICAL MEDICINE AND REHAB | Age: 56
End: 2021-03-30
Payer: COMMERCIAL

## 2021-03-30 VITALS
BODY MASS INDEX: 21.16 KG/M2 | SYSTOLIC BLOOD PRESSURE: 117 MMHG | WEIGHT: 115 LBS | TEMPERATURE: 97.5 F | DIASTOLIC BLOOD PRESSURE: 82 MMHG | HEART RATE: 87 BPM | HEIGHT: 62 IN

## 2021-03-30 DIAGNOSIS — G43.709 CHRONIC MIGRAINE WITHOUT AURA WITHOUT STATUS MIGRAINOSUS, NOT INTRACTABLE: Primary | ICD-10-CM

## 2021-03-30 PROCEDURE — 64615 CHEMODENERV MUSC MIGRAINE: CPT | Performed by: PHYSICAL MEDICINE & REHABILITATION

## 2021-03-31 NOTE — PROGRESS NOTES
divided at 6 sites. The total was 120 units and as a result there was 45 units of unavoidable waste. Adequate hemostasis was obtained. Ice was applied for 20 minutes post injection. The patient tolerated the procedure well. There were no complications. The patient was educated in post-procedure care including ice 20 minutes on/20 minutes off prn pain/bruising/swelling, advised to avoid hats and rubbing the forehead for 1-2 days and to call if any fevers chills, night sweats, drainage, increased pain, weakness, difficulty breathing or swallowing. Patient advised to expect improvements in about 2 weeks.  follow up 6 weeks      Last Claros D.O., P.T.   Board Certified Physical Medicine and Rehabilitation  Board Certified Electrodiagnostic Medicine

## 2021-04-07 ENCOUNTER — OFFICE VISIT (OUTPATIENT)
Dept: PHYSICAL MEDICINE AND REHAB | Age: 56
End: 2021-04-07
Payer: COMMERCIAL

## 2021-04-07 VITALS
HEART RATE: 90 BPM | DIASTOLIC BLOOD PRESSURE: 76 MMHG | WEIGHT: 114 LBS | BODY MASS INDEX: 20.98 KG/M2 | SYSTOLIC BLOOD PRESSURE: 111 MMHG | HEIGHT: 62 IN | TEMPERATURE: 97.9 F

## 2021-04-07 DIAGNOSIS — G43.709 CHRONIC MIGRAINE WITHOUT AURA WITHOUT STATUS MIGRAINOSUS, NOT INTRACTABLE: ICD-10-CM

## 2021-04-07 PROCEDURE — 20553 NJX 1/MLT TRIGGER POINTS 3/>: CPT | Performed by: PHYSICAL MEDICINE & REHABILITATION

## 2021-04-07 RX ORDER — HYDROXYZINE PAMOATE 25 MG/1
CAPSULE ORAL
COMMUNITY
Start: 2021-04-01 | End: 2021-09-29

## 2021-04-07 RX ORDER — LIDOCAINE HYDROCHLORIDE 10 MG/ML
6 INJECTION, SOLUTION INFILTRATION; PERINEURAL ONCE
Status: COMPLETED | OUTPATIENT
Start: 2021-04-07 | End: 2021-04-08

## 2021-04-07 RX ORDER — TRIAMCINOLONE ACETONIDE 1 MG/G
CREAM TOPICAL
COMMUNITY
Start: 2021-03-30

## 2021-04-07 RX ORDER — BUTALBITAL, ACETAMINOPHEN AND CAFFEINE 300; 40; 50 MG/1; MG/1; MG/1
1 CAPSULE ORAL 2 TIMES DAILY PRN
Qty: 60 CAPSULE | Refills: 2 | Status: SHIPPED
Start: 2021-04-07 | End: 2021-06-23 | Stop reason: SDUPTHER

## 2021-04-08 RX ADMIN — LIDOCAINE HYDROCHLORIDE 6 ML: 10 INJECTION, SOLUTION INFILTRATION; PERINEURAL at 13:37

## 2021-04-15 ENCOUNTER — OFFICE VISIT (OUTPATIENT)
Dept: PHYSICAL MEDICINE AND REHAB | Age: 56
End: 2021-04-15
Payer: COMMERCIAL

## 2021-04-15 VITALS
HEART RATE: 85 BPM | SYSTOLIC BLOOD PRESSURE: 128 MMHG | HEIGHT: 62 IN | BODY MASS INDEX: 20.98 KG/M2 | WEIGHT: 114 LBS | DIASTOLIC BLOOD PRESSURE: 80 MMHG

## 2021-04-15 DIAGNOSIS — M79.10 TRIGGER POINT: Primary | ICD-10-CM

## 2021-04-15 PROCEDURE — 20552 NJX 1/MLT TRIGGER POINT 1/2: CPT | Performed by: PHYSICAL MEDICINE & REHABILITATION

## 2021-04-15 RX ORDER — LIDOCAINE HYDROCHLORIDE 10 MG/ML
2 INJECTION, SOLUTION INFILTRATION; PERINEURAL ONCE
Status: COMPLETED | OUTPATIENT
Start: 2021-04-15 | End: 2021-04-15

## 2021-04-15 RX ADMIN — LIDOCAINE HYDROCHLORIDE 2 ML: 10 INJECTION, SOLUTION INFILTRATION; PERINEURAL at 12:29

## 2021-04-15 NOTE — PATIENT INSTRUCTIONS
Patient Education        Biceps Tendinitis: Exercises  Introduction  Here are some examples of exercises for you to try. The exercises may be suggested for a condition or for rehabilitation. Start each exercise slowly. Ease off the exercises if you start to have pain. You will be told when to start these exercises and which ones will work best for you. How to do the exercises  Biceps stretch   1. Stand and hold your affected arm out to the side, with your hand at about hip level. 2. Gently bend your wrist back so that your fingers point down toward the floor. 3. You may also do this next to a wall and rest your fingers on the wall. 4. For more of a stretch, bend your head to the opposite side of your affected arm. 5. Hold for 15 to 30 seconds. 6. Repeat 2 to 4 times. Resisted supination   For this and the following exercises, you will need elastic exercise material, such as surgical tubing or Thera-Band. 1. Sit leaning forward with your legs slightly spread. Then place your forearm on your thigh with your hand and affected wrist in front of your knee. 2. Grasp one end of an exercise band with your palm down, and step on the other end. 3. Keeping your wrist straight, roll your palm outward and away from your thigh for a count of 2, then slowly move your wrist back to the starting position to a count of 5.  4. Repeat 8 to 12 times. Resisted elbow flexion   1. Using your affected arm, hold one end of an elastic band in your hand. 2. Place the other end of the band under your foot on the same side of your body as your affected arm. 3. Slowly bend your elbow and bring your hand toward your shoulder. Your palm and the underside of your wrist should be facing up as you pull the band toward your shoulder. Count to 2 as you pull up. 4. Relax and slowly return to your starting position. Count to 5 as you return to the start. 5. Repeat 8 to 12 times. Resisted elbow flexion at shoulder level   1.  Tod Cesar the ends of an exercise band together to form a loop. Attach one end of the loop to a secure object or shut a door on it to hold it in place. (Or you can have someone hold one end of the loop to provide resistance.) The band should be at shoulder level. 2. Stand facing where you have tied or fastened the band. 3. Start with your affected arm held out straight, holding the band in your hand. 4. Slowly bend your elbow to 90 degrees, bringing your hand toward your forehead. Count to 2 as you pull the band toward your head. 5. Relax and slowly return to your starting position. Count to 5 as you return to the start. 6. Repeat 8 to 12 times. Follow-up care is a key part of your treatment and safety. Be sure to make and go to all appointments, and call your doctor if you are having problems. It's also a good idea to know your test results and keep a list of the medicines you take. Where can you learn more? Go to https://[employer] [place].[employer]. org and sign in to your [employer] account. Enter N329 in the Amarin box to learn more about \"Biceps Tendinitis: Exercises. \"     If you do not have an account, please click on the \"Sign Up Now\" link. Current as of: November 16, 2020               Content Version: 12.8  © 5084-1593 Healthwise, Incorporated. Care instructions adapted under license by [facility] ([facility]). If you have questions about a medical condition or this instruction, always ask your healthcare professional. [place] [address] any warranty or liability for your use of this information.

## 2021-04-15 NOTE — PROGRESS NOTES
Jus Haque D.O. [place] [facility]  [address] [facility] [place]  Phone: [phone]  Fax: [phone]    4/15/2021    Chief Complaint   Patient presents with    Migraine       Last injection: 4/7/21    Taking anticoagulants/antiplatelets: No  Diabetic: No  Febrile/active infection: No    After explaining the indications, risks, benefits and alternatives of a Right rhomboid trigger point, the patient agreed to proceed. A permit was signed and scanned into the chart. The patient was placed in the  seated position. The skin over the trigger point was prepared with alcohol. Using aseptic no touch technique, a 22 gauge, 1 1/2\" needle with 2 cc of Xylocaine 1% was directed into the trigger point. After negative aspiration, the medication was injected in a fanning out method. Adequate hemostasis was achieved and a bandage applied. The patient tolerated the procedure well and was educated in post injection care. The patient was clinically monitored after the injection and left the office without incident. There was post-injection reduction in pain and improved range of motion. Jus Haque D.O., P.T.   Board Certified Physical Medicine and Rehabilitation  Board Certified Electrodiagnostic Medicine    Administrations This Visit     lidocaine 1 % injection 2 mL     Admin Date  04/15/2021  12:29 Action  Given Dose  2 mL Route  Other Site   Administered By  Shruti Motley MA    Ordering Provider: Oriana Nava DO    NDC: 3809-8297-80    Lot#: [id number]    : Carmen Vargas    Patient Supplied?: No

## 2021-05-11 ENCOUNTER — OFFICE VISIT (OUTPATIENT)
Dept: PHYSICAL MEDICINE AND REHAB | Age: 56
End: 2021-05-11
Payer: COMMERCIAL

## 2021-05-11 VITALS
WEIGHT: 116 LBS | BODY MASS INDEX: 21.35 KG/M2 | SYSTOLIC BLOOD PRESSURE: 120 MMHG | DIASTOLIC BLOOD PRESSURE: 83 MMHG | HEIGHT: 62 IN | HEART RATE: 92 BPM

## 2021-05-11 DIAGNOSIS — G43.709 CHRONIC MIGRAINE WITHOUT AURA WITHOUT STATUS MIGRAINOSUS, NOT INTRACTABLE: ICD-10-CM

## 2021-05-11 DIAGNOSIS — M79.10 TRIGGER POINT: Primary | ICD-10-CM

## 2021-05-11 DIAGNOSIS — M47.812 SPONDYLOSIS OF CERVICAL REGION WITHOUT MYELOPATHY OR RADICULOPATHY: ICD-10-CM

## 2021-05-11 DIAGNOSIS — M54.2 CHRONIC NECK PAIN: ICD-10-CM

## 2021-05-11 DIAGNOSIS — M79.18 MYOFASCIAL PAIN SYNDROME, CERVICAL: ICD-10-CM

## 2021-05-11 DIAGNOSIS — G89.29 CHRONIC NECK PAIN: ICD-10-CM

## 2021-05-11 PROCEDURE — 20553 NJX 1/MLT TRIGGER POINTS 3/>: CPT | Performed by: PHYSICAL MEDICINE & REHABILITATION

## 2021-05-11 PROCEDURE — 99214 OFFICE O/P EST MOD 30 MIN: CPT | Performed by: PHYSICAL MEDICINE & REHABILITATION

## 2021-05-11 NOTE — PROGRESS NOTES
irish Pedroza D.O. [place] [facility]   [address] [facility] [place]  Phone: [phone]  Fax: [phone]        21    Chief Complaint   Patient presents with    Migraine     follow up botox       HPI:  Deanne Mobley is a 48y.o. year old woman seen today in follow up regarding neck pain. Interval history: Since the last visit the patient has been  Well controlled with her headaches with the Botox and current medication regimen. She had no complications from her last Botox injection on 4/15/21. She feels like her trigger points have started to recur in her neck however. Today, the pain is rated Pain Score:   7 where 0 is no pain and 10 is pain as bad as it can be. The pain is located in the top of the head in a halo pattern and does not radiate and is described as aching, tight. This pain occurs intermittently. The symptoms have been better since onset. Symptoms are exacerbated by working at computer. Factors which relieve the pain include stretching, rest, Botox. Other associated symptoms include paresthesias left arm. Otherwise, the pain assessment has not changed since the last visit.      Past Medical History:   Diagnosis Date    Endometriosis     Headache     Hypertension     Osteoarthritis        Past Surgical History:   Procedure Laterality Date    ABDOMEN SURGERY      X2 Laproscopic for endometriosis    BREAST SURGERY      HYSTERECTOMY, VAGINAL      KNEE SURGERY Bilateral     X5 on each    NECK SURGERY      RHINOPLASTY      TONSILLECTOMY      WRIST GANGLION EXCISION      LEFT       Social History     Tobacco Use    Smoking status: Former Smoker     Packs/day: 0.25     Types: Cigarettes     Quit date: 2020     Years since quittin.7    Smokeless tobacco: Never Used   Substance Use Topics    Alcohol use: Yes     Comment: socially    Drug use: Never       Family History   Problem Relation Age of Onset    Osteoporosis JOINT HEALTH ADVANCE) TABS Take 2 tablets by mouth daily      TURMERIC PO Take by mouth      NONFORMULARY Take 8 mg by mouth 2 times daily CBD oil      Onabotulinumtoxin A (BOTOX) 200 units injection Inject 155 Units into the muscle every 30 days      traZODone (DESYREL) 50 MG tablet Take 50 mg by mouth nightly Takes 1.5 tabs at HS      hydrochlorothiazide (MICROZIDE) 12.5 MG capsule Take 12.5 mg by mouth daily.  gabapentin (NEURONTIN) 100 MG capsule Take 1 capsule by mouth 2 times daily for 30 days. 60 capsule 2    gabapentin (NEURONTIN) 300 MG capsule Take 1 capsule by mouth Daily with lunch AND 2 capsules 2 times daily. Do all this for 30 days. 300 mg in am and at midday, 600 mg qhs. . 450 capsule 3     Current Facility-Administered Medications   Medication Dose Route Frequency Provider Last Rate Last Admin    lidocaine 1 % injection 6 mL  6 mL Other Once [employer], DO           Allergies   Allergen Reactions    Aimovig [Erenumab-Aooe] Swelling    Avelox [Moxifloxacin]     Charcoal Activated [Activated Charcoal] Hives    Sulfa Antibiotics        Review of Systems:  No new weakness, paresthesia, incontinence of bowel or bladder, saddle anesthesia, falls or gait dysfunction. Otherwise, per HPI. Physical Exam:   Blood pressure 120/83, pulse 92, height 5' 2\" (1.575 m), weight 116 lb (52.6 kg), last menstrual period 04/10/2018. GENERAL: The patient is in no apparent distress. Body habitus is non-obese. HEENT: No rhinorrhea, sneezing, yawning, or lacrimation. No scleral icterus or conjunctival injection. SKIN: No piloerection. No tract marks. No rash. PSYCH: Mood and affect are appropriate. Hygiene is appropriate. CARDIOVASCULAR  Heart is regular rate and rhythm. There is no edema. RESPIRATORY: Respirations are regular and unlabored. There is no cyanosis. GASTROINTESTINAL: Soft abdomen, non-tender.   MSK: There is no joint effusion, deformity, instability, swelling, erythema or warmth. AROM is full in the spine and extremities. Spinal curvatures are normal.   Trigger point present bilateral trapezius and right rhomboid    NEURO: Gait is normal. No focal sensorimotor deficit. Reflexes 2+ and symmetric in lower extremities. Impression:   1. Trigger point    2. Chronic migraine without aura without status migrainosus, not intractable    3. Spondylosis of cervical region without myelopathy or radiculopathy    4. Chronic neck pain    5. Myofascial pain syndrome, cervical        Plan:  Orders Placed This Encounter   Medications    lidocaine 1 % injection 6 mL     Continue home exercises. Trigger point injections todayThe patient was educated about the diagnosis, prognosis, indications, risks and benefits of treatment. An opportunity to ask questions was given to the patient and questions were answered. The patient agreed to proceed with the recommended treatment as described above. Follow up 6 weeks  Thank you for allowing me to participate in the care of your patient. Jeanie Pedroza D.O., P.T. Board Certified Physical Medicine and Rehabilitation  Board Certified [facility]. [facility] [address] [facility]. [place] [facility]  [address] [facility]. [address] [place] [address]  Phone: [phone]  Fax: [phone]    5/13/2021    Chief Complaint   Patient presents with    Migraine     follow up botox       Last injection: 4/15/21  Taking anticoagulants/antiplatelets: No  Diabetic: No  Febrile/active infection: No    After explaining the indications, risks, benefits and alternatives of a Bilateral trapezius and right rhomboid trigger point, the patient agreed to proceed. A permit was signed and scanned into the chart. The patient was placed in the seated position. The skin over the trigger point was prepared with alcohol.   Using aseptic no touch technique, a 22 gauge, 1 1/2\" needle with 6 cc of Xylocaine 1% was directed into the trigger point.  After negative aspiration, 2 cc of the medication was injected in a fanning out method at each of 3 trigger points. Adequate hemostasis was achieved and a bandage applied. The patient tolerated the procedure well and was educated in post injection care. The patient was clinically monitored after the injection and left the office without incident. There was post-injection reduction in pain and improved range of motion. Jimmie Meade D.O., P.T.   Board Certified Physical Medicine and Rehabilitation  Board Certified Electrodiagnostic Medicine

## 2021-05-13 RX ORDER — LIDOCAINE HYDROCHLORIDE 10 MG/ML
6 INJECTION, SOLUTION INFILTRATION; PERINEURAL ONCE
Status: COMPLETED | OUTPATIENT
Start: 2021-05-13 | End: 2021-05-13

## 2021-05-13 RX ADMIN — LIDOCAINE HYDROCHLORIDE 6 ML: 10 INJECTION, SOLUTION INFILTRATION; PERINEURAL at 14:05

## 2021-06-01 ENCOUNTER — TELEPHONE (OUTPATIENT)
Dept: PHYSICAL MEDICINE AND REHAB | Age: 56
End: 2021-06-01

## 2021-06-01 ENCOUNTER — TELEPHONE (OUTPATIENT)
Dept: ADMINISTRATIVE | Age: 56
End: 2021-06-01

## 2021-06-01 NOTE — TELEPHONE ENCOUNTER
Patient calling in to get another trigger point injections for migraines Do you want me to schedule her?   She was here last may 11 2021

## 2021-06-02 NOTE — TELEPHONE ENCOUNTER
Called and spoke with the patient to inform her that we have no openings until the physician comes back. I will call her next week. Patient is aware and voiced understanding.

## 2021-06-17 ENCOUNTER — TELEPHONE (OUTPATIENT)
Dept: PHYSICAL MEDICINE AND REHAB | Age: 56
End: 2021-06-17

## 2021-06-17 NOTE — TELEPHONE ENCOUNTER
Spoke with the santhosh from [facility] to find out about the Botox stating that [facility] specialty pharmacy is still the providing pharmacy until the end of December. Magalys Dubois then connected me to linh from [facility] specialty pharmacy benefits department and they fixed the issue and the Botox will be delivered on 6-22-21.

## 2021-06-17 NOTE — TELEPHONE ENCOUNTER
Called and spoke with the patient to inform her that we will still keep her appointment for 6-23-21 and if there is any issues I will call her back. Patient is aware and voiced understanding.

## 2021-06-17 NOTE — TELEPHONE ENCOUNTER
Called and spoke with the patient to inform her that I got her Botox fixed and that [facility] specialty pharmacy will be delivering her Botox on 6-22-21, and to call them with a reference # to her paying her co pay so she does not get double billed. Patient is aware and voiced understanding.

## 2021-06-17 NOTE — TELEPHONE ENCOUNTER
Pt called in to schedule an appt for injections. She also has a question regarding her botox injection order. Eldon Black can be reached at [phone]. Thank you.

## 2021-06-21 ENCOUNTER — PATIENT MESSAGE (OUTPATIENT)
Dept: PHYSICAL MEDICINE AND REHAB | Age: 56
End: 2021-06-21

## 2021-06-21 RX ORDER — GABAPENTIN 100 MG/1
100 CAPSULE ORAL 2 TIMES DAILY
Qty: 60 CAPSULE | Refills: 2 | Status: SHIPPED
Start: 2021-06-21 | End: 2021-08-09

## 2021-06-21 NOTE — TELEPHONE ENCOUNTER
Thank you for using [employer]. We have received your message and are currently  addressing your request.  We will get back to you as soon as possible.  Thank you.   medication is keyed up

## 2021-06-21 NOTE — TELEPHONE ENCOUNTER
From: Ned Fuentes  To: Nicole Maynard DO  Sent: 6/21/2021 8:37 AM EDT  Subject: Prescription Question    Good morning Dr. Billy Davis and John Askew! Happy Monday:)    I know that I see you tomorrow, but I just took my last gabapentin. I am still at the 100 mg twice a day. Whenever I try to go down to once a day, some slight shocks come back. Is it possible to have a [facility] of [facility] [place]? Im sorry to ask.      Thank you,    Erica Benavides

## 2021-06-23 ENCOUNTER — OFFICE VISIT (OUTPATIENT)
Dept: PHYSICAL MEDICINE AND REHAB | Age: 56
End: 2021-06-23
Payer: COMMERCIAL

## 2021-06-23 VITALS
WEIGHT: 113 LBS | SYSTOLIC BLOOD PRESSURE: 139 MMHG | HEART RATE: 76 BPM | DIASTOLIC BLOOD PRESSURE: 86 MMHG | HEIGHT: 62 IN | BODY MASS INDEX: 20.8 KG/M2 | TEMPERATURE: 97.1 F

## 2021-06-23 DIAGNOSIS — G43.709 CHRONIC MIGRAINE WITHOUT AURA WITHOUT STATUS MIGRAINOSUS, NOT INTRACTABLE: ICD-10-CM

## 2021-06-23 PROCEDURE — 64615 CHEMODENERV MUSC MIGRAINE: CPT | Performed by: PHYSICAL MEDICINE & REHABILITATION

## 2021-06-23 RX ORDER — BUTALBITAL, ACETAMINOPHEN AND CAFFEINE 300; 40; 50 MG/1; MG/1; MG/1
1 CAPSULE ORAL 2 TIMES DAILY PRN
Qty: 60 CAPSULE | Refills: 2 | Status: SHIPPED
Start: 2021-06-23 | End: 2021-09-03 | Stop reason: SDUPTHER

## 2021-06-23 NOTE — PROGRESS NOTES
Jasmin Gallegos D.O. [facility]  [address] [facility] [place]  Phone: [phone]  Fax: [phone]    6/23/2021    Chief Complaint   Patient presents with    Migraine     botox 200 units        · Informed Consent:  The indications, risks, benefits, and  alternatives of onabotulinum toxin A were discussed with the patient. I explained to the patient the potential side effects including but not limited to: distant spread of toxin effect causing droopy eyelids, facial drooping, neck pain, headache, double vision, muscle pain/spasm/weakness/stiffness,  bronchitis,  injection site pain, increased blood pressure, hypersensitivity, anaphylaxis, difficulty swallowing, difficulty speaking, urinary incontinence and breathing difficulty. The patient is aware that swallowing and breathing difficulties can be life threatening and there have been reports of death in some cases where onabotulinum toxin was injected. The patient was advised of the expected benefit to include less headache days per month, and the anticipated duration of effect of 3 months. A permit was signed and scanned into the media. · Last injection:10/12/20  · Taking anticoagulants/antiplatelets: No  · Diabetic: No  · Febrile/active infection: No    · Procedure note: After obtaining verbal and written consent from the patient for injection of  onabotulinum toxin A the patient agreed to proceed. 200 units of onabotulinum toxin A was reconstituted using 4 cc of preservative free normal saline ( 5 units per 0.1 ml). The medication was injected using a 30 g 0.5\" needle and a 1 cc syringe. The skin was prepared with Betadine.   Using a no touch technique, the injections were carried out at the following sites: bilateral Temporalis 40 units divided at 8 sites, bilateral Occipitalis 30 units divided at 6 sites, bilateral cervical paraspinals 20 units divided at 4 sites, and bilateral trapezius 30 units divided at 6 sites, right rhomboid 5 units. The total was 125 units and as a result there was 75 units of unavoidable waste. Adequate hemostasis was obtained. Ice was applied for 20 minutes post injection. The patient tolerated the procedure well. There were no complications. The patient was educated in post-procedure care including ice 20 minutes on/20 minutes off prn pain/bruising/swelling, advised to avoid hats and rubbing the forehead for 1-2 days and to call if any fevers chills, night sweats, drainage, increased pain, weakness, difficulty breathing or swallowing. Patient advised to expect improvements in about 2 weeks.  follow up 6 weeks      Fermin Kang D.O., P.T.   Board Certified Physical Medicine and Rehabilitation  Board Certified Electrodiagnostic Medicine    Administrations This Visit     Onabotulinumtoxin A (BOTOX (COSMETIC)) injection 200 Units     Admin Date  06/23/2021  09:50 Action  Given Dose  200 Units Route  Intramuscular Site  Other Administered By  Wiley Rodriguez MA    Ordering Provider: DO Anayeli Nash [address]: [id number]    Lot#: [id number]    : Fredis Armor    Patient Supplied?: Yes

## 2021-07-06 NOTE — PROGRESS NOTES
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    Assessment of current deficits   [] Functional mobility   [x] ADLs  [x] Strength   [] Cognition   [] Functional transfers   [x] IADLs  [] Safety Awareness  [] Endurance   [] Fine Motor Coordination  [] Balance  [] Vision/perception  [] Sensation    [] Gross Motor Coordination [x] ROM  [x] Pain   [] Edema    [x] Scar Adhesion/Skin Integrity     OT PLAN OF CARE   OT POC based on physician orders, patient diagnosis and results of clinical assessment    Frequency/Duration1-2x/ week x 6 weeks  Specific OT Treatment to include:     [x] Instruction in HEP                   Modalities:  [x] Therapeutic Exercise        [x] Ultrasound               [] Electrical Stimulation/Attended  [x] PROM/Stretching                    [x] Fluidotherapy          [x]  Paraffin                   [x] AAROM  [x] AROM                 [] Iontophoresis:   [] Tendon Glides                                               [] Neuromuscular Re-Ed            [] ADL/IADL re-training    [x] Therapeutic Activity       [x] Pain Management with/without modalities PRN                 [x] Manual Therapy                      [x] Splinting [x] Scar Management                   []Joint Protection/Training  []Ergonomics                             [] Joint Mobilization        [] Adaptive Equipment Assessment/Training                             [] Manual Edema Mobilization   [] Myofascial Release                 [] Energy Conservation/Work Simplification  [x] GM/FM Coordination       [] Safety retraining/education per  individual diagnosis/goals  [] Desensitization       Patient Specific Goal: \" Use my thumb better with no pain\". GOALS (Long term same as Short term):  1) Patient will demonstrate good understanding of home program (exercises/activities/diagnosis/prognosis/goals) with good accuracy. 2) Patient will demonstrate increased active/passive range of motion of their left thumb to [facility] for ADL/IADL completion. 3) Patient will demonstrate /pinch strength of at least 30# / 5# pinch of their Left hand. 4) Patient to report decreased pain in their affected lefft hand/ wrist/ upper extremity from 4/10 with light movement to 2/10 or less with resistive functional use. 5) Increase in fine motor function as evidenced by decreased time to complete 9-hole peg test and/or MRMT test by at least 5-10 seconds. 6) Patient to report 100% compliance with their splint wear, care, and precautions if needed. 7) Patient to demonstrate decreased guarding of their affected left thumb/ hand from 90% to 20% or less. 7) Patient will demonstrate a non-tender/non-adherent scar. 8) Patient will report ADL/ IADL functions as Mod I/I using the affected left thumb/ hand. 9) Patient will decrease QuickDASH score to 30% or less for increased participation in daily functional activities.      Past Medical History:   Past Medical History:   Diagnosis Date    Endometriosis     Headache     Hypertension     Osteoarthritis      Past Surgical History:   Past Surgical History:   Procedure Laterality Date    ABDOMEN SURGERY X2 Laproscopic for endometriosis    BREAST SURGERY      HYSTERECTOMY, VAGINAL      KNEE SURGERY Bilateral     X5 on each    NECK SURGERY      RHINOPLASTY      TONSILLECTOMY      WRIST GANGLION EXCISION      LEFT       Reason for Referral: Pt presents with a Hx of persistent left thumb pain and instability. Pt was Dx with CMC arthritis and required a arthroplasty on 6-8-21. She was casted after surgery and presents today following removal. Her main complaint today is of thumb discomfort and decreased ease with daily activity. Minimal swelling is present in the radial hand/ thumb but the incision is tight/ tender to palpation. Prior Level of Function: independent    Cognition:   Alert/Oriented x3     IADL STATUS:   Ind Mod I Min A Mod A Max A Dep Other   Homemaking Responsibility    x      Shopping Responsibility:    x      Mode of Transportation:  x        Leisure & Hobbies: exercise      x    Work: teacher       Off for summer       ADL STATUS:   Ind Mod I Min A Mod A Max A Dep Other   Feeding:   x       Grooming:  x        Bathing:  x        UE Dressing:  x        LE Dressing:  x        Toileting:  x        Transfers: x           Comments: All self care is completed with over use of the right arm and minimal use of the non-involved portions of the left hand. Pt reports all tasks require increased time and effort to complete.      Pain Level: 4 on scale of 1-10, sore, tender, burning, tight (pulling) and uncomfortable  In the left thumb    UE Assessment: RHD    Left UE AROM: Exceptions to WNL  Wrist flexion 0-52  Wrist ext 0-38  RD 0-12  UD 0-28    L thumb radial ext 0-55  L thumb palmar ABD 0-46    Sensation: L hand  Able To Sense (Y) / Unable to Sense (N)  SEMMES-NAVIN Thumb 2nd Digit 3rd Digit  4th Digit  5th Digit    Normal Touch  Size: 2.83   x x x   Diminished Light Touch   Size: 3.61 x x      Diminished Protective Sense  Size: 4.31        Loss of Protective Sense   Size: 4.56        Loss of Sensation  Size: 6.65        Comments: Tingling is reported in the thumb and index fingers. Pt is currently on neurontin d/t nerve issues coming from her cervical issues. Edema Description/Circumferential Measurements: Swelling in the left hand is very minimal/ insignificant     Dynamometer (setting 2): TBA as permitted by protocol         Pinch Meter: TBA     9 Hole Peg Test:   Left: 37.1 sec (cc: awkward)     QuickDASH Score: 69.9% disability    Intervention: Pt presents after cast removal before today's session. A custom thumb spica splint was fabricated . Pt tolerated splinting well and was provided with stockinette for added comfort under the splint. The splint it to be work at all times except hygiene, massage and ROM ex. Care of the splint and precautions discussed. Pt verbalized understanding. Home programming started to gentle scar massage and thumb AROM ex. Exercises included include thumb radial extension, palmar ABD/ADD, IP flexion/ ext and opposition. ROM exercises are to start with 2x/ er day and 20 reps (except for 10 reps opposition). Pt is reminded to move slowly and to avoid straining. Will continue with POC as tolerated. (Ortho fit- 30, Therapeutic activity 20 min)       Eval Complexity: Low  Profile and History- interview, no other records available  Assessment of Occupational Performance and Identification of Deficits- 6 performance deficits   Clinical Decision Making- modifications in testing/ co-morbidity -arthritis    Rehab Potential:                                 [x] Good  [] Fair  [] Poor        Suggested Professional Referral:       [x] No  [] Yes:  Barriers to Goal Achievement[de-identified]          [x] No  [] Yes:  Domestic Concerns:                           [x] No  [] Yes:       Patient. Education:  [x] Plans/Goals, Risks/Benefits discussed  [x] Home exercise program  Method of Education: [x] Verbal  [x] Demo  [] Written  Comprehension of Education:  [x] Verbalizes understanding.   [x] Demonstrates understanding. [] Needs Review. [] Demonstrates/verbalizes understanding of HEP/Ed previously given. Patient understands diagnosis/prognosis and consents to treatment, plan and goals:   [x] Yes    [] No     Goal Formulation: Patient  Time In: 1240            Time Out: 1355                      Timed Code Treatment Minutes: 75 minutes      CODE  Minutes  Units   14504 OT Eval Low 25 1   33790 OT Eval Medium     71549 OT Eval High     83264 Fluidotherapy     05921 Manual     62535 Therapeutic Ex     91525 Therapeutic Activity 20 1   75891 ADL/COMP Tech Train     [id number] Neuromuscular Re-Ed     83888 OrthoManagementTraining 30 2   41367 Paraffin     26147 Electrical Stim - Attended     [id number] Iontophoresis     15957 Ultrasound      Other                Electronically signed by: Clemente Melgoza OT /WILLY  [id number]     [id number] Certification / Comments      Frequency/Duration 1-2x / week for up to 12 visits. Certification period From: 7-7-21  To: 10-7-21     I have reviewed the Plan of Care established for skilled therapy services and certify that the services are required and that they will be provided while the patient is under my care. Physician's Comments/Revisions:           Physicians's Printed Name:  Dr Kelly Wade                                 Physician's Signature:                                                               Date:      Please review Patient's OT evaluation and if you agree sign/date and fax back to us at our [facility] Fax: [phone].  Thank you for your referral!

## 2021-07-07 ENCOUNTER — EVALUATION (OUTPATIENT)
Dept: OCCUPATIONAL THERAPY | Age: 56
End: 2021-07-07
Payer: COMMERCIAL

## 2021-07-07 DIAGNOSIS — M18.12 ARTHRITIS OF CARPOMETACARPAL (CMC) JOINT OF LEFT THUMB: Primary | ICD-10-CM

## 2021-07-07 PROCEDURE — 97165 OT EVAL LOW COMPLEX 30 MIN: CPT | Performed by: OCCUPATIONAL THERAPIST

## 2021-07-07 PROCEDURE — 97530 THERAPEUTIC ACTIVITIES: CPT | Performed by: OCCUPATIONAL THERAPIST

## 2021-07-07 PROCEDURE — 97760 ORTHOTIC MGMT&TRAING 1ST ENC: CPT | Performed by: OCCUPATIONAL THERAPIST

## 2021-07-12 RX ORDER — ONDANSETRON 4 MG/1
TABLET, ORALLY DISINTEGRATING ORAL
Qty: 18 TABLET | Refills: 1 | OUTPATIENT
Start: 2021-07-12

## 2021-07-15 ENCOUNTER — TREATMENT (OUTPATIENT)
Dept: OCCUPATIONAL THERAPY | Age: 56
End: 2021-07-15
Payer: COMMERCIAL

## 2021-07-15 DIAGNOSIS — M18.12 ARTHRITIS OF CARPOMETACARPAL (CMC) JOINT OF LEFT THUMB: Primary | ICD-10-CM

## 2021-07-15 PROCEDURE — 97018 PARAFFIN BATH THERAPY: CPT | Performed by: OCCUPATIONAL THERAPIST

## 2021-07-15 PROCEDURE — 97530 THERAPEUTIC ACTIVITIES: CPT | Performed by: OCCUPATIONAL THERAPIST

## 2021-07-15 PROCEDURE — 97110 THERAPEUTIC EXERCISES: CPT | Performed by: OCCUPATIONAL THERAPIST

## 2021-07-15 PROCEDURE — 97760 ORTHOTIC MGMT&TRAING 1ST ENC: CPT | Performed by: OCCUPATIONAL THERAPIST

## 2021-07-15 NOTE — PROGRESS NOTES
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     Assessment of current deficits   []? Functional mobility             [x]? ADLs          [x]? Strength                  []? Cognition   []? Functional transfers           [x]? IADLs         []? Safety Awareness  []? Endurance   []? Fine Motor Coordination    []? Balance      []? Vision/perception   []? Sensation     []? Gross Motor Coordination [x]? ROM           [x]? Pain                        []? Edema          [x]? Scar Adhesion/Skin Integrity      OT PLAN OF CARE   OT POC based on physician orders, patient diagnosis and results of clinical assessment     Frequency/Duration1-2x/ week x 6 weeks  Specific OT Treatment to include:      [x]? Instruction in HEP                   Modalities:  [x]?  Therapeutic Exercise                 [x]? Ultrasound               []? Electrical Stimulation/Attended  [x]? PROM/Stretching                    [x]? Fluidotherapy          [x]?   Paraffin                   [x]? AAROM  [x]?  AROM                 []? Iontophoresis:   []? Tendon [place]  []? Neuromuscular Re-Ed            []? ADL/IADL re-training    [x]?  Therapeutic Activity                  [x]? Pain Management with/without modalities PRN                 [x]?  Manual Therapy                      [x]? Splinting                                   [x]? Scar Management                   []?Joint Protection/Training  []? Ergonomics                             []? Joint Mobilization                      []? Adaptive Equipment Assessment/Training                             []? Manual Edema Mobilization   []? Myofascial Release                 []? Energy Conservation/Work Simplification  [x]? GM/FM Coordination                []? Safety retraining/education per  individual diagnosis/goals  []? Desensitization        Patient Specific Goal: \" Use my thumb better with no pain\".                            GJTZP (Long term same as Short term):  1) Patient will demonstrate good understanding of home program (exercises/activities/diagnosis/prognosis/goals) with good accuracy. 2) Patient will demonstrate increased active/passive range of motion of their left thumb to [facility] for ADL/IADL completion. 3) Patient will demonstrate /pinch strength of at least 30# / 5# pinch of their Left hand. 4) Patient to report decreased pain in their affected lefft hand/ wrist/ upper extremity from 4/10 with light movement to 2/10 or less with resistive functional use. 5) Increase in fine motor function as evidenced by decreased time to complete 9-hole peg test and/or MRMT test by at least 5-10 seconds. 6) Patient to report 100% compliance with their splint wear, care, and precautions if needed. 7) Patient to demonstrate decreased guarding of their affected left thumb/ hand from 90% to 20% or less. 7) Patient will demonstrate a non-tender/non-adherent scar. 8) Patient will report ADL/ IADL functions as Mod I/I using the affected left thumb/ hand.    9) Patient will decrease QuickDASH score to 30% or less for increased participation in daily functional activities.        Pain Level: No pain reported    Subjective: \" My thumb is a little

## 2021-07-21 ENCOUNTER — OFFICE VISIT (OUTPATIENT)
Dept: PHYSICAL MEDICINE AND REHAB | Age: 56
End: 2021-07-21
Payer: COMMERCIAL

## 2021-07-21 ENCOUNTER — TREATMENT (OUTPATIENT)
Dept: OCCUPATIONAL THERAPY | Age: 56
End: 2021-07-21
Payer: COMMERCIAL

## 2021-07-21 VITALS
BODY MASS INDEX: 21.16 KG/M2 | HEIGHT: 62 IN | TEMPERATURE: 97.2 F | WEIGHT: 115 LBS | HEART RATE: 91 BPM | SYSTOLIC BLOOD PRESSURE: 120 MMHG | DIASTOLIC BLOOD PRESSURE: 78 MMHG

## 2021-07-21 DIAGNOSIS — M18.12 ARTHRITIS OF CARPOMETACARPAL (CMC) JOINT OF LEFT THUMB: Primary | ICD-10-CM

## 2021-07-21 DIAGNOSIS — M54.2 TRIGGER POINT OF NECK: Primary | ICD-10-CM

## 2021-07-21 PROCEDURE — 97530 THERAPEUTIC ACTIVITIES: CPT | Performed by: OCCUPATIONAL THERAPIST

## 2021-07-21 PROCEDURE — 20553 NJX 1/MLT TRIGGER POINTS 3/>: CPT | Performed by: PHYSICAL MEDICINE & REHABILITATION

## 2021-07-21 PROCEDURE — 97018 PARAFFIN BATH THERAPY: CPT | Performed by: OCCUPATIONAL THERAPIST

## 2021-07-21 PROCEDURE — 97140 MANUAL THERAPY 1/> REGIONS: CPT | Performed by: OCCUPATIONAL THERAPIST

## 2021-07-21 PROCEDURE — 97110 THERAPEUTIC EXERCISES: CPT | Performed by: OCCUPATIONAL THERAPIST

## 2021-07-21 RX ORDER — LIDOCAINE HYDROCHLORIDE 10 MG/ML
10 INJECTION, SOLUTION INFILTRATION; PERINEURAL ONCE
Status: COMPLETED | OUTPATIENT
Start: 2021-07-21 | End: 2021-07-21

## 2021-07-21 RX ADMIN — LIDOCAINE HYDROCHLORIDE 10 ML: 10 INJECTION, SOLUTION INFILTRATION; PERINEURAL at 16:00

## 2021-07-21 NOTE — PROGRESS NOTES
Casey Ruiz D.O. [facility]  [address] [facility] [place]  Phone: [phone]  Fax: [phone]    7/22/2021    Chief Complaint   Patient presents with    Neck Pain     TRIGGER POINT INJECTION       Last injection: 5/11/21  Taking anticoagulants/antiplatelets: No  Diabetic: No  Febrile/active infection: No    After explaining the indications, risks, benefits and alternatives of a Bilateral trapezius, SCM and right rhomboid trigger points X5, the patient agreed to proceed. A permit was signed and scanned into the chart. The patient was placed in the  seated position. The skin over the trigger point was prepared with alcohol. Using aseptic no touch technique, a 22 gauge, 1 1/2\" needle with 10 cc of Xylocaine 1% was directed into the trigger point. After negative aspiration, 2 cc of the medication was injected in a fanning out method at each of 5 trigger points. Adequate hemostasis was achieved and a bandage applied. The patient tolerated the procedure well and was educated in post injection care. The patient was clinically monitored after the injection and left the office without incident. There was post-injection reduction in pain and improved range of motion. Casey Ruiz D.O., P.T.   Board Certified Physical Medicine and Rehabilitation  Board Certified Electrodiagnostic Medicine    Administrations This Visit     lidocaine 1 % injection 10 mL     Admin Date  07/21/2021  16:00 Action  Given Dose  10 mL Route  Other Site   Administered By  Blaine Barrett MA    Ordering Provider: Laura Cool DO    NDC: 1523-9152-32    Lot#: [id number]    : Liliana Martinez    Patient Supplied?: No

## 2021-07-21 NOTE — PROGRESS NOTES
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     Assessment of current deficits   []? Functional mobility             [x]? ADLs          [x]? Strength                  []? Cognition   []? Functional transfers           [x]? IADLs         []? Safety Awareness  []? Endurance   []? Fine Motor Coordination    []? Balance      []? Vision/perception   []? Sensation     []? Gross Motor Coordination [x]? ROM           [x]? Pain                        []? Edema          [x]? Scar Adhesion/Skin Integrity      OT PLAN OF CARE   OT POC based on physician orders, patient diagnosis and results of clinical assessment     Frequency/Duration1-2x/ week x 6 weeks  Specific OT Treatment to include:      [x]? Instruction in HEP                   Modalities:  [x]?  Therapeutic Exercise                 [x]? Ultrasound               []? Electrical Stimulation/Attended  [x]? PROM/Stretching                    [x]? Fluidotherapy          [x]?   Paraffin                   [x]? AAROM  [x]?  AROM                 []? Iontophoresis:   []? Tendon Ceclia Draft  []? Neuromuscular Re-Ed            []? ADL/IADL re-training    [x]?  Therapeutic Activity                  [x]? Pain Management with/without modalities PRN                 [x]?  Manual Therapy                      [x]? Splinting                                   [x]? Scar Management                   []?Joint Protection/Training  []? Ergonomics                             []? Joint Mobilization                      []? Adaptive Equipment Assessment/Training                             []? Manual Edema Mobilization   []? Myofascial Release                 []? Energy Conservation/Work Simplification  [x]? GM/FM Coordination                []? Safety retraining/education per  individual diagnosis/goals  []? Desensitization        Patient Specific Goal: \" Use my thumb better with no pain\".                            INMKV (Long term same as Short term):  1) Patient will demonstrate good understanding of home program (exercises/activities/diagnosis/prognosis/goals) with good accuracy. 2) Patient will demonstrate increased active/passive range of motion of their left thumb to [facility] for ADL/IADL completion. 3) Patient will demonstrate /pinch strength of at least 30# / 5# pinch of their Left hand. 4) Patient to report decreased pain in their affected lefft hand/ wrist/ upper extremity from 4/10 with light movement to 2/10 or less with resistive functional use. 5) Increase in fine motor function as evidenced by decreased time to complete 9-hole peg test and/or MRMT test by at least 5-10 seconds. 6) Patient to report 100% compliance with their splint wear, care, and precautions if needed. 7) Patient to demonstrate decreased guarding of their affected left thumb/ hand from 90% to 20% or less. 7) Patient will demonstrate a non-tender/non-adherent scar. 8) Patient will report ADL/ IADL functions as Mod I/I using the affected left thumb/ hand.    9) Patient will decrease QuickDASH score to 30% or less for increased participation in daily functional activities.        Pain Level: No pain reported    Subjective: \" My thumb is \"     Objective:  Updated POC to be completed by 8-7-21. INTERVENTION: COMPLETED: SPECIFICS/COMMENTS:   Modality:     Paraffin Tx- L hand/ wrist x 10 min to decrease stiffness and to promote soft tissue elasticity        AROM:     Thumb AROM X  X  x - all planes- 10x each  - thumb slides 5x each digit (NEW)  - exercise spheres  - in hand manipulation with marbles (4)- NEW   Wrist AROM- all planes X  x - all planes/ 10x each  -Juxaciser 5x             PROM/Stretching:               Scar Mass/Edema Control:     Scar massage x    Soft tissue mobilization x    Strengthening:               Other:     Splint modifications x Blue foam added over the radial wrist portion of the splint/ pt states it is helpful and feels more comfortable   HEP x Thumb AROM all planes, wrist AROM all planes, in hand manipulation w/ golf balls     Assessment/Comments: Pt is making Good progress toward stated plan of care. Tx completed with a focus on thumb/ wrist ROM and exercises to improve thumb dexterity. Some soreness is noted in the web space with the new exercises. Good progress continues in ROM, and functional light thumb use. When it is approved, will transition pt to a comfort cool thumb splint.      -Rehab Potential: Good  -Requires OT Follow Up: Yes  Time In:1310            Time Out: 1400             Visit #: 3    Treatment Charges: Mins Units   Modalities: Paraffin 10 1   Ther Exercise 15 1   Manual Therapy 10 1   Thera Activities 15 1   ADL/Home Mgt      Neuro Re-education     Gait Training     Group Therapy     Non-Billable Service Time     Other: ortho fit     Total Time/Units 50 4       -Response to Treatment: Pt is happy with her progress. Goals: Goals for pt can be seen on initial eval occurring on 7-7-21    Plan:   [x]  Continue Plan of care: Continue ROM and progressive functional thumb activities as tolerated. Start strengthening at 8 weeks post op. Treatment covered based on POC and graduated to patient's progress. Pt education continues at each visit to obtain maximum benefits from skilled OT intervention.   []  Alter Plan of care:   []  Discharge:      Nate Perkins OT /L  [id number]

## 2021-08-02 ENCOUNTER — OFFICE VISIT (OUTPATIENT)
Dept: PHYSICAL MEDICINE AND REHAB | Age: 56
End: 2021-08-02
Payer: COMMERCIAL

## 2021-08-02 VITALS
HEIGHT: 62 IN | BODY MASS INDEX: 20.98 KG/M2 | SYSTOLIC BLOOD PRESSURE: 120 MMHG | WEIGHT: 114 LBS | HEART RATE: 96 BPM | DIASTOLIC BLOOD PRESSURE: 83 MMHG

## 2021-08-02 DIAGNOSIS — G43.709 CHRONIC MIGRAINE WITHOUT AURA WITHOUT STATUS MIGRAINOSUS, NOT INTRACTABLE: Primary | ICD-10-CM

## 2021-08-02 DIAGNOSIS — M79.18 MYOFASCIAL PAIN SYNDROME, CERVICAL: ICD-10-CM

## 2021-08-02 DIAGNOSIS — M54.81 BILATERAL OCCIPITAL NEURALGIA: ICD-10-CM

## 2021-08-02 DIAGNOSIS — M47.812 SPONDYLOSIS OF CERVICAL REGION WITHOUT MYELOPATHY OR RADICULOPATHY: ICD-10-CM

## 2021-08-02 PROCEDURE — 96372 THER/PROPH/DIAG INJ SC/IM: CPT | Performed by: PHYSICAL MEDICINE & REHABILITATION

## 2021-08-02 PROCEDURE — 99213 OFFICE O/P EST LOW 20 MIN: CPT | Performed by: PHYSICAL MEDICINE & REHABILITATION

## 2021-08-02 RX ORDER — PENICILLIN V POTASSIUM 500 MG/1
TABLET ORAL
COMMUNITY
Start: 2021-07-28 | End: 2021-09-29 | Stop reason: ALTCHOICE

## 2021-08-02 RX ORDER — KETOROLAC TROMETHAMINE 15 MG/ML
15 INJECTION, SOLUTION INTRAMUSCULAR; INTRAVENOUS ONCE
Status: COMPLETED | OUTPATIENT
Start: 2021-08-02 | End: 2021-08-02

## 2021-08-02 RX ORDER — KETOROLAC TROMETHAMINE 10 MG/1
10 TABLET, FILM COATED ORAL EVERY 6 HOURS PRN
Qty: 12 TABLET | Refills: 0 | Status: SHIPPED
Start: 2021-08-02 | End: 2021-09-29 | Stop reason: ALTCHOICE

## 2021-08-02 RX ORDER — ESTRADIOL 0.1 MG/G
CREAM VAGINAL
COMMUNITY
Start: 2021-07-27 | End: 2022-06-13 | Stop reason: ALTCHOICE

## 2021-08-02 RX ORDER — BUPIVACAINE HYDROCHLORIDE 2.5 MG/ML
4 INJECTION, SOLUTION INFILTRATION; PERINEURAL ONCE
Status: COMPLETED | OUTPATIENT
Start: 2021-08-02 | End: 2021-08-02

## 2021-08-02 RX ORDER — METHYLPREDNISOLONE 4 MG/1
TABLET ORAL
COMMUNITY
Start: 2021-07-28 | End: 2021-09-29 | Stop reason: ALTCHOICE

## 2021-08-02 RX ADMIN — BUPIVACAINE HYDROCHLORIDE 10 MG: 2.5 INJECTION, SOLUTION INFILTRATION; PERINEURAL at 16:16

## 2021-08-02 RX ADMIN — KETOROLAC TROMETHAMINE 15 MG: 15 INJECTION, SOLUTION INTRAMUSCULAR; INTRAVENOUS at 10:52

## 2021-08-02 NOTE — PROGRESS NOTES
Shae Baker D.O. [facility]   [facility]. [facility]  Phone: [phone]  Fax: [phone]        21    Chief Complaint   Patient presents with    Migraine     follow up botox migraine       HPI:  Shaq Jones is a 48y.o. year old woman seen today in follow up regarding neck pain. Interval history: Since the last visit the patient has been well controlled with her headaches with the Botox and current medication regimen. She had no complications from her last Botox injection on 21. Today, the pain is rated Pain Score:   7 where 0 is no pain and 10 is pain as bad as it can be. The pain is located occipital region and does radiate to the bifrontal region and is described as aching, tight. This pain occurs intermittently. The symptoms have been better since onset. Symptoms are exacerbated by working at computer. Factors which relieve the pain include stretching, rest, Botox. Other associated symptoms include paresthesias left arm. Otherwise, the pain assessment has not changed since the last visit. HIT 6 was 58.     Past Medical History:   Diagnosis Date    Endometriosis     Headache     Hypertension     Osteoarthritis      Past Surgical History:   Procedure Laterality Date    ABDOMEN SURGERY      X2 Laproscopic for endometriosis    BREAST SURGERY      HYSTERECTOMY, VAGINAL      KNEE SURGERY Bilateral     X5 on each    NECK SURGERY      RHINOPLASTY      TONSILLECTOMY      WRIST GANGLION EXCISION      LEFT     Social History     Tobacco Use    Smoking status: Former Smoker     Packs/day: 0.25     Types: Cigarettes     Quit date: 2020     Years since quittin.9    Smokeless tobacco: Never Used   Vaping Use    Vaping Use: Never used   Substance Use Topics    Alcohol use: Yes     Comment: socially    Drug use: Never     Family History   Problem Relation Age of Onset    Osteoporosis Mother     Hypertension Father mouth daily as needed for Headaches 7 capsule 0    cyproheptadine (PERIACTIN) 4 MG tablet Take 4 mg by mouth 2 times daily      Glucos-Chond-Hyal Ac-Ca Fructo (MOVE FREE JOINT HEALTH ADVANCE) TABS Take 2 tablets by mouth daily      TURMERIC PO Take by mouth      NONFORMULARY Take 8 mg by mouth 2 times daily CBD oil      Onabotulinumtoxin A (BOTOX) 200 units injection Inject 155 Units into the muscle every 30 days      traZODone (DESYREL) 50 MG tablet Take 50 mg by mouth nightly Takes 1.5 tabs at HS      hydrochlorothiazide (MICROZIDE) 12.5 MG capsule Take 12.5 mg by mouth daily.  methylPREDNISolone (MEDROL DOSEPACK) 4 MG tablet TAKE 6 TABLETS ON DAY 1 AS DIRECTED ON PACKAGE AND DECREASE BY 1 TAB EACH DAY FOR A TOTAL OF 6 DAYS (Patient not taking: Reported on 8/2/2021)      gabapentin (NEURONTIN) 100 MG capsule Take 1 capsule by mouth 2 times daily for 30 days. 60 capsule 2    gabapentin (NEURONTIN) 300 MG capsule Take 1 capsule by mouth Daily with lunch AND 2 capsules 2 times daily. Do all this for 30 days. 300 mg in am and at midday, 600 mg qhs. . 450 capsule 3     No current facility-administered medications for this visit. Allergies   Allergen Reactions    Aimovig [Erenumab-Aooe] Swelling    Avelox [Moxifloxacin]     Charcoal Activated [Activated Charcoal] Hives    Sulfa Antibiotics        Review of Systems:  No new weakness, paresthesia, incontinence of bowel or bladder, saddle anesthesia, falls or gait dysfunction. Otherwise, per HPI. Physical Exam:   Blood pressure 120/83, pulse 96, height 5' 2\" (1.575 m), weight 114 lb (51.7 kg), last menstrual period 04/10/2018. GENERAL: The patient is in no apparent distress. Body habitus is non-obese. HEENT: No rhinorrhea, sneezing, yawning, or lacrimation. No scleral icterus or conjunctival injection. SKIN: No piloerection. No tract marks. No rash. PSYCH: Mood and affect are appropriate. Hygiene is appropriate.   CARDIOVASCULAR  Heart is regular rate and rhythm. There is no edema. RESPIRATORY: Respirations are regular and unlabored. There is no cyanosis. GASTROINTESTINAL: Soft abdomen, non-tender. MSK: There is no joint effusion, deformity, instability, swelling, erythema or warmth. AROM is full in the spine and extremities. Spinal curvatures are normal.   Trigger point present bilateral trapezius and right rhomboid    NEURO: Gait is normal. No focal sensorimotor deficit. Reflexes 2+ and symmetric in lower extremities. Impression:   1. Chronic migraine without aura without status migrainosus, not intractable    2. Spondylosis of cervical region without myelopathy or radiculopathy    3. Myofascial pain syndrome, cervical    4. Bilateral occipital neuralgia        Plan:  Orders Placed This Encounter   Medications    ketorolac (TORADOL) injection 15 mg    ketorolac (TORADOL) 10 MG tablet     Sig: Take 1 tablet by mouth every 6 hours as needed for Pain (with food)     Dispense:  12 tablet     Refill:  0    bupivacaine (MARCAINE) 0.25 % injection 10 mg   occipital nerve block today  Continue home exercises. Follow up 6 weeks  Thank you for allowing me to participate in the care of your patient. Nicko Walker D.O., P.T. Board Certified Physical Medicine and Rehabilitation  Board Certified [facility]. [facility] [address] [facility]. [place]  [address] [facility]. [address] [place] [address]  Phone: [phone]  Fax: [phone]    8/2/2021    Chief Complaint   Patient presents with    Migraine     follow up botox migraine       Last injection: n/a  Taking anticoagulants/antiplatelets: No  Diabetic: No  Febrile/active infection: No    After explaining the indications, risks, benefits and alternatives of a Bilateral occipital nerve block, the patient agreed to proceed. A permit was signed and scanned into the media. The patient was placed in the seated position.  The skin was prepared with alcohol. Using an aseptic, no touch technique, a 25 gauge, 5/8\" needle with 2 cc of Bupivicaine 0.25% was directed to the occipital notch at the point of maximal tenderness. After negative aspiration, the medication was injected was injected. Adequate hemostasis was obtained. The patient tolerated the procedure well and was educated in post injection care. The patient was monitored clinically and left the office without incident. Pain was reduced post-injection. Fabrice Swenson D.O., P.T.   Board Certified Physical Medicine and Rehabilitation  Board Certified Electrodiagnostic Medicine    Administrations This Visit     bupivacaine (MARCAINE) 0.25 % injection 10 mg     Admin Date  08/02/2021  16:16 Action  Given Dose  10 mg Route  Intradermal Site  Other Administered By  Dorothy Romero LPN    Ordering Provider: Skinny Tavarez DO ND: 7416-1326-68    Lot#: [id number]    : HOSPIRA    Patient Supplied?: No          ketorolac (TORADOL) injection 15 mg     Admin Date  08/02/2021  10:52 Action  Given Dose  15 mg Route  Intramuscular Site  Deltoid Left Administered By  Dorothy Romero LPN    Ordering Provider: Skinny Tavarez DO    NDC: 6034-7170-59    Lot#: [id number]    : Fantasma Anna    Patient Supplied?: No

## 2021-08-03 ENCOUNTER — TREATMENT (OUTPATIENT)
Dept: OCCUPATIONAL THERAPY | Age: 56
End: 2021-08-03
Payer: COMMERCIAL

## 2021-08-03 DIAGNOSIS — M18.12 ARTHRITIS OF CARPOMETACARPAL (CMC) JOINT OF LEFT THUMB: Primary | ICD-10-CM

## 2021-08-03 PROCEDURE — 97530 THERAPEUTIC ACTIVITIES: CPT | Performed by: OCCUPATIONAL THERAPIST

## 2021-08-03 PROCEDURE — 97140 MANUAL THERAPY 1/> REGIONS: CPT | Performed by: OCCUPATIONAL THERAPIST

## 2021-08-03 PROCEDURE — 97110 THERAPEUTIC EXERCISES: CPT | Performed by: OCCUPATIONAL THERAPIST

## 2021-08-03 PROCEDURE — 97018 PARAFFIN BATH THERAPY: CPT | Performed by: OCCUPATIONAL THERAPIST

## 2021-08-03 NOTE — PROGRESS NOTES
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 Can begin strengthening per .      Assessment of current deficits   []? Functional mobility             [x]? ADLs          [x]? Strength                  []? Cognition   []? Functional transfers           [x]? IADLs         []? Safety Awareness  []? Endurance   []? Fine Motor Coordination    []? Balance      []? Vision/perception   []? Sensation     []? Gross Motor Coordination [x]? ROM           [x]? Pain                        []? Edema          [x]? Scar Adhesion/Skin Integrity      OT PLAN OF CARE   OT POC based on physician orders, patient diagnosis and results of clinical assessment     Frequency/Duration1-2x/ week x 6 weeks  Specific OT Treatment to include:      [x]? Instruction in HEP                   Modalities:  [x]?  Therapeutic Exercise                 [x]? Ultrasound               []? Electrical Stimulation/Attended  [x]? PROM/Stretching                    [x]? Fluidotherapy          [x]?   Paraffin                   [x]? AAROM  [x]?  AROM                 []? Iontophoresis:   []? Tendon Charla Harrell  []? Neuromuscular Re-Ed            []? ADL/IADL re-training      Pain Level: No pain reported- feels tight 2/10. Subjective: \" My thumb is just stiff - doesn't really hurt. \"     Objective:  Updated POC to be completed by 8-7-21. INTERVENTION: COMPLETED: SPECIFICS/COMMENTS:   Modality:     Paraffin Tx- L hand/ wrist x 10 min to decrease stiffness and to promote soft tissue elasticity        AROM:     Thumb AROM X  X  X    x - all planes- 10x each  - thumb slides 5x each digit   - exercise spheres  - in hand manipulation with marbles (4)-   - sm nuts and bolts assembly    Wrist AROM- all planes X  x - all planes/ 10x each  -Juxaciser 5x             PROM/Stretching:               Scar Mass/Edema Control:     Scar massage x    Soft tissue mobilization x    Strengthening:     Hand strengthening x -easy yellow putty - gentle gripping fingers only and manipulation thumb and fingers 4 min. Added to HEP    Wrist/forearm strengthening x - 1# wt - wrist flex, wrist ext, RD - UD and forearm rotation- 10 rep's each. Can add to HEP    Other:     Splint modifications x Blue foam added over the radial wrist portion of the splint/ pt states it is helpful and feels more comfortable   HEP x Thumb AROM all planes, wrist AROM all planes, in hand manipulation w/ golf balls. Added easy putty gripping 3-4 min 1-2 x's a day, 1# wrist strength. Assessment/Comments: Pt is making Good progress toward stated plan of care. Tx completed with a focus on thumb/ wrist ROM and exercises to improve thumb dexterity. Good progress continues in ROM, and functional light thumb use. Per Dr's orders she can begin strengthening and can begin weaning from her splint. Therapist discussed beginning to use her hand more and how to wean from her brace . Therapist started gentle hand strengthening ( no pinching)  With easy putty and wrist with 1`# wt with no ^ in pain. To progress as tolerated.      Re- Assessment: Hand Strength:       Dynamometer (setting 2):                              Left:   25# Right:   46#                            Pinch Meter:              Lateral: Left= 3.5#,    Right= 12#              Palmar 3 point: Left= 4#,     Right= 9#      -Rehab Potential: Good  -Requires OT Follow Up: Yes  Time In:1310            Time Out: 1400             Visit #: 4    Treatment Charges: Mins Units   Modalities: Paraffin 10 1   Ther Exercise 15 1   Manual Therapy 10 1   Thera Activities 15 1   ADL/Home Mgt      Neuro Re-education     Gait Training     Group Therapy     Non-Billable Service Time     Other: ortho fit     Total Time/Units 50 4       -Response to Treatment: Pt is happy with her progress and pleased to begin light strenghtening. .   Goals: Goals for pt can be seen on initial eval occurring on 7-7-21    Plan:   [x]  Continue Plan of care: Continue ROM and progressive functional thumb activities as tolerated. Start strengthening at 8 weeks post op. Treatment covered based on POC and graduated to patient's progress. Pt education continues at each visit to obtain maximum benefits from skilled OT intervention.   []  Alter Plan of care:   []  Discharge:      Katina Hart OT /L ,  CHT

## 2021-08-04 ENCOUNTER — OFFICE VISIT (OUTPATIENT)
Dept: PHYSICAL MEDICINE AND REHAB | Age: 56
End: 2021-08-04
Payer: COMMERCIAL

## 2021-08-04 VITALS
HEIGHT: 62 IN | DIASTOLIC BLOOD PRESSURE: 83 MMHG | TEMPERATURE: 97.2 F | HEART RATE: 93 BPM | WEIGHT: 114 LBS | SYSTOLIC BLOOD PRESSURE: 121 MMHG | BODY MASS INDEX: 20.98 KG/M2

## 2021-08-04 DIAGNOSIS — M54.2 TRIGGER POINT OF NECK: Primary | ICD-10-CM

## 2021-08-04 PROCEDURE — 20553 NJX 1/MLT TRIGGER POINTS 3/>: CPT | Performed by: PHYSICAL MEDICINE & REHABILITATION

## 2021-08-05 RX ORDER — LIDOCAINE HYDROCHLORIDE 10 MG/ML
10 INJECTION, SOLUTION INFILTRATION; PERINEURAL ONCE
Status: COMPLETED | OUTPATIENT
Start: 2021-08-05 | End: 2021-08-05

## 2021-08-05 RX ADMIN — LIDOCAINE HYDROCHLORIDE 10 ML: 10 INJECTION, SOLUTION INFILTRATION; PERINEURAL at 09:22

## 2021-08-05 NOTE — PROGRESS NOTES
Justin Quevedo D.O. [place] [facility]  [address] [facility]  Phone: [phone]  Fax: [phone]    8/5/2021    Chief Complaint   Patient presents with    Neck Pain     trigger point injections       Last injection: 7/21/21  Taking anticoagulants/antiplatelets: No  Diabetic: No  Febrile/active infection: No    After explaining the indications, risks, benefits and alternatives of a Bilateral trapezius and right rhomboid trigger points X3, the patient agreed to proceed. A permit was signed and scanned into the chart. The patient was placed in the  seated position. The skin over the trigger point was prepared with alcohol. Using aseptic no touch technique, a 22 gauge, 1 1/2\" needle with 6 cc of Xylocaine 1% was directed into the trigger point. After negative aspiration, 2 cc of the medication was injected at each of 3 trigger points in a fanning out method. Adequate hemostasis was achieved and a bandage applied. The patient tolerated the procedure well and was educated in post injection care. The patient was clinically monitored after the injection and left the office without incident. There was post-injection reduction in pain and improved range of motion. Justin Quevedo D.O., P.T.   Board Certified Physical Medicine and Rehabilitation  Board Certified Electrodiagnostic Medicine    Administrations This Visit     lidocaine 1 % injection 10 mL     Admin Date  08/05/2021  09:22 Action  Given Dose  10 mL Route  Other Site   Administered By  Kari Alvarez LPN    Ordering Provider: Belkis Duggan DO    NDC: 2380-9829-75    Lot#: [id number]    : HOSPIRA    Patient Supplied?: No

## 2021-08-11 ENCOUNTER — TREATMENT (OUTPATIENT)
Dept: OCCUPATIONAL THERAPY | Age: 56
End: 2021-08-11
Payer: COMMERCIAL

## 2021-08-11 DIAGNOSIS — M18.12 ARTHRITIS OF CARPOMETACARPAL (CMC) JOINT OF LEFT THUMB: Primary | ICD-10-CM

## 2021-08-11 PROCEDURE — 97140 MANUAL THERAPY 1/> REGIONS: CPT | Performed by: OCCUPATIONAL THERAPIST

## 2021-08-11 PROCEDURE — 97110 THERAPEUTIC EXERCISES: CPT | Performed by: OCCUPATIONAL THERAPIST

## 2021-08-11 PROCEDURE — 97530 THERAPEUTIC ACTIVITIES: CPT | Performed by: OCCUPATIONAL THERAPIST

## 2021-08-11 PROCEDURE — 97018 PARAFFIN BATH THERAPY: CPT | Performed by: OCCUPATIONAL THERAPIST

## 2021-08-11 NOTE — PROGRESS NOTES
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 Can begin strengthening per .      Assessment of current deficits   []? Functional mobility             [x]? ADLs          [x]? Strength                  []? Cognition   []? Functional transfers           [x]? IADLs         []? Safety Awareness  []? Endurance   []? Fine Motor Coordination    []? Balance      []? Vision/perception   []? Sensation     []? Gross Motor Coordination [x]? ROM           [x]? Pain                        []? Edema          [x]? Scar Adhesion/Skin Integrity      OT PLAN OF CARE   OT POC based on physician orders, patient diagnosis and results of clinical assessment     Frequency/Duration1-2x/ week x 6 weeks  Specific OT Treatment to include:      [x]? Instruction in HEP                   Modalities:  [x]?  Therapeutic Exercise                 [x]? Ultrasound               []? Electrical Stimulation/Attended  [x]? PROM/Stretching                    [x]? Fluidotherapy          [x]?   Paraffin                   [x]? AAROM  [x]?  AROM                 []? Iontophoresis:   []? Tendon Enrrique Jean Baptiste  []? Neuromuscular Re-Ed            []? ADL/IADL re-training      Pain Level: No pain reported- feels tight 2/10. Subjective: \"I am doing really well-no issue with the strengthening\"     Objective:  Updated POC to be completed by 8-7-21. INTERVENTION: COMPLETED: SPECIFICS/COMMENTS:   Modality:     Paraffin Tx- L hand/ wrist x 10 min to decrease stiffness and to promote soft tissue elasticity        AROM:     Thumb AROM X  X  X    x - all planes- 10x each  - thumb slides 5x each digit   - exercise spheres  - in hand manipulation with marbles (4)-   - sm nuts and bolts assembly    Wrist AROM- all planes X  x - all planes/ 10x each  -Juxaciser 5x             PROM/Stretching:               Scar Mass/Edema Control:     Scar massage x    Soft tissue mobilization x    Strengthening:     Hand strengthening x -easy yellow putty - gentle gripping fingers only and manipulation thumb and fingers 4 min. Added to HEP   -3 point pinch with red light resistive clip with pom poms 15 reps   Wrist/forearm strengthening x - 1# wt - wrist flex, wrist ext, RD - UD and forearm rotation- 10 rep's each. Can add to HEP   -supination and pronation with 6# imer bar 15 reps   Other:     Splint modifications x Blue foam added over the radial wrist portion of the splint/ pt states it is helpful and feels more comfortable   HEP x Thumb AROM all planes, wrist AROM all planes, in hand manipulation w/ golf balls. Added easy putty gripping 3-4 min 1-2 x's a day, 1# wrist strength. Assessment/Comments: Pt is making Good progress toward stated plan of care. Tx completed with a focus on thumb/ wrist ROM and exercises to improve thumb dexterity. Increased strengthening this date-pt tolerated well and instructed to monitor discomfort after strengthening To progress as tolerated.      -Rehab Potential: Good  -Requires OT Follow Up: Yes  Time In:1310            Time Out: 1400             Visit #: 5    Treatment Charges: Mins Units   Modalities: Paraffin 10 1   Ther Exercise 15 1   Manual Therapy 15 1 Thera Activities 15 1   ADL/Home Mgt      Neuro Re-education     Gait Training     Group Therapy     Non-Billable Service Time     Other: ortho fit     Total Time/Units 55 4       -Response to Treatment: Pt is happy with her progress and pleased to begin light strenghtening. .   Goals: Goals for pt can be seen on initial eval occurring on 7-7-21    Plan:   [x]  Continue Plan of care: Continue ROM and progressive functional thumb activities as tolerated. Start strengthening at 8 weeks post op. Treatment covered based on POC and graduated to patient's progress. Pt education continues at each visit to obtain maximum benefits from skilled OT intervention. []  Alter Plan of care:   []  Discharge:       [address] [facility], OTR/L #[id number]

## 2021-08-18 ENCOUNTER — TREATMENT (OUTPATIENT)
Dept: OCCUPATIONAL THERAPY | Age: 56
End: 2021-08-18
Payer: COMMERCIAL

## 2021-08-18 DIAGNOSIS — M18.12 ARTHRITIS OF CARPOMETACARPAL (CMC) JOINT OF LEFT THUMB: Primary | ICD-10-CM

## 2021-08-18 PROCEDURE — 97018 PARAFFIN BATH THERAPY: CPT | Performed by: OCCUPATIONAL THERAPIST

## 2021-08-18 PROCEDURE — 97530 THERAPEUTIC ACTIVITIES: CPT | Performed by: OCCUPATIONAL THERAPIST

## 2021-08-18 PROCEDURE — 97140 MANUAL THERAPY 1/> REGIONS: CPT | Performed by: OCCUPATIONAL THERAPIST

## 2021-08-18 PROCEDURE — 97110 THERAPEUTIC EXERCISES: CPT | Performed by: OCCUPATIONAL THERAPIST

## 2021-08-18 NOTE — PROGRESS NOTES
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 Can begin strengthening per .      Assessment of current deficits   []? Functional mobility             [x]? ADLs          [x]? Strength                  []? Cognition   []? Functional transfers           [x]? IADLs         []? Safety Awareness  []? Endurance   []? Fine Motor Coordination    []? Balance      []? Vision/perception   []? Sensation     []? Gross Motor Coordination [x]? ROM           [x]? Pain                        []? Edema          [x]? Scar Adhesion/Skin Integrity      OT PLAN OF CARE   OT POC based on physician orders, patient diagnosis and results of clinical assessment     Frequency/Duration1-2x/ week x 6 weeks  Specific OT Treatment to include:      [x]? Instruction in HEP                   Modalities:  [x]?  Therapeutic Exercise                 [x]? Ultrasound               []? Electrical Stimulation/Attended  [x]? PROM/Stretching                    [x]? Fluidotherapy          [x]?   Paraffin                   [x]? AAROM  [x]?  AROM                 []? Iontophoresis:   []? Tendon Loura Armor  []? Neuromuscular Re-Ed            []? ADL/IADL re-training    [x]? Therapeutic Activity                  [x]? Pain Management with/without modalities PRN                 [x]?  Manual Therapy                      [x]? Splinting                                   [x]? Scar Management                   []?Joint Protection/Training  []? Ergonomics                             []? Joint Mobilization                      []? Adaptive Equipment Assessment/Training                             []? Manual Edema Mobilization   []? Myofascial Release                 []? Energy Conservation/Work Simplification  [x]? GM/FM Coordination                []? Safety retraining/education per  individual diagnosis/goals  []? Desensitization        Patient Specific Goal: \" Use my thumb better with no pain\".                            FZKXF (Long term same as Short term):  1) Patient will demonstrate good understanding of home program (exercises/activities/diagnosis/prognosis/goals) with good accuracy. 2) Patient will demonstrate increased active/passive range of motion of their left thumb to [facility] for ADL/IADL completion. 3) Patient will demonstrate /pinch strength of at least 30# / 5# pinch of their Left hand. 4) Patient to report decreased pain in their affected lefft hand/ wrist/ upper extremity from 4/10 with light movement to 2/10 or less with resistive functional use. 5) Increase in fine motor function as evidenced by decreased time to complete 9-hole peg test and/or MRMT test by at least 5-10 seconds. 6) Patient to report 100% compliance with their splint wear, care, and precautions if needed. 7) Patient to demonstrate decreased guarding of their affected left thumb/ hand from 90% to 20% or less. 7) Patient will demonstrate a non-tender/non-adherent scar. 8) Patient will report ADL/ IADL functions as Mod I/I using the affected left thumb/ hand. 9) Patient will decrease QuickDASH score to 30% or less for increased participation in daily functional activities. Neuro Re-education     Gait Training     Group Therapy     Non-Billable Service Time     Other: ortho fit     Total Time/Units 60 4       -Response to Treatment: Pt is happy with her progress and has been doing her home exercises. Goals: Goals for pt can be seen on initial eval occurring on 7-7-21    Plan:   [x]  Continue Plan of care: Continue ROM , strengthening, and progressive functional thumb activities as tolerated. Treatment covered based on POC and graduated to patient's progress. Pt education continues at each visit to obtain maximum benefits from skilled OT intervention.   []  [address] [facility] of care:   []  Discharge:      Peggy Sidhu OT/L  [id number]

## 2021-08-31 ENCOUNTER — HOSPITAL ENCOUNTER (OUTPATIENT)
Dept: MAMMOGRAPHY | Age: 56
Discharge: HOME | End: 2021-09-02
Payer: COMMERCIAL

## 2021-08-31 DIAGNOSIS — Z12.31 ENCOUNTER FOR SCREENING MAMMOGRAM FOR MALIGNANT NEOPLASM OF BREAST: ICD-10-CM

## 2021-08-31 PROCEDURE — 77063 BREAST TOMOSYNTHESIS BI: CPT

## 2021-09-01 ENCOUNTER — TREATMENT (OUTPATIENT)
Dept: OCCUPATIONAL THERAPY | Age: 56
End: 2021-09-01
Payer: COMMERCIAL

## 2021-09-01 ENCOUNTER — PATIENT MESSAGE (OUTPATIENT)
Dept: PHYSICAL MEDICINE AND REHAB | Age: 56
End: 2021-09-01

## 2021-09-01 DIAGNOSIS — M18.12 ARTHRITIS OF CARPOMETACARPAL (CMC) JOINT OF LEFT THUMB: Primary | ICD-10-CM

## 2021-09-01 PROCEDURE — 97530 THERAPEUTIC ACTIVITIES: CPT | Performed by: OCCUPATIONAL THERAPIST

## 2021-09-01 PROCEDURE — 97018 PARAFFIN BATH THERAPY: CPT | Performed by: OCCUPATIONAL THERAPIST

## 2021-09-01 PROCEDURE — 97110 THERAPEUTIC EXERCISES: CPT | Performed by: OCCUPATIONAL THERAPIST

## 2021-09-01 NOTE — TELEPHONE ENCOUNTER
Thank you for using [employer]. We have received your message and are currently  addressing your request.  We will get back to you as soon as possible. Thank you.

## 2021-09-01 NOTE — PROGRESS NOTES
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  Plan of care signed (Y/N): Y (thru 10-7-21)  Visit# / total visits:  / up to 12     Referring Practitioner:  Dr Pura Rowe  Specific Practitioner Orders: OT evaluate and treat, splint, etc. Can begin strengthening per .      Assessment of current deficits   []? Functional mobility             [x]? ADLs          [x]? Strength                  []? Cognition   []? Functional transfers           [x]? IADLs         []? Safety Awareness  []? Endurance   []? Fine Motor Coordination    []? Balance      []? Vision/perception   []? Sensation     []? Gross Motor Coordination [x]? ROM           [x]? Pain                        []? Edema          [x]? Scar Adhesion/Skin Integrity      OT PLAN OF CARE   OT POC based on physician orders, patient diagnosis and results of clinical assessment     Frequency/Duration1-2x/ week x 6 weeks  Specific OT Treatment to include:      [x]? Instruction in HEP                   Modalities:  [x]?  Therapeutic Exercise                 [x]? Ultrasound               []? Electrical Stimulation/Attended  [x]? PROM/Stretching                    [x]? Fluidotherapy          [x]?   Paraffin                   [x]? AAROM  [x]?  AROM                 []? Iontophoresis:   []? Tendon Derek Coffer  []? Neuromuscular Re-Ed            []? ADL/IADL re-training    [x]?  Therapeutic Activity                  [x]? Pain Management with/without modalities PRN                 [x]?  Manual Therapy                      [x]? Splinting                                   [x]? Scar Management                   []?Joint Protection/Training  []? Ergonomics                             []? Joint Mobilization                      []? Adaptive Equipment Assessment/Training                             []? Manual Edema Mobilization   []? Myofascial Release                 []? Energy Conservation/Work Simplification  [x]? GM/FM Coordination                []? Safety retraining/education per  individual diagnosis/goals  []? Desensitization        Patient Specific Goal: \" Use my thumb better with no pain\".                            UFTSC (Long term same as Short term):  1) Patient will demonstrate good understanding of home program (exercises/activities/diagnosis/prognosis/goals) with good accuracy. 2) Patient will demonstrate increased active/passive range of motion of their left thumb to [facility] for ADL/IADL completion. 3) Patient will demonstrate /pinch strength of at least 30# / 5# pinch of their Left hand. 4) Patient to report decreased pain in their affected lefft hand/ wrist/ upper extremity from 4/10 with light movement to 2/10 or less with resistive functional use. 5) Increase in fine motor function as evidenced by decreased time to complete 9-hole peg test and/or MRMT test by at least 5-10 seconds. 6) Patient to report 100% compliance with their splint wear, care, and precautions if needed. 7) Patient to demonstrate decreased guarding of their affected left thumb/ hand from 90% to 20% or less. 7) Patient will demonstrate a non-tender/non-adherent scar. 8) Patient will report ADL/ IADL functions as Mod I/I using the affected left thumb/ hand.    9) Patient will decrease QuickDASH score to 30% or less for increased participation in daily functional activities.        Pain Level: No pain reported    Subjective: Pt presents with no new complaints. Objective:  Updated POC to be completed by 9-7-21. INTERVENTION: COMPLETED: SPECIFICS/COMMENTS:   Modality:     Paraffin Tx- L hand/ wrist x 10 min to decrease stiffness and to promote soft tissue elasticity        AROM:     Thumb AROM X  X     - all planes- 10x each  - thumb slides 5x each digit   - exercise spheres  - in hand manipulation with marbles (4)-    Wrist AROM- all planes X  x - all planes/ 10x each  -Juxaciser ^ to 10x             PROM/Stretching:               Scar Mass/Edema Control:     Scar massage x    Soft tissue mobilization x    Strengthening:     Hand strengthening     x    x -easy yellow putty - gentle gripping fingers only and manipulation thumb and fingers 4 min. Added to HEP   -3 point pinch with red light resistive clip with pom poms 15 reps (using 3 jaw and lateral pinches)  - digiflex 4#- red) composite 10x/ each digit 10x   Wrist/forearm strengthening X    x - ^ to 2# wt - wrist flex, wrist ext, RD - UD and forearm rotation- 10 rep's each. -supination and pronation with resistve dowel and 1# 20 reps   Other:     Splint modifications x Blue foam added over the radial wrist portion of the splint/ pt states it is helpful and feels more comfortable   HEP x Thumb AROM all planes, wrist AROM all planes, in hand manipulation w/ golf balls. Added easy putty gripping 3-4 min 1-2 x's a day, 1# wrist strength. Assessment/Comments: Pt is making Good progress toward stated plan of care. Hand/ wrist strengthening  Is continued with good tolerance. Pt states she is using her wrist/ hand well during the day. The only discomfort is reported with light weight bearing into the palm. Pt says she does try to avoid WB but sometimes forgets.      -Rehab Potential: Good  -Requires OT Follow Up: Yes  Time In:1515         Time Out: 1600             Visit #: 7    Treatment Charges: Mins Units   Modalities: Paraffin 10 1   Ther Exercise 15 1   Manual Therapy 5    Thera Activities 15 1   ADL/Home Mgt      Neuro Re-education     Gait Training     Group Therapy     Non-Billable Service Time     Other: ortho fit     Total Time/Units 45 3       -Response to Treatment: Pt is happy with her progress and has been doing her home exercises. Goals: Goals for pt can be seen on initial eval occurring on 7-7-21    Plan:   [x]  Continue Plan of care: re-evaluate status at next visit. Treatment covered based on POC and graduated to patient's progress. Pt education continues at each visit to obtain maximum benefits from skilled OT intervention.   []  [address] of care:   []  Discharge:      Valencia Traore OT/L  [id number]

## 2021-09-01 NOTE — TELEPHONE ENCOUNTER
From: Kristian Trejo  To: Svitlana Guthrie DO  Sent: 9/1/2021 10:01 AM EDT  Subject: Non-Urgent Medical Question    Good morning Dr. Tyson Figueroa,     I hope you are both doing well. I was hoping I could get some trigger point injections possibly this week or next week. It's my right side shoulder/neck problem area. Let me know.      Thank you,     Eveline Gutiérrez

## 2021-09-03 ENCOUNTER — OFFICE VISIT (OUTPATIENT)
Dept: PHYSICAL MEDICINE AND REHAB | Age: 56
End: 2021-09-03
Payer: COMMERCIAL

## 2021-09-03 VITALS
SYSTOLIC BLOOD PRESSURE: 125 MMHG | HEIGHT: 62 IN | BODY MASS INDEX: 21.35 KG/M2 | WEIGHT: 116 LBS | DIASTOLIC BLOOD PRESSURE: 86 MMHG | HEART RATE: 81 BPM

## 2021-09-03 DIAGNOSIS — G43.709 CHRONIC MIGRAINE WITHOUT AURA WITHOUT STATUS MIGRAINOSUS, NOT INTRACTABLE: ICD-10-CM

## 2021-09-03 PROCEDURE — 20552 NJX 1/MLT TRIGGER POINT 1/2: CPT | Performed by: PHYSICAL MEDICINE & REHABILITATION

## 2021-09-03 RX ORDER — PANTOPRAZOLE SODIUM 40 MG/1
TABLET, DELAYED RELEASE ORAL 2 TIMES DAILY
COMMUNITY
Start: 2021-08-02

## 2021-09-03 RX ORDER — BUTALBITAL, ACETAMINOPHEN AND CAFFEINE 300; 40; 50 MG/1; MG/1; MG/1
1 CAPSULE ORAL 2 TIMES DAILY PRN
Qty: 60 CAPSULE | Refills: 2 | Status: SHIPPED
Start: 2021-09-03 | End: 2021-10-26 | Stop reason: SDUPTHER

## 2021-09-03 RX ORDER — PROMETHAZINE HYDROCHLORIDE 12.5 MG/1
SUPPOSITORY RECTAL
COMMUNITY
Start: 2021-08-10 | End: 2022-06-13 | Stop reason: ALTCHOICE

## 2021-09-03 RX ORDER — LIDOCAINE HYDROCHLORIDE 10 MG/ML
2 INJECTION, SOLUTION INFILTRATION; PERINEURAL ONCE
Status: COMPLETED | OUTPATIENT
Start: 2021-09-03 | End: 2021-09-03

## 2021-09-03 RX ORDER — MELOXICAM 15 MG/1
TABLET ORAL
COMMUNITY
Start: 2021-08-17

## 2021-09-03 RX ADMIN — LIDOCAINE HYDROCHLORIDE 2 ML: 10 INJECTION, SOLUTION INFILTRATION; PERINEURAL at 15:49

## 2021-09-03 NOTE — PROGRESS NOTES
Amber Horner D.O. [facility]  [address] [facility] [place]  Phone: [phone]  Fax: [phone]    9/3/2021    Chief Complaint   Patient presents with    Neck Pain     trigger point injections       Last injection: n/a  Taking anticoagulants/antiplatelets: No  Diabetic: No  Febrile/active infection: No    After explaining the indications, risks, benefits and alternatives of a Right trapezius trigger point, the patient agreed to proceed. A permit was signed and scanned into the chart. The patient was placed in the  seated position. The skin over the trigger point was prepared with alcohol. Using aseptic no touch technique, a 22 gauge, 1 1/2\" needle with 2 cc of Xylocaine 1% was directed into the trigger point. After negative aspiration, the medication was injected in a fanning out method. Adequate hemostasis was achieved and a bandage applied. The patient tolerated the procedure well and was educated in post injection care. The patient was clinically monitored after the injection and left the office without incident. There was post-injection reduction in pain and improved range of motion. Amber Horner D.O., P.T.   Board Certified Physical Medicine and Rehabilitation  Board Certified Electrodiagnostic Medicine    Administrations This Visit     lidocaine 1 % injection 2 mL     Admin Date  09/03/2021  15:49 Action  Given Dose  2 mL Route  Other Site   Administered By  James Fried RN    Ordering Provider: Yanet Wallace DO    NDC: 7847-3949-50    Lot#: -[id number]    : Hal Castillo    Patient Supplied?: No

## 2021-09-07 ENCOUNTER — TELEPHONE (OUTPATIENT)
Dept: PHYSICAL MEDICINE AND REHAB | Age: 56
End: 2021-09-07

## 2021-09-07 NOTE — TELEPHONE ENCOUNTER
Spoke with [facility] pharmacist with [facility] to call in new script for Botox 200 units inject 200 units im q 12 weeks 1 vial with 1 refill.

## 2021-09-09 ENCOUNTER — TREATMENT (OUTPATIENT)
Dept: OCCUPATIONAL THERAPY | Age: 56
End: 2021-09-09
Payer: COMMERCIAL

## 2021-09-09 ENCOUNTER — TELEPHONE (OUTPATIENT)
Dept: PHYSICAL MEDICINE AND REHAB | Age: 56
End: 2021-09-09

## 2021-09-09 DIAGNOSIS — M18.12 ARTHRITIS OF CARPOMETACARPAL (CMC) JOINT OF LEFT THUMB: Primary | ICD-10-CM

## 2021-09-09 PROCEDURE — 97110 THERAPEUTIC EXERCISES: CPT | Performed by: OCCUPATIONAL THERAPIST

## 2021-09-09 PROCEDURE — 97018 PARAFFIN BATH THERAPY: CPT | Performed by: OCCUPATIONAL THERAPIST

## 2021-09-09 PROCEDURE — 97530 THERAPEUTIC ACTIVITIES: CPT | Performed by: OCCUPATIONAL THERAPIST

## 2021-09-09 NOTE — PROGRESS NOTES
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 Can begin strengthening per .      Assessment of current deficits   []? Functional mobility             [x]? ADLs          [x]? Strength                  []? Cognition   []? Functional transfers           [x]? IADLs         []? Safety Awareness  []? Endurance   []? Fine Motor Coordination    []? Balance      []? Vision/perception   []? Sensation     []? Gross Motor Coordination [x]? ROM           [x]? Pain                        []? Edema          [x]? Scar Adhesion/Skin Integrity      OT PLAN OF CARE   OT POC based on physician orders, patient diagnosis and results of clinical assessment     Frequency/Duration1-2x/ week x 6 weeks  Specific OT Treatment to include:      [x]? Instruction in HEP                   Modalities:  [x]?  Therapeutic Exercise                 [x]? Ultrasound               []? Electrical Stimulation/Attended  [x]? PROM/Stretching                    [x]? Fluidotherapy          [x]?   Paraffin                   [x]? AAROM  [x]?  AROM                 []? Iontophoresis:   []? Tendon Hugo Leroy  []? Neuromuscular Re-Ed            []? ADL/IADL re-training    [x]?  Therapeutic Activity [x]? Pain Management with/without modalities PRN                 [x]?  Manual Therapy                      [x]? Splinting                                   [x]? Scar Management                   []?Joint Protection/Training  []? Ergonomics                             []? Joint Mobilization                      []? Adaptive Equipment Assessment/Training                             []? Manual Edema Mobilization   []? Myofascial Release                 []? Energy Conservation/Work Simplification  [x]? GM/FM Coordination                []? Safety retraining/education per  individual diagnosis/goals  []? Desensitization        Patient Specific Goal: \" Use my thumb better with no pain\".                            GOALS (Long term same as Short term):  1) Patient will demonstrate good understanding of home program (exercises/activities/diagnosis/prognosis/goals) with good accuracy. (goal met for ROM, stretches, scar mgmt, and wrist/ hand strengthening)    2) Patient will demonstrate increased active/passive range of motion of their left thumb to [facility] for ADL/IADL completion. (goal met as stated below)    3) Patient will demonstrate /pinch strength of at least 30# / 5# pinch of their Left hand. (goal met as stated below)    4) Patient to report decreased pain in their affected lefft hand/ wrist/ upper extremity from 4/10 with light movement to 2/10 or less with resistive functional use. (goal met- pain has resolved)    5) Increase in fine motor function as evidenced by decreased time to complete 9-hole peg test and/or MRMT test by at least 5-10 seconds. (goal met- no coordination issues present)    6) Patient to report 100% compliance with their splint wear, care, and precautions if needed.  (splint is discontinued- goal met)    7) Patient to demonstrate decreased guarding of their affected left thumb/ hand from 90% to 20% or less. (goal met- guarding is resolved)     7) Patient will demonstrate a non-tender/non-adherent scar. ( Goal met)    8) Patient will report ADL/ IADL functions as Mod I/I using the affected left thumb/ hand. (goal met- pt has returned to all activity with good use of the left hand)    9) Patient will decrease QuickDASH score to 30% or less for increased participation in daily functional activities. (goal met at 4% impairment)       Pain Level: No pain reported  Subjective: Pt presents with no new complaints. Objective:  Updated POC to be completed by 9-7-21. INTERVENTION: COMPLETED: SPECIFICS/COMMENTS:   Modality:     Paraffin Tx- L hand/ wrist x 10 min to decrease stiffness and to promote soft tissue elasticity        AROM:     Thumb AROM X  X     - all planes- 10x each  - thumb slides 5x each digit   - exercise spheres  - in hand manipulation with marbles (4)-    Wrist AROM- all planes X   - all planes/ 10x each  -Juxaciser ^ to 10x             PROM/Stretching:               Scar Mass/Edema Control:     Scar massage x    Soft tissue mobilization x    Strengthening:     Hand strengthening          -easy yellow putty - gentle gripping fingers only and manipulation thumb and fingers 4 min. Added to HEP   -3 point pinch with red light resistive clip with pom poms 15 reps (using 3 jaw and lateral pinches)  - digiflex 4#- red) composite 10x/ each digit 10x   Wrist/forearm strengthening      - ^ to 2# wt - wrist flex, wrist ext, RD - UD and forearm rotation- 10 rep's each. -supination and pronation with resistve dowel and 1# 20 reps   Other:     Splint modifications x Blue foam added over the radial wrist portion of the splint/ pt states it is helpful and feels more comfortable   HEP x Thumb AROM all planes, wrist AROM all planes, in hand manipulation w/ golf balls. Added easy putty gripping 3-4 min 1-2 x's a day, 1# wrist strength. Assessment/Comments: Pt is making Good progress toward stated plan of care. Status retested today to show great progress in all areas.  Pt has met all OT goals and feels she is ready for discharge. Wrist AROM: WFL  Thumb ROM-ABD 0-57  Thumb ext 0-75  Thumb flexion- full  Opposition- full     L- 39#    R-40#  Lateral pinch L-6.5#    R-12#  3 jaw  L- 6#     R-7.5#      -Rehab Potential: Good  -Requires OT Follow Up: Yes  Time In:1510         Time Out: 1028             Visit #: 8    Treatment Charges: Mins Units   Modalities: Paraffin 10 1   Ther Exercise 15 1   Manual Therapy     Thera Activities 20 1   ADL/Home Mgt      Neuro Re-education     Gait Training     Group Therapy     Non-Billable Service Time     Other: ortho fit     Total Time/Units 45 3       -Response to Treatment: Pt is happy with her progress and has been doing her home exercises. Goals: Goals for pt can be seen on initial eval occurring on 7-7-21    Plan:   []  Continue Plan of care: re-evaluate status at next visit. Treatment covered based on POC and graduated to patient's progress. Pt education continues at each visit to obtain maximum benefits from skilled OT intervention. []  Alter Plan of care:   [x]  Discharge: Continue HEP.        Halle Feliciano OT/L  [id number]

## 2021-09-09 NOTE — TELEPHONE ENCOUNTER
Called and spoke with Kaleb Seals from [facility] to schedule delivery for Botox 200 unit will be delivered on 9-10-21.

## 2021-09-15 ENCOUNTER — OFFICE VISIT (OUTPATIENT)
Dept: PHYSICAL MEDICINE AND REHAB | Age: 56
End: 2021-09-15
Payer: COMMERCIAL

## 2021-09-15 VITALS
WEIGHT: 116 LBS | HEART RATE: 83 BPM | BODY MASS INDEX: 21.35 KG/M2 | TEMPERATURE: 97.3 F | HEIGHT: 62 IN | SYSTOLIC BLOOD PRESSURE: 117 MMHG | DIASTOLIC BLOOD PRESSURE: 84 MMHG

## 2021-09-15 DIAGNOSIS — G43.709 CHRONIC MIGRAINE WITHOUT AURA WITHOUT STATUS MIGRAINOSUS, NOT INTRACTABLE: Primary | ICD-10-CM

## 2021-09-15 PROCEDURE — 64615 CHEMODENERV MUSC MIGRAINE: CPT | Performed by: PHYSICAL MEDICINE & REHABILITATION

## 2021-09-15 RX ORDER — GABAPENTIN 100 MG/1
100 CAPSULE ORAL 3 TIMES DAILY
Qty: 90 CAPSULE | Refills: 0 | Status: SHIPPED
Start: 2021-09-15 | End: 2022-06-13 | Stop reason: DRUGHIGH

## 2021-09-15 NOTE — PROGRESS NOTES
Shabbir Penn D.O. [facility]  [address] [facility] [place]  Phone: [phone]  Fax: [phone]    9/17/2021    Chief Complaint   Patient presents with    Migraine     Botox injection 200 units       · Informed Consent:  The indications, risks, benefits, and  alternatives of onabotulinum toxin A were discussed with the patient. I explained to the patient the potential side effects including but not limited to: distant spread of toxin effect causing droopy eyelids, facial drooping, neck pain, headache, double vision, muscle pain/spasm/weakness/stiffness,  bronchitis,  injection site pain, increased blood pressure, hypersensitivity, anaphylaxis, difficulty swallowing, difficulty speaking, urinary incontinence and breathing difficulty. The patient is aware that swallowing and breathing difficulties can be life threatening and there have been reports of death in some cases where onabotulinum toxin was injected. The patient was advised of the expected benefit to include less headache days per month, and the anticipated duration of effect of 3 months. A permit was signed and scanned into the media. · Last injection:10/12/20  · Taking anticoagulants/antiplatelets: No  · Diabetic: No  · Febrile/active infection: No    · Procedure note: After obtaining verbal and written consent from the patient for injection of  onabotulinum toxin A the patient agreed to proceed. 200 units of onabotulinum toxin A was reconstituted using 4 cc of preservative free normal saline ( 5 units per 0.1 ml). The medication was injected using a 30 g 0.5\" needle and a 1 cc syringe. The skin was prepared with Betadine.   Using a no touch technique, the injections were carried out at the following sites: bilateral Temporalis 40 units divided at 8 sites, bilateral Occipitalis 30 units divided at 6 sites, bilateral cervical paraspinals 20 units divided at 4 sites, and bilateral trapezius 30 units divided at 6 sites, right rhomboid 5 units, bilateral SCM 5 units, bilateral masseter 5 units. The total was 145 units and as a result there was 55 units of unavoidable waste. Adequate hemostasis was obtained. Ice was applied for 20 minutes post injection. The patient tolerated the procedure well. There were no complications. The patient was educated in post-procedure care including ice 20 minutes on/20 minutes off prn pain/bruising/swelling, advised to avoid hats and rubbing the forehead for 1-2 days and to call if any fevers chills, night sweats, drainage, increased pain, weakness, difficulty breathing or swallowing. Patient advised to expect improvements in about 2 weeks.  follow up 6 weeks      Lyndon Evans D.O., P.T.   Board Certified Physical Medicine and Rehabilitation  Board Certified Electrodiagnostic Medicine    Administrations This Visit     Onabotulinumtoxin A (BOTOX (COSMETIC)) injection 200 Units     Admin Date  09/15/2021  16:00 Action  Given Dose  200 Units Route  IntraMUSCular Site  Other Administered By  Aflredo Angel MA    Ordering Provider: Precious Cruz DO    NDC: 3131-4911-36    Lot#: [id number]    : Ana Cristina Rasmussen    Patient Supplied?: Yes

## 2021-09-27 ENCOUNTER — PATIENT MESSAGE (OUTPATIENT)
Dept: PHYSICAL MEDICINE AND REHAB | Age: 56
End: 2021-09-27

## 2021-09-27 NOTE — TELEPHONE ENCOUNTER
Thank you for using [employer]. We have received your message and are currently  addressing your request.  We will get back to you as soon as possible. Thank you.

## 2021-09-27 NOTE — TELEPHONE ENCOUNTER
From: Ovid Clock  To: Val Scheuermann, DO  Sent: 9/27/2021 8:08 AM EDT  Subject: Non-Urgent Medical Question    Good morning Dr. Sweetie Luna and ladies,     Happy Monday! I'm sorry to keep bothering you. I did receive a toradol shot at my family doctor's office on Friday. Oddly enough, this did not stop these cycle of migraines. I'm not sure if it's because I did not take the toradol for a few days afterwards. I was curious if I'd be able to get trigger point injections this week? Or maybe that block that Dr. Sweetie Luna did a few months ago? It just feels like a belt is attached to the back of my neck and pulling down my back and across my temples. I've been doing stretches with a warm rice bag twice a day since Wednesday. I'm also using Arnica cream.     Let me know. Thanks & I hope you have a great day.      Bryant Lopez

## 2021-09-29 ENCOUNTER — OFFICE VISIT (OUTPATIENT)
Dept: PHYSICAL MEDICINE AND REHAB | Age: 56
End: 2021-09-29
Payer: COMMERCIAL

## 2021-09-29 VITALS
WEIGHT: 115 LBS | BODY MASS INDEX: 21.16 KG/M2 | DIASTOLIC BLOOD PRESSURE: 86 MMHG | HEART RATE: 94 BPM | HEIGHT: 62 IN | SYSTOLIC BLOOD PRESSURE: 127 MMHG

## 2021-09-29 DIAGNOSIS — M54.81 BILATERAL OCCIPITAL NEURALGIA: ICD-10-CM

## 2021-09-29 DIAGNOSIS — M54.2 TRIGGER POINT OF NECK: ICD-10-CM

## 2021-09-29 DIAGNOSIS — G43.709 CHRONIC MIGRAINE WITHOUT AURA WITHOUT STATUS MIGRAINOSUS, NOT INTRACTABLE: Primary | ICD-10-CM

## 2021-09-29 PROCEDURE — 20552 NJX 1/MLT TRIGGER POINT 1/2: CPT | Performed by: PHYSICAL MEDICINE & REHABILITATION

## 2021-09-29 PROCEDURE — 99213 OFFICE O/P EST LOW 20 MIN: CPT | Performed by: PHYSICAL MEDICINE & REHABILITATION

## 2021-09-29 PROCEDURE — 64405 NJX AA&/STRD GR OCPL NRV: CPT | Performed by: PHYSICAL MEDICINE & REHABILITATION

## 2021-09-29 RX ORDER — LIDOCAINE HYDROCHLORIDE 10 MG/ML
4 INJECTION, SOLUTION INFILTRATION; PERINEURAL ONCE
Status: COMPLETED | OUTPATIENT
Start: 2021-09-29 | End: 2021-09-29

## 2021-09-29 RX ORDER — BUPIVACAINE HYDROCHLORIDE 2.5 MG/ML
2 INJECTION, SOLUTION INFILTRATION; PERINEURAL ONCE
Status: COMPLETED | OUTPATIENT
Start: 2021-09-29 | End: 2021-09-29

## 2021-09-29 RX ORDER — FLUCONAZOLE 150 MG/1
TABLET ORAL
COMMUNITY
Start: 2021-09-19 | End: 2022-01-20 | Stop reason: ALTCHOICE

## 2021-09-29 RX ADMIN — BUPIVACAINE HYDROCHLORIDE 5 MG: 2.5 INJECTION, SOLUTION INFILTRATION; PERINEURAL at 14:58

## 2021-09-29 RX ADMIN — LIDOCAINE HYDROCHLORIDE 4 ML: 10 INJECTION, SOLUTION INFILTRATION; PERINEURAL at 14:57

## 2021-09-29 NOTE — PROGRESS NOTES
Edel Montalvo D.O. [facility]   [facility] [address] [facility]  Phone: [phone]  Fax: [phone]        21    Chief Complaint   Patient presents with    Migraine     Follow up       HPI:  Bethany Lindsay is a 48y.o. year old woman seen today in follow up regarding neck pain. Interval history: Since the last visit the patient states that after the Botox injections at last visit she has increased paraspinal neck pain. Today, the pain is rated Pain Score:   6 where 0 is no pain and 10 is pain as bad as it can be. The pain is located in the upper cervical paraspinals,  does not radiate, and is described as tight, aching. This pain occurs all day. The symptoms have been worse since onset. Symptoms are exacerbated by laying down. Factors which relieve the pain include Toradol. Other associated symptoms include stiffness. Otherwise, the pain assessment has not changed since the last visit.      Past Medical History:   Diagnosis Date    Endometriosis     Headache     Hypertension     Osteoarthritis        Past Surgical History:   Procedure Laterality Date    ABDOMEN SURGERY      X2 Laproscopic for endometriosis    BREAST SURGERY      HYSTERECTOMY, VAGINAL      KNEE SURGERY Bilateral     X5 on each    NECK SURGERY      RHINOPLASTY      TONSILLECTOMY      WRIST GANGLION EXCISION      LEFT       Social History     Tobacco Use    Smoking status: Former Smoker     Packs/day: 0.25     Types: Cigarettes     Quit date: 2020     Years since quittin.1    Smokeless tobacco: Never Used   Vaping Use    Vaping Use: Never used   Substance Use Topics    Alcohol use: Yes     Comment: socially    Drug use: Never       Family History   Problem Relation Age of Onset    Osteoporosis Mother     Hypertension Father     Cancer Father        Current Outpatient Medications   Medication Sig Dispense Refill    fluconazole (DIFLUCAN) 150 MG tablet TAKE 1 TABLET BY MOUTH EVERY OTHER DAY      gabapentin (NEURONTIN) 100 MG capsule Take 1 capsule by mouth 3 times daily for 30 days.  90 capsule 0    PROMETHEGAN 12.5 MG suppository PLACE 1 SUPPOSITORY RECTALLY EVERY 6 HOURS AS NEEDED FOR NAUSEA      pantoprazole (PROTONIX) 40 MG tablet TAKE 1 TABLET BY MOUTH EVERY DAY      meloxicam (MOBIC) 15 MG tablet TAKE 1/2 TABLET BY MOUTH DAILY      butalbital-APAP-caffeine (FIORICET) -40 MG CAPS per capsule Take 1 capsule by mouth 2 times daily as needed for Headaches 60 capsule 2    estradiol (ESTRACE) 0.1 MG/GM vaginal cream APPLY 1 GM IN THE VAGINA WEEKLY      triamcinolone (KENALOG) 0.1 % cream APPLY TO AFFECTED AREA TWICE A DAY AS NEEDED FOR THREE WEEKS WITH FLARES      azelastine (ASTELIN) 0.1 % nasal spray by Nasal route      ZOLMitriptan (ZOMIG) 5 MG tablet TAKE 1 TABLET BY MOUTH AS NEEDED FOR MIGRAINE      omeprazole (PRILOSEC) 20 MG delayed release capsule TAKE 1 CAPSULE BY MOUTH TWICE A DAY      nystatin (MYCOSTATIN) 474231 UNIT/GM cream APPLY TO AFFECTED AREA 4 TIMES A DAY      hydrOXYzine (ATARAX) 25 MG tablet TAKE 1 TABLET BY MOUTH TWICE A DAY      acetaminophen (TYLENOL) 500 MG tablet Acetaminophen TYLENOL EXTRA STRENGTH 500 MG TABS take as directed on bottle as needed 2020/08/05 ACETAMINOPHEN [id number] Kurtis Fisher MD 08-  Lisa ([id number])      ondansetron (ZOFRAN-ODT) 4 MG disintegrating tablet Take 1 tablet by mouth 3 times daily as needed for Nausea or Vomiting 21 tablet 0    cyclobenzaprine (FLEXERIL) 10 MG tablet Take 1 tablet by mouth 2 times daily as needed for Muscle spasms (may cause drowsiness) 180 tablet 3    topiramate (TOPAMAX) 50 MG tablet Take 1 tablet by mouth 2 times daily Take two tablet in the morning and two tablets at hs. 180 tablet 3    Lasmiditan Succinate (REYVOW) 50 MG TABS Take 50 mg by mouth daily as needed (headache) 10 tablet 2    cyproheptadine (PERIACTIN) 4 MG tablet Take 4 mg by mouth 2 times daily      NONFORMULARY Take 8 mg by mouth 2 times daily CBD oil      Onabotulinumtoxin A (BOTOX) 200 units injection Inject 155 Units into the muscle every 30 days      hydrochlorothiazide (MICROZIDE) 12.5 MG capsule Take 12.5 mg by mouth daily. No current facility-administered medications for this visit. Allergies   Allergen Reactions    Aimovig [Erenumab-Aooe] Swelling    Avelox [Moxifloxacin]     Charcoal Activated [Activated Charcoal] Hives    Sulfa Antibiotics        Review of Systems:  No new weakness, paresthesia, incontinence of bowel or bladder, saddle anesthesia, falls or gait dysfunction. Otherwise, per HPI. Physical Exam:   Blood pressure 127/86, pulse 94, height 5' 2\" (1.575 m), weight 115 lb (52.2 kg), last menstrual period 04/10/2018. GENERAL: The patient is in no apparent distress. Body habitus is non-obese. MSK: There is no joint effusion, deformity, instability, swelling, erythema or warmth. AROM is full in the spine and extremities. Spinal curvatures are normal.   Trigger points present bilateral trapezius. Reproduction of headache with palpation of occipital notch. NEURO: Gait is normal. No focal sensorimotor deficit. Reflexes 2+ and symmetric in lower extremities. Impression:   1. Chronic migraine without aura without status migrainosus, not intractable    2. Trigger point of neck    3. Bilateral occipital neuralgia        Plan:  Trigger point injection today  Bilateral occipital nerve block today.     Medications Discontinued During This Encounter   Medication Reason    traZODone (DESYREL) 50 MG tablet LIST CLEANUP    methylPREDNISolone (MEDROL DOSEPACK) 4 MG tablet Therapy completed    penicillin v potassium (VEETID) 500 MG tablet Therapy completed    ketorolac (TORADOL) 10 MG tablet Therapy completed    hydrOXYzine (VISTARIL) 25 MG capsule LIST CLEANUP       The patient was educated about the diagnosis, prognosis, Route  IntraDERmal Site  Other Administered By  Kari Huber MA    Ordering Provider: Violet Blood DO    NDC: 4278-4437-75    Lot#: [id number]    : HOSPIRA    Patient Supplied?: No          lidocaine 1 % injection 4 mL     Admin Date  09/29/2021  14:57 Action  Given Dose  4 mL Route  Other Site   Administered By  Kari Huber MA    Ordering Provider: Violet Blood DO    Anayeli Kaminski 47: [id number]    Lot#: [id number]    : Philip Cease    Patient Supplied?: No                Liyah Hanks D.O. [facility]  [address] [facility]. [address] [place] [address]  Phone: [phone]  Fax: [phone]    9/29/2021    Chief Complaint   Patient presents with    Migraine     Follow up       Last injection: 8/2/21  Taking anticoagulants/antiplatelets: No  Diabetic: No  Febrile/active infection: No    After explaining the indications, risks, benefits and alternatives of a Bilateral occipital nerve block, the patient agreed to proceed. A permit was signed and scanned into the media. The patient was placed in the seated position. The skin was prepared with alcohol. Using an aseptic, no touch technique, a 25 gauge, 5/8\" needle with 2 cc of Bupivicaine 0.25% was directed to the occipital notch at the point of maximal tenderness. After negative aspiration, the medication was injected was injected. Adequate hemostasis was obtained. The patient tolerated the procedure well and was educated in post injection care. The patient was monitored clinically and left the office without incident. Pain was reduced post-injection. Liyah Hanks D.O., P.T.   Board Certified Physical Medicine and Rehabilitation  Board Certified Electrodiagnostic Medicine    Administrations This Visit     bupivacaine (MARCAINE) 0.25 % injection 5 mg     Admin Date  09/29/2021  14:58 Action  Given Dose  5 mg Route  IntraDERmal Site  Other Administered By  Kari Huber MA    Ordering Provider: Nadia Trujillo DO    NDC: 1115-6482-18    Lot#: [id number]    : HOSPIRA    Patient Supplied?: No          lidocaine 1 % injection 4 mL     Admin Date  09/29/2021  14:57 Action  Given Dose  4 mL Route  Other Site   Administered By  Yann Calabrese MA    Ordering Provider: Nadia Trujillo DO    NDC: 4224-7697-83    Lot#: [id number]    : Obi Melton    Patient Supplied?: No

## 2021-10-26 ENCOUNTER — OFFICE VISIT (OUTPATIENT)
Dept: PHYSICAL MEDICINE AND REHAB | Age: 56
End: 2021-10-26
Payer: COMMERCIAL

## 2021-10-26 VITALS
DIASTOLIC BLOOD PRESSURE: 89 MMHG | SYSTOLIC BLOOD PRESSURE: 130 MMHG | HEART RATE: 102 BPM | BODY MASS INDEX: 20.43 KG/M2 | HEIGHT: 62 IN | WEIGHT: 111 LBS

## 2021-10-26 DIAGNOSIS — M54.81 BILATERAL OCCIPITAL NEURALGIA: Primary | ICD-10-CM

## 2021-10-26 DIAGNOSIS — G43.709 CHRONIC MIGRAINE WITHOUT AURA WITHOUT STATUS MIGRAINOSUS, NOT INTRACTABLE: ICD-10-CM

## 2021-10-26 PROCEDURE — 64405 NJX AA&/STRD GR OCPL NRV: CPT | Performed by: PHYSICAL MEDICINE & REHABILITATION

## 2021-10-26 PROCEDURE — 99214 OFFICE O/P EST MOD 30 MIN: CPT | Performed by: PHYSICAL MEDICINE & REHABILITATION

## 2021-10-26 RX ORDER — BUPIVACAINE HYDROCHLORIDE 2.5 MG/ML
4 INJECTION, SOLUTION INFILTRATION; PERINEURAL ONCE
Status: COMPLETED | OUTPATIENT
Start: 2021-10-26 | End: 2021-10-27

## 2021-10-26 RX ORDER — BUTALBITAL, ACETAMINOPHEN AND CAFFEINE 300; 40; 50 MG/1; MG/1; MG/1
1 CAPSULE ORAL 2 TIMES DAILY PRN
Qty: 60 CAPSULE | Refills: 2 | Status: SHIPPED
Start: 2021-10-26 | End: 2022-01-26 | Stop reason: SDUPTHER

## 2021-10-26 RX ORDER — CYCLOBENZAPRINE HCL 10 MG
10 TABLET ORAL 2 TIMES DAILY PRN
Qty: 180 TABLET | Refills: 3 | Status: SHIPPED
Start: 2021-10-26 | End: 2022-10-14 | Stop reason: SDUPTHER

## 2021-10-26 NOTE — PROGRESS NOTES
Clark Lobo D.O. [place] [facility]   [address] [facility]  Phone: [phone]  Fax: [phone]        10/27/21    Chief Complaint   Patient presents with    Migraine     6 week follow up Botox       HPI:  Aletha Cortez is a 48y.o. year old woman seen today in follow up regarding neck pain. Interval history: Since the last visit the patient states her PCP diagnosed her with sinus infection and she has been treated with antibiotics. She has been very fatigued and sleeping a lot. She had Botox  and had no complications. She has been seeing a massage therapist every 3 weeks. She doing ice, stretching daily. Today, the pain is rated Pain Score:   4 where 0 is no pain and 10 is pain as bad as it can be. The pain is located in the upper cervical paraspinals,  does not radiate, and is described as tight, aching. This pain occurs all day. The symptoms have been worse since onset. Symptoms are exacerbated by laying down. Factors which relieve the pain include Toradol. Other associated symptoms include stiffness. Otherwise, the pain assessment has not changed since the last visit.      Past Medical History:   Diagnosis Date    Endometriosis     Headache     Hypertension     Osteoarthritis        Past Surgical History:   Procedure Laterality Date    ABDOMEN SURGERY      X2 Laproscopic for endometriosis    BREAST SURGERY      HYSTERECTOMY, VAGINAL      KNEE SURGERY Bilateral     X5 on each    NECK SURGERY      RHINOPLASTY      TONSILLECTOMY      WRIST GANGLION EXCISION      LEFT       Social History     Tobacco Use    Smoking status: Former Smoker     Packs/day: 0.25     Types: Cigarettes     Quit date: 2020     Years since quittin.1    Smokeless tobacco: Never Used   Vaping Use    Vaping Use: Never used   Substance Use Topics    Alcohol use: Yes     Comment: socially    Drug use: Never       Family History   Problem Relation Age of Onset    Osteoporosis Mother     Hypertension Father     Cancer Father        Current Outpatient Medications   Medication Sig Dispense Refill    butalbital-APAP-caffeine (FIORICET) -40 MG CAPS per capsule Take 1 capsule by mouth 2 times daily as needed for Headaches 60 capsule 2    cyclobenzaprine (FLEXERIL) 10 MG tablet Take 1 tablet by mouth 2 times daily as needed for Muscle spasms (may cause drowsiness) 180 tablet 3    fluconazole (DIFLUCAN) 150 MG tablet TAKE 1 TABLET BY MOUTH EVERY OTHER DAY      PROMETHEGAN 12.5 MG suppository PLACE 1 SUPPOSITORY RECTALLY EVERY 6 HOURS AS NEEDED FOR NAUSEA      pantoprazole (PROTONIX) 40 MG tablet TAKE 1 TABLET BY MOUTH EVERY DAY      meloxicam (MOBIC) 15 MG tablet TAKE 1/2 TABLET BY MOUTH DAILY      estradiol (ESTRACE) 0.1 MG/GM vaginal cream APPLY 1 GM IN THE VAGINA WEEKLY      triamcinolone (KENALOG) 0.1 % cream APPLY TO AFFECTED AREA TWICE A DAY AS NEEDED FOR THREE WEEKS WITH FLARES      azelastine (ASTELIN) 0.1 % nasal spray by Nasal route      ZOLMitriptan (ZOMIG) 5 MG tablet TAKE 1 TABLET BY MOUTH AS NEEDED FOR MIGRAINE      omeprazole (PRILOSEC) 20 MG delayed release capsule TAKE 1 CAPSULE BY MOUTH TWICE A DAY      nystatin (MYCOSTATIN) 884274 UNIT/GM cream APPLY TO AFFECTED AREA 4 TIMES A DAY      hydrOXYzine (ATARAX) 25 MG tablet TAKE 1 TABLET BY MOUTH TWICE A DAY      acetaminophen (TYLENOL) 500 MG tablet Acetaminophen TYLENOL EXTRA STRENGTH 500 MG TABS take as directed on bottle as needed 2020/08/05 ACETAMINOPHEN [phone] Rosario Plunkett MD 08-  Lisa ([id number])      ondansetron (ZOFRAN-ODT) 4 MG disintegrating tablet Take 1 tablet by mouth 3 times daily as needed for Nausea or Vomiting 21 tablet 0    topiramate (TOPAMAX) 50 MG tablet Take 1 tablet by mouth 2 times daily Take two tablet in the morning and two tablets at hs. 180 tablet 3    Lasmiditan Succinate (REYVOW) 50 MG TABS Take 50 mg by mouth daily as needed (headache) 10 tablet 2    cyproheptadine (PERIACTIN) 4 MG tablet Take 4 mg by mouth 2 times daily      NONFORMULARY Take 8 mg by mouth 2 times daily CBD oil      Onabotulinumtoxin A (BOTOX) 200 units injection Inject 155 Units into the muscle every 30 days      hydrochlorothiazide (MICROZIDE) 12.5 MG capsule Take 12.5 mg by mouth daily.  gabapentin (NEURONTIN) 100 MG capsule Take 1 capsule by mouth 3 times daily for 30 days. 90 capsule 0     Current Facility-Administered Medications   Medication Dose Route Frequency Provider Last Rate Last Admin    bupivacaine (MARCAINE) 0.25 % injection 10 mg  4 mL IntraDERmal Once Solange Mason, DO           Allergies   Allergen Reactions    Aimovig [Erenumab-Aooe] Swelling    Avelox [Moxifloxacin]     Charcoal Activated [Activated Charcoal] Hives    Sulfa Antibiotics        Review of Systems:  No new weakness, paresthesia, incontinence of bowel or bladder, saddle anesthesia, falls or gait dysfunction. Otherwise, per HPI. Physical Exam:   Blood pressure 130/89, pulse 102, height 5' 2\" (1.575 m), weight 111 lb (50.3 kg), last menstrual period 04/10/2018. GENERAL: The patient is in no apparent distress. Body habitus is non-obese. MSK: There is no joint effusion, deformity, instability, swelling, erythema or warmth. AROM is full in the spine and extremities. Spinal curvatures are normal.   Trigger points present bilateral trapezius. Reproduction of headache with palpation of occipital notch. NEURO: Gait is normal. No focal sensorimotor deficit. Reflexes 2+ and symmetric in lower extremities. Impression:   1. Bilateral occipital neuralgia    2. Chronic migraine without aura without status migrainosus, not intractable        Plan:    Bilateral occipital nerve block today.     Medications Discontinued During This Encounter   Medication Reason    cyclobenzaprine (FLEXERIL) 10 MG tablet REORDER    butalbital-APAP-caffeine (FIORICET) -40 MG CAPS per capsule REORDER     Continue Botox. Continue massage therapy consider suboccipital release. Continue ice prn. The patient was educated about the diagnosis, prognosis, indications, risks and benefits of treatment. An opportunity to ask questions was given to the patient and questions were answered. The patient agreed to proceed with the recommended treatment as described above. Return for Botox injection. Thank you for allowing me to participate in the care of your patient. Abilio Valenzuela D.O., P.T. Board Certified Physical Medicine and Rehabilitation  Board Certified [facility]. [address] [facility]. [place] [facility]  [facility]. [facility]  Phone: [phone]  Fax: [phone]    10/27/2021    Chief Complaint   Patient presents with    Migraine     6 week follow up Botox       Last injection: 9/29/21  Taking anticoagulants/antiplatelets: No  Diabetic: No  Febrile/active infection: No    After explaining the indications, risks, benefits and alternatives of a Bilateral occipital nerve block, the patient agreed to proceed. A permit was signed and scanned into the media. The patient was placed in the seated position. The skin was prepared with alcohol. Using an aseptic, no touch technique, a 25 gauge, 5/8\" needle with 4 cc of Bupivicaine 0.25% was directed to the occipital notch at the point of maximal tenderness. After negative aspiration, 2 cc of the medication was injected on each side    Adequate hemostasis was obtained. The patient tolerated the procedure well and was educated in post injection care. The patient was monitored clinically and left the office without incident. Pain was reduced post-injection. Abilio Valenzuela D.O., P.T.   Board Certified Physical Medicine and Rehabilitation  Board Certified Electrodiagnostic Medicine

## 2021-10-27 RX ADMIN — BUPIVACAINE HYDROCHLORIDE 10 MG: 2.5 INJECTION, SOLUTION INFILTRATION; PERINEURAL at 14:21

## 2021-11-04 ENCOUNTER — TELEPHONE (OUTPATIENT)
Dept: PHYSICAL MEDICINE AND REHAB | Age: 56
End: 2021-11-04

## 2021-11-10 ENCOUNTER — OFFICE VISIT (OUTPATIENT)
Dept: PHYSICAL MEDICINE AND REHAB | Age: 56
End: 2021-11-10
Payer: COMMERCIAL

## 2021-11-10 VITALS
BODY MASS INDEX: 20.98 KG/M2 | DIASTOLIC BLOOD PRESSURE: 84 MMHG | HEART RATE: 99 BPM | WEIGHT: 114 LBS | HEIGHT: 62 IN | SYSTOLIC BLOOD PRESSURE: 123 MMHG

## 2021-11-10 DIAGNOSIS — M54.2 TRIGGER POINT OF NECK: Primary | ICD-10-CM

## 2021-11-10 DIAGNOSIS — G43.901 STATUS MIGRAINOSUS: ICD-10-CM

## 2021-11-10 PROCEDURE — 96372 THER/PROPH/DIAG INJ SC/IM: CPT | Performed by: PHYSICAL MEDICINE & REHABILITATION

## 2021-11-10 PROCEDURE — 20552 NJX 1/MLT TRIGGER POINT 1/2: CPT | Performed by: PHYSICAL MEDICINE & REHABILITATION

## 2021-11-10 PROCEDURE — 99214 OFFICE O/P EST MOD 30 MIN: CPT | Performed by: PHYSICAL MEDICINE & REHABILITATION

## 2021-11-10 RX ORDER — PROMETHAZINE HYDROCHLORIDE 25 MG/1
TABLET ORAL
COMMUNITY
Start: 2021-10-25 | End: 2022-06-13 | Stop reason: ALTCHOICE

## 2021-11-10 RX ORDER — KETOROLAC TROMETHAMINE 15 MG/ML
15 INJECTION, SOLUTION INTRAMUSCULAR; INTRAVENOUS ONCE
Status: COMPLETED | OUTPATIENT
Start: 2021-11-10 | End: 2021-11-10

## 2021-11-10 RX ORDER — TOPIRAMATE 50 MG/1
50 TABLET, FILM COATED ORAL 2 TIMES DAILY
Qty: 180 TABLET | Refills: 3 | Status: SHIPPED
Start: 2021-11-10 | End: 2022-05-25 | Stop reason: SDUPTHER

## 2021-11-10 RX ORDER — LIDOCAINE HYDROCHLORIDE 10 MG/ML
4 INJECTION, SOLUTION INFILTRATION; PERINEURAL ONCE
Status: COMPLETED | OUTPATIENT
Start: 2021-11-10 | End: 2021-11-10

## 2021-11-10 RX ORDER — AZELASTINE HYDROCHLORIDE, FLUTICASONE PROPIONATE 137; 50 UG/1; UG/1
SPRAY, METERED NASAL
COMMUNITY
Start: 2021-11-09 | End: 2022-06-13 | Stop reason: ALTCHOICE

## 2021-11-10 RX ORDER — GABAPENTIN 300 MG/1
300 CAPSULE ORAL 3 TIMES DAILY
Qty: 90 CAPSULE | Refills: 2 | Status: SHIPPED
Start: 2021-11-10 | End: 2022-02-25 | Stop reason: SDUPTHER

## 2021-11-10 RX ORDER — PROMETHAZINE HYDROCHLORIDE 25 MG/1
25 TABLET ORAL EVERY 6 HOURS PRN
Qty: 40 TABLET | Refills: 2 | Status: SHIPPED
Start: 2021-11-10 | End: 2022-01-14 | Stop reason: SDUPTHER

## 2021-11-10 RX ORDER — BROMPHENIRAMINE MALEATE, PSEUDOEPHEDRINE HYDROCHLORIDE, AND DEXTROMETHORPHAN HYDROBROMIDE 2; 30; 10 MG/5ML; MG/5ML; MG/5ML
SYRUP ORAL
COMMUNITY
Start: 2021-10-26 | End: 2022-01-20 | Stop reason: ALTCHOICE

## 2021-11-10 RX ORDER — ORPHENADRINE CITRATE 30 MG/ML
60 INJECTION INTRAMUSCULAR; INTRAVENOUS ONCE
Status: COMPLETED | OUTPATIENT
Start: 2021-11-10 | End: 2021-11-10

## 2021-11-10 RX ORDER — KETOROLAC TROMETHAMINE 10 MG/1
10 TABLET, FILM COATED ORAL EVERY 6 HOURS PRN
Qty: 12 TABLET | Refills: 0 | Status: SHIPPED
Start: 2021-11-10 | End: 2022-01-20 | Stop reason: ALTCHOICE

## 2021-11-10 RX ADMIN — LIDOCAINE HYDROCHLORIDE 4 ML: 10 INJECTION, SOLUTION INFILTRATION; PERINEURAL at 13:29

## 2021-11-10 RX ADMIN — ORPHENADRINE CITRATE 60 MG: 30 INJECTION INTRAMUSCULAR; INTRAVENOUS at 11:29

## 2021-11-10 RX ADMIN — KETOROLAC TROMETHAMINE 15 MG: 15 INJECTION, SOLUTION INTRAMUSCULAR; INTRAVENOUS at 11:28

## 2021-11-10 NOTE — PROGRESS NOTES
Debbi Strauss D.O. [place] [facility]   [address] [facility] [place]  Phone: [phone]  Fax: [phone]        11/10/21    Chief Complaint   Patient presents with    Migraine     Trigger point injections       HPI:  Balaji Way is a 55y.o. year old woman seen today in follow up regarding neck pain. Interval history: Since the last visit the patient states she had a deep tissue massage on 10/29/21 and she has had a migraine headache since then. She tried Zomig, Zofran, Tylenol, Mobic with no relief of the headache. Today, the pain is rated Pain Score:   5 where 0 is no pain and 10 is pain as bad as it can be. The pain is located in the upper cervical paraspinals,  does not radiate, and is described as tight, aching. This pain occurs all day. The symptoms have been worse since onset. Symptoms are exacerbated by laying down. Factors which relieve the pain include Toradol. Other associated symptoms include stiffness. Otherwise, the pain assessment has not changed since the last visit.      Past Medical History:   Diagnosis Date    Endometriosis     Headache     Hypertension     Osteoarthritis        Past Surgical History:   Procedure Laterality Date    ABDOMEN SURGERY      X2 Laproscopic for endometriosis    BREAST SURGERY      HYSTERECTOMY, VAGINAL      KNEE SURGERY Bilateral     X5 on each    NECK SURGERY      RHINOPLASTY      TONSILLECTOMY      WRIST GANGLION EXCISION      LEFT       Social History     Tobacco Use    Smoking status: Former Smoker     Packs/day: 0.25     Types: Cigarettes     Quit date: 2020     Years since quittin.2    Smokeless tobacco: Never Used   Vaping Use    Vaping Use: Never used   Substance Use Topics    Alcohol use: Yes     Comment: socially    Drug use: Never       Family History   Problem Relation Age of Onset    Osteoporosis Mother     Hypertension Father     Cancer Father        Current Outpatient Medications   Medication Sig Dispense Refill    topiramate (TOPAMAX) 50 MG tablet Take 1 tablet by mouth 2 times daily Take two tablet in the morning and two tablets at hs. 180 tablet 3    mupirocin (BACTROBAN) 2 % ointment       Azelastine-Fluticasone 137-50 MCG/ACT SUSP       promethazine (PHENERGAN) 25 MG tablet       brompheniramine-pseudoephedrine-DM 2-30-10 MG/5ML syrup       ketorolac (TORADOL) 10 MG tablet Take 1 tablet by mouth every 6 hours as needed for Pain (with food) 12 tablet 0    promethazine (PHENERGAN) 25 MG tablet Take 1 tablet by mouth every 6 hours as needed for Nausea 40 tablet 2    gabapentin (NEURONTIN) 300 MG capsule Take 1 capsule by mouth 3 times daily for 30 days.  90 capsule 2    butalbital-APAP-caffeine (FIORICET) -40 MG CAPS per capsule Take 1 capsule by mouth 2 times daily as needed for Headaches 60 capsule 2    cyclobenzaprine (FLEXERIL) 10 MG tablet Take 1 tablet by mouth 2 times daily as needed for Muscle spasms (may cause drowsiness) 180 tablet 3    fluconazole (DIFLUCAN) 150 MG tablet TAKE 1 TABLET BY MOUTH EVERY OTHER DAY      PROMETHEGAN 12.5 MG suppository PLACE 1 SUPPOSITORY RECTALLY EVERY 6 HOURS AS NEEDED FOR NAUSEA      pantoprazole (PROTONIX) 40 MG tablet TAKE 1 TABLET BY MOUTH EVERY DAY      meloxicam (MOBIC) 15 MG tablet TAKE 1/2 TABLET BY MOUTH DAILY      estradiol (ESTRACE) 0.1 MG/GM vaginal cream APPLY 1 GM IN THE VAGINA WEEKLY      triamcinolone (KENALOG) 0.1 % cream APPLY TO AFFECTED AREA TWICE A DAY AS NEEDED FOR THREE WEEKS WITH FLARES      azelastine (ASTELIN) 0.1 % nasal spray by Nasal route      ZOLMitriptan (ZOMIG) 5 MG tablet TAKE 1 TABLET BY MOUTH AS NEEDED FOR MIGRAINE      omeprazole (PRILOSEC) 20 MG delayed release capsule TAKE 1 CAPSULE BY MOUTH TWICE A DAY      nystatin (MYCOSTATIN) 738232 UNIT/GM cream APPLY TO AFFECTED AREA 4 TIMES A DAY      hydrOXYzine (ATARAX) 25 MG tablet TAKE 1 TABLET BY MOUTH TWICE A DAY  acetaminophen (TYLENOL) 500 MG tablet Acetaminophen TYLENOL EXTRA STRENGTH 500 MG TABS take as directed on bottle as needed 2020/08/05 ACETAMINOPHEN [phone] Liyah Walters MD 08-  [facility] ([id number])      ondansetron (ZOFRAN-ODT) 4 MG disintegrating tablet Take 1 tablet by mouth 3 times daily as needed for Nausea or Vomiting 21 tablet 0    Lasmiditan Succinate (REYVOW) 50 MG TABS Take 50 mg by mouth daily as needed (headache) 10 tablet 2    cyproheptadine (PERIACTIN) 4 MG tablet Take 4 mg by mouth 2 times daily      NONFORMULARY Take 8 mg by mouth 2 times daily CBD oil      Onabotulinumtoxin A (BOTOX) 200 units injection Inject 155 Units into the muscle every 30 days      hydrochlorothiazide (MICROZIDE) 12.5 MG capsule Take 12.5 mg by mouth daily.  gabapentin (NEURONTIN) 100 MG capsule Take 1 capsule by mouth 3 times daily for 30 days. 90 capsule 0     No current facility-administered medications for this visit. Allergies   Allergen Reactions    Aimovig [Erenumab-Aooe] Swelling    Avelox [Moxifloxacin]     Charcoal Activated [Activated Charcoal] Hives    Sulfa Antibiotics        Review of Systems:  No new weakness, paresthesia, incontinence of bowel or bladder, saddle anesthesia, falls or gait dysfunction. Otherwise, per HPI. Physical Exam:   Blood pressure 123/84, pulse 99, height 5' 2\" (1.575 m), weight 114 lb (51.7 kg), last menstrual period 04/10/2018. GENERAL: The patient is in no apparent distress. Body habitus is non-obese. MSK: There is no joint effusion, deformity, instability, swelling, erythema or warmth. AROM is full in the spine and extremities. Spinal curvatures are normal.   Trigger points present right trapezius and right medial scapular muscles. NEURO: Gait is normal. No focal sensorimotor deficit. Reflexes 2+ and symmetric in lower extremities. Impression:   1. Trigger point of neck    2.  Status migrainosus Plan:  Orders Placed This Encounter   Medications    topiramate (TOPAMAX) 50 MG tablet     Sig: Take 1 tablet by mouth 2 times daily Take two tablet in the morning and two tablets at hs. Dispense:  180 tablet     Refill:  3    ketorolac (TORADOL) injection 15 mg    ketorolac (TORADOL) 10 MG tablet     Sig: Take 1 tablet by mouth every 6 hours as needed for Pain (with food)     Dispense:  12 tablet     Refill:  0    orphenadrine (NORFLEX) injection 60 mg    promethazine (PHENERGAN) 25 MG tablet     Sig: Take 1 tablet by mouth every 6 hours as needed for Nausea     Dispense:  40 tablet     Refill:  2    gabapentin (NEURONTIN) 300 MG capsule     Sig: Take 1 capsule by mouth 3 times daily for 30 days. Dispense:  90 capsule     Refill:  2    lidocaine 1 % injection 4 mL       Medications Discontinued During This Encounter   Medication Reason    topiramate (TOPAMAX) 50 MG tablet REORDER   Trigger point injection today  Continue Botox. Continue ice prn. If not improving consider migraine infusion. The patient was educated about the diagnosis, prognosis, indications, risks and benefits of treatment. An opportunity to ask questions was given to the patient and questions were answered. The patient agreed to proceed with the recommended treatment as described above. Return if symptoms worsen or fail to improve. Thank you for allowing me to participate in the care of your patient. Filbert Runner, D.O., P.T. Board Certified Physical Medicine and Rehabilitation  Board Certified [address] [place] [address]. [facility] [address] [facility]. [place] [facility]  [address] [facility].  [address] [facility]  Phone: [phone]  Fax: [phone]    11/10/2021    Chief Complaint   Patient presents with    Migraine     Trigger point injections       Last injection: 9/29/21  Taking anticoagulants/antiplatelets: No  Diabetic: No  Febrile/active infection: No    After explaining the indications, risks, benefits and alternatives of a Right trapezius and right rhomboid trigger point, the patient agreed to proceed. A permit was signed and scanned into the chart. The patient was placed in the  seated position. The skin over the trigger point was prepared with alcohol. Using aseptic no touch technique, a 22 gauge, 1 1/2\" needle with 2 cc of Xylocaine 1% was directed into the trigger point. After negative aspiration, the medication was injected in a fanning out method. Adequate hemostasis was achieved and a bandage applied. The patient tolerated the procedure well and was educated in post injection care. The patient was clinically monitored after the injection and left the office without incident. There was post-injection reduction in pain and improved range of motion. Kassie Schneider D.O., P.T.   Board Certified Physical Medicine and Rehabilitation  Board Certified Electrodiagnostic Medicine    Administrations This Visit     ketorolac (TORADOL) injection 15 mg     Admin Date  11/10/2021  11:28 Action  Given Dose  15 mg Route  IntraMUSCular Site  Deltoid Right Administered By  Johnanna Shone, MA    Ordering Provider: [employer]     NDC: 8910-1797-55    Lot#: [id number]    : HOSPIRA    Patient Supplied?: No          lidocaine 1 % injection 4 mL     Admin Date  11/10/2021  13:29 Action  Given Dose  4 mL Route  Other Site   Administered By  Johnanna Shone, MA    Ordering Provider: [employer]     NDC: 2223-7391-93    Lot#: [id number]    : Rhina Root    Patient Supplied?: No          orphenadrine (NORFLEX) injection 60 mg     Admin Date  11/10/2021  11:29 Action  Given Dose  60 mg Route  IntraMUSCular Site  Deltoid Left Administered By  Johnanna Shone, MA    Ordering Provider: [employer]    [address]: 32239-551-68    Lot#: [id number]    : BIANCA    Patient Supplied?: No

## 2021-11-11 ENCOUNTER — TELEPHONE (OUTPATIENT)
Dept: PHYSICAL MEDICINE AND REHAB | Age: 56
End: 2021-11-11

## 2021-11-11 NOTE — TELEPHONE ENCOUNTER
Patient called in and stated that she had another migraine last night but it is gone now and she had taken zomig last night. She feels that she is headed in the right direction but wanted to let you know.

## 2021-11-12 RX ORDER — SODIUM CHLORIDE 0.9 % (FLUSH) 0.9 %
5-40 SYRINGE (ML) INJECTION PRN
Status: CANCELLED | OUTPATIENT
Start: 2021-11-12

## 2021-11-12 RX ORDER — DEXAMETHASONE SODIUM PHOSPHATE 4 MG/ML
4 INJECTION, SOLUTION INTRA-ARTICULAR; INTRALESIONAL; INTRAMUSCULAR; INTRAVENOUS; SOFT TISSUE ONCE
Status: CANCELLED
Start: 2021-11-12 | End: 2021-11-12

## 2021-11-12 RX ORDER — MAGNESIUM SULFATE 1 G/100ML
1000 INJECTION INTRAVENOUS ONCE
Status: CANCELLED | OUTPATIENT
Start: 2021-11-12

## 2021-11-12 RX ORDER — KETOROLAC TROMETHAMINE 30 MG/ML
30 INJECTION, SOLUTION INTRAMUSCULAR; INTRAVENOUS ONCE
Status: CANCELLED
Start: 2021-11-12 | End: 2021-11-12

## 2021-11-12 RX ORDER — DIHYDROERGOTAMINE MESYLATE 1 MG/ML
1 INJECTION, SOLUTION INTRAMUSCULAR; INTRAVENOUS; SUBCUTANEOUS
Status: CANCELLED
Start: 2021-11-12

## 2021-11-12 RX ORDER — ONDANSETRON 2 MG/ML
8 INJECTION INTRAMUSCULAR; INTRAVENOUS
Status: CANCELLED
Start: 2021-11-12

## 2021-11-12 RX ORDER — SODIUM CHLORIDE 9 MG/ML
25 INJECTION, SOLUTION INTRAVENOUS PRN
Status: CANCELLED | OUTPATIENT
Start: 2021-11-12

## 2021-11-12 RX ORDER — HEPARIN SODIUM (PORCINE) LOCK FLUSH IV SOLN 100 UNIT/ML 100 UNIT/ML
500 SOLUTION INTRAVENOUS PRN
Status: CANCELLED | OUTPATIENT
Start: 2021-11-12

## 2021-11-12 RX ORDER — DIHYDROERGOTAMINE MESYLATE 1 MG/ML
1 INJECTION, SOLUTION INTRAMUSCULAR; INTRAVENOUS; SUBCUTANEOUS ONCE
Status: CANCELLED
Start: 2021-11-12 | End: 2021-11-12

## 2021-11-12 RX ORDER — DIPHENHYDRAMINE HYDROCHLORIDE 50 MG/ML
50 INJECTION INTRAMUSCULAR; INTRAVENOUS ONCE
Status: CANCELLED
Start: 2021-11-12 | End: 2021-11-12

## 2021-11-14 ENCOUNTER — HOSPITAL ENCOUNTER (EMERGENCY)
Age: 56
Discharge: LEFT BEFORE DISCHARGE | End: 2021-11-14
Payer: COMMERCIAL

## 2021-11-14 ENCOUNTER — HOSPITAL ENCOUNTER (EMERGENCY)
Age: 56
Discharge: ANOTHER HOSPITAL | End: 2021-11-14

## 2021-11-14 VITALS
HEIGHT: 63 IN | TEMPERATURE: 97.2 F | BODY MASS INDEX: 19.84 KG/M2 | WEIGHT: 112 LBS | RESPIRATION RATE: 18 BRPM | OXYGEN SATURATION: 95 % | HEART RATE: 95 BPM

## 2021-11-14 VITALS
TEMPERATURE: 96.6 F | HEART RATE: 92 BPM | OXYGEN SATURATION: 100 % | RESPIRATION RATE: 16 BRPM | SYSTOLIC BLOOD PRESSURE: 142 MMHG | DIASTOLIC BLOOD PRESSURE: 81 MMHG

## 2021-11-14 LAB
ALBUMIN SERPL-MCNC: 4.5 G/DL (ref 3.5–5.2)
ALP BLD-CCNC: 85 U/L (ref 35–104)
ALT SERPL-CCNC: 29 U/L (ref 0–32)
ANION GAP SERPL CALCULATED.3IONS-SCNC: 14 MMOL/L (ref 7–16)
AST SERPL-CCNC: 26 U/L (ref 0–31)
BASOPHILS ABSOLUTE: 0.03 E9/L (ref 0–0.2)
BASOPHILS RELATIVE PERCENT: 0.3 % (ref 0–2)
BILIRUB SERPL-MCNC: <0.2 MG/DL (ref 0–1.2)
BUN BLDV-MCNC: 10 MG/DL (ref 6–20)
CALCIUM SERPL-MCNC: 9.6 MG/DL (ref 8.6–10.2)
CHLORIDE BLD-SCNC: 100 MMOL/L (ref 98–107)
CO2: 26 MMOL/L (ref 22–29)
CREAT SERPL-MCNC: 0.8 MG/DL (ref 0.5–1)
EOSINOPHILS ABSOLUTE: 0.1 E9/L (ref 0.05–0.5)
EOSINOPHILS RELATIVE PERCENT: 1.1 % (ref 0–6)
GFR AFRICAN AMERICAN: >60
GFR NON-AFRICAN AMERICAN: >60 ML/MIN/1.73
GLUCOSE BLD-MCNC: 88 MG/DL (ref 74–99)
HCT VFR BLD CALC: 41.6 % (ref 34–48)
HEMOGLOBIN: 13.6 G/DL (ref 11.5–15.5)
IMMATURE GRANULOCYTES #: 0.03 E9/L
IMMATURE GRANULOCYTES %: 0.3 % (ref 0–5)
LYMPHOCYTES ABSOLUTE: 2.93 E9/L (ref 1.5–4)
LYMPHOCYTES RELATIVE PERCENT: 33.5 % (ref 20–42)
MCH RBC QN AUTO: 30.7 PG (ref 26–35)
MCHC RBC AUTO-ENTMCNC: 32.7 % (ref 32–34.5)
MCV RBC AUTO: 93.9 FL (ref 80–99.9)
MONOCYTES ABSOLUTE: 0.84 E9/L (ref 0.1–0.95)
MONOCYTES RELATIVE PERCENT: 9.6 % (ref 2–12)
NEUTROPHILS ABSOLUTE: 4.82 E9/L (ref 1.8–7.3)
NEUTROPHILS RELATIVE PERCENT: 55.2 % (ref 43–80)
PDW BLD-RTO: 12.7 FL (ref 11.5–15)
PLATELET # BLD: 328 E9/L (ref 130–450)
PMV BLD AUTO: 9.1 FL (ref 7–12)
POTASSIUM REFLEX MAGNESIUM: 3.6 MMOL/L (ref 3.5–5)
RBC # BLD: 4.43 E12/L (ref 3.5–5.5)
SODIUM BLD-SCNC: 140 MMOL/L (ref 132–146)
TOTAL PROTEIN: 7 G/DL (ref 6.4–8.3)
WBC # BLD: 8.8 E9/L (ref 4.5–11.5)

## 2021-11-14 PROCEDURE — 80053 COMPREHEN METABOLIC PANEL: CPT

## 2021-11-14 PROCEDURE — 85025 COMPLETE CBC W/AUTO DIFF WBC: CPT

## 2021-11-14 PROCEDURE — 4500000002 HC ER NO CHARGE

## 2021-11-14 ASSESSMENT — PAIN DESCRIPTION - DESCRIPTORS: DESCRIPTORS: HEADACHE

## 2021-11-14 ASSESSMENT — PAIN SCALES - GENERAL: PAINLEVEL_OUTOF10: 8

## 2021-11-14 NOTE — ED TRIAGE NOTES
FIRST PROVIDER CONTACT ASSESSMENT NOTE           Department of Emergency Medicine                 First Provider Note            21  9:07 AM EST    Date of Encounter: No admission date for patient encounter. Patient Name: Long Burr  : 1965  MRN: [id number]    Chief Complaint: Headache (has hx migraines)      History of Present Illness:   Long Burr is a 55 y.o. female who presents to the ED for migraine daily which started . States that this started after a massage at [employer]. States that she has been taking Zomig twice daily since that time. History og migraines. She has had 2-3 episodes of vomiting during this period. States that she is set up to get an infusion for migraine tomorrow. She has not seen neurology since 2017. Headaches have been managed by Dr. Celi Kee, physiatrist.  She has taken tylenol, toradol, benadryl, phenergan at home without relief. Denies thunderclap headache. Denies vision changes. Focused Physical Exam:  VS:    ED Triage Vitals [21 0718]   BP Temp Temp Source Pulse Resp SpO2 Height Weight   130/89 96.6 °F (35.9 °C) Temporal 104 18 99 % -- --        Physical Ex: Constitutional: Alert and non-toxic. Medical History:  has a past medical history of Endometriosis, Headache, Hypertension, and Osteoarthritis. Surgical History:  has a past surgical history that includes knee surgery (Bilateral); rhinoplasty; Breast surgery; Abdomen surgery; Wrist ganglion excision; Hysterectomy, vaginal; Tonsillectomy; and Neck surgery. Social History:  reports that she quit smoking about 14 months ago. Her smoking use included cigarettes. She smoked 0.25 packs per day. She has never used smokeless tobacco. She reports current alcohol use. She reports that she does not use drugs. Family History: family history includes Cancer in her father; Hypertension in her father; Osteoporosis in her mother.     Allergies: Aimovig [erenumab-aooe], Avelox [moxifloxacin], Charcoal activated [activated charcoal], and Sulfa antibiotics     Initial Plan of Care: Initiate Treatment-Testing, Proceed toTreatment Area When Bed Available for ED Attending/MLP to Continue Care      ---END OF FIRST PROVIDER CONTACT ASSESSMENT NOTE---  Electronically signed by AUREA Connelly CNP   DD: 11/14/21

## 2021-11-15 ENCOUNTER — HOSPITAL ENCOUNTER (OUTPATIENT)
Dept: INFUSION THERAPY | Age: 56
Setting detail: INFUSION SERIES
Discharge: HOME | End: 2021-11-15
Payer: COMMERCIAL

## 2021-11-15 VITALS
HEART RATE: 85 BPM | TEMPERATURE: 97 F | DIASTOLIC BLOOD PRESSURE: 82 MMHG | OXYGEN SATURATION: 99 % | SYSTOLIC BLOOD PRESSURE: 139 MMHG | RESPIRATION RATE: 16 BRPM

## 2021-11-15 DIAGNOSIS — G43.709 CHRONIC MIGRAINE WITHOUT AURA WITHOUT STATUS MIGRAINOSUS, NOT INTRACTABLE: Primary | ICD-10-CM

## 2021-11-15 LAB
ALBUMIN SERPL-MCNC: 4.4 G/DL (ref 3.5–5.2)
ALP BLD-CCNC: 79 U/L (ref 35–104)
ALT SERPL-CCNC: 23 U/L (ref 0–32)
ANION GAP SERPL CALCULATED.3IONS-SCNC: 11 MMOL/L (ref 7–16)
AST SERPL-CCNC: 19 U/L (ref 0–31)
BILIRUB SERPL-MCNC: <0.2 MG/DL (ref 0–1.2)
BUN BLDV-MCNC: 9 MG/DL (ref 6–20)
CALCIUM SERPL-MCNC: 8.7 MG/DL (ref 8.6–10.2)
CHLORIDE BLD-SCNC: 93 MMOL/L (ref 98–107)
CO2: 27 MMOL/L (ref 22–29)
CREAT SERPL-MCNC: 0.7 MG/DL (ref 0.5–1)
GFR AFRICAN AMERICAN: >60
GFR NON-AFRICAN AMERICAN: >60 ML/MIN/1.73
GLUCOSE BLD-MCNC: 111 MG/DL (ref 74–99)
POTASSIUM SERPL-SCNC: 3.1 MMOL/L (ref 3.5–5)
SODIUM BLD-SCNC: 131 MMOL/L (ref 132–146)
TOTAL PROTEIN: 6.5 G/DL (ref 6.4–8.3)

## 2021-11-15 PROCEDURE — 6360000002 HC RX W HCPCS: Performed by: PHYSICAL MEDICINE & REHABILITATION

## 2021-11-15 PROCEDURE — 80053 COMPREHEN METABOLIC PANEL: CPT

## 2021-11-15 PROCEDURE — 36415 COLL VENOUS BLD VENIPUNCTURE: CPT

## 2021-11-15 PROCEDURE — 2580000003 HC RX 258: Performed by: PHYSICAL MEDICINE & REHABILITATION

## 2021-11-15 PROCEDURE — 96365 THER/PROPH/DIAG IV INF INIT: CPT

## 2021-11-15 PROCEDURE — 2500000003 HC RX 250 WO HCPCS: Performed by: PHYSICAL MEDICINE & REHABILITATION

## 2021-11-15 PROCEDURE — 96367 TX/PROPH/DG ADDL SEQ IV INF: CPT

## 2021-11-15 PROCEDURE — 96375 TX/PRO/DX INJ NEW DRUG ADDON: CPT

## 2021-11-15 RX ORDER — DIHYDROERGOTAMINE MESYLATE 1 MG/ML
1 INJECTION, SOLUTION INTRAMUSCULAR; INTRAVENOUS; SUBCUTANEOUS ONCE
Status: COMPLETED | OUTPATIENT
Start: 2021-11-15 | End: 2021-11-15

## 2021-11-15 RX ORDER — DIPHENHYDRAMINE HYDROCHLORIDE 50 MG/ML
50 INJECTION INTRAMUSCULAR; INTRAVENOUS ONCE
Status: DISCONTINUED | OUTPATIENT
Start: 2021-11-15 | End: 2021-11-16 | Stop reason: HOSPADM

## 2021-11-15 RX ORDER — HEPARIN SODIUM (PORCINE) LOCK FLUSH IV SOLN 100 UNIT/ML 100 UNIT/ML
500 SOLUTION INTRAVENOUS PRN
Status: DISCONTINUED | OUTPATIENT
Start: 2021-11-15 | End: 2021-11-16 | Stop reason: HOSPADM

## 2021-11-15 RX ORDER — DIPHENHYDRAMINE HYDROCHLORIDE 50 MG/ML
50 INJECTION INTRAMUSCULAR; INTRAVENOUS ONCE
Status: CANCELLED
Start: 2021-11-16 | End: 2021-11-16

## 2021-11-15 RX ORDER — DIHYDROERGOTAMINE MESYLATE 1 MG/ML
1 INJECTION, SOLUTION INTRAMUSCULAR; INTRAVENOUS; SUBCUTANEOUS
Status: CANCELLED
Start: 2021-11-16

## 2021-11-15 RX ORDER — ONDANSETRON 2 MG/ML
8 INJECTION INTRAMUSCULAR; INTRAVENOUS
Status: COMPLETED | OUTPATIENT
Start: 2021-11-15 | End: 2021-11-15

## 2021-11-15 RX ORDER — DIHYDROERGOTAMINE MESYLATE 1 MG/ML
1 INJECTION, SOLUTION INTRAMUSCULAR; INTRAVENOUS; SUBCUTANEOUS
Status: ACTIVE | OUTPATIENT
Start: 2021-11-15 | End: 2021-11-15

## 2021-11-15 RX ORDER — KETOROLAC TROMETHAMINE 30 MG/ML
30 INJECTION, SOLUTION INTRAMUSCULAR; INTRAVENOUS ONCE
Status: COMPLETED | OUTPATIENT
Start: 2021-11-15 | End: 2021-11-15

## 2021-11-15 RX ORDER — SODIUM CHLORIDE 0.9 % (FLUSH) 0.9 %
5-40 SYRINGE (ML) INJECTION PRN
Status: CANCELLED | OUTPATIENT
Start: 2021-11-16

## 2021-11-15 RX ORDER — DEXAMETHASONE SODIUM PHOSPHATE 4 MG/ML
4 INJECTION, SOLUTION INTRA-ARTICULAR; INTRALESIONAL; INTRAMUSCULAR; INTRAVENOUS; SOFT TISSUE ONCE
Status: CANCELLED
Start: 2021-11-16 | End: 2021-11-16

## 2021-11-15 RX ORDER — SODIUM CHLORIDE 0.9 % (FLUSH) 0.9 %
5-40 SYRINGE (ML) INJECTION PRN
Status: DISCONTINUED | OUTPATIENT
Start: 2021-11-15 | End: 2021-11-16 | Stop reason: HOSPADM

## 2021-11-15 RX ORDER — DIHYDROERGOTAMINE MESYLATE 1 MG/ML
1 INJECTION, SOLUTION INTRAMUSCULAR; INTRAVENOUS; SUBCUTANEOUS ONCE
Status: CANCELLED
Start: 2021-11-16 | End: 2021-11-16

## 2021-11-15 RX ORDER — ONDANSETRON 2 MG/ML
8 INJECTION INTRAMUSCULAR; INTRAVENOUS
Status: CANCELLED
Start: 2021-11-16

## 2021-11-15 RX ORDER — HEPARIN SODIUM (PORCINE) LOCK FLUSH IV SOLN 100 UNIT/ML 100 UNIT/ML
500 SOLUTION INTRAVENOUS PRN
Status: CANCELLED | OUTPATIENT
Start: 2021-11-16

## 2021-11-15 RX ORDER — DEXAMETHASONE SODIUM PHOSPHATE 4 MG/ML
4 INJECTION, SOLUTION INTRA-ARTICULAR; INTRALESIONAL; INTRAMUSCULAR; INTRAVENOUS; SOFT TISSUE ONCE
Status: COMPLETED | OUTPATIENT
Start: 2021-11-15 | End: 2021-11-15

## 2021-11-15 RX ORDER — KETOROLAC TROMETHAMINE 30 MG/ML
30 INJECTION, SOLUTION INTRAMUSCULAR; INTRAVENOUS ONCE
Status: CANCELLED
Start: 2021-11-16 | End: 2021-11-16

## 2021-11-15 RX ORDER — MAGNESIUM SULFATE 1 G/100ML
1000 INJECTION INTRAVENOUS ONCE
Status: CANCELLED | OUTPATIENT
Start: 2021-11-16

## 2021-11-15 RX ORDER — SODIUM CHLORIDE 9 MG/ML
25 INJECTION, SOLUTION INTRAVENOUS PRN
Status: CANCELLED | OUTPATIENT
Start: 2021-11-16

## 2021-11-15 RX ORDER — MAGNESIUM SULFATE 1 G/100ML
1000 INJECTION INTRAVENOUS ONCE
Status: COMPLETED | OUTPATIENT
Start: 2021-11-15 | End: 2021-11-15

## 2021-11-15 RX ADMIN — MAGNESIUM SULFATE HEPTAHYDRATE 1000 MG: 1 INJECTION, SOLUTION INTRAVENOUS at 11:30

## 2021-11-15 RX ADMIN — Medication 10 ML: at 12:30

## 2021-11-15 RX ADMIN — Medication 10 ML: at 11:30

## 2021-11-15 RX ADMIN — DIHYDROERGOTAMINE MESYLATE 1 MG: 1 INJECTION, SOLUTION INTRAMUSCULAR; INTRAVENOUS; SUBCUTANEOUS at 10:54

## 2021-11-15 RX ADMIN — Medication 10 ML: at 10:34

## 2021-11-15 RX ADMIN — KETOROLAC TROMETHAMINE 30 MG: 30 INJECTION, SOLUTION INTRAMUSCULAR; INTRAVENOUS at 12:38

## 2021-11-15 RX ADMIN — ONDANSETRON 8 MG: 2 INJECTION INTRAMUSCULAR; INTRAVENOUS at 10:42

## 2021-11-15 RX ADMIN — DEXAMETHASONE SODIUM PHOSPHATE 4 MG: 4 INJECTION, SOLUTION INTRAMUSCULAR; INTRAVENOUS at 10:48

## 2021-11-15 RX ADMIN — Medication 10 ML: at 11:12

## 2021-11-15 RX ADMIN — Medication 10 ML: at 13:34

## 2021-11-15 RX ADMIN — Medication 10 ML: at 10:44

## 2021-11-15 RX ADMIN — VALPROATE SODIUM 500 MG: 100 INJECTION, SOLUTION INTRAVENOUS at 13:13

## 2021-11-15 RX ADMIN — Medication 10 ML: at 10:47

## 2021-11-15 RX ADMIN — HEPARIN SODIUM (PORCINE) LOCK FLUSH IV SOLN 100 UNIT/ML 500 UNITS: 100 SOLUTION at 13:34

## 2021-11-15 ASSESSMENT — PAIN SCALES - GENERAL
PAINLEVEL_OUTOF10: 3
PAINLEVEL_OUTOF10: 6

## 2021-11-15 NOTE — PROGRESS NOTES
IV site left in for patient to avoid additional IV starts. Site securely wrapped with Kerlex. Patient instructed to keep dry and to call if any swelling, bleeding, pain/discomfort occurs.

## 2021-11-16 ENCOUNTER — TELEPHONE (OUTPATIENT)
Dept: PHYSICAL MEDICINE AND REHAB | Age: 56
End: 2021-11-16

## 2021-11-16 ENCOUNTER — HOSPITAL ENCOUNTER (OUTPATIENT)
Dept: INFUSION THERAPY | Age: 56
Setting detail: INFUSION SERIES
Discharge: HOME | End: 2021-11-16
Payer: COMMERCIAL

## 2021-11-16 DIAGNOSIS — G43.709 CHRONIC MIGRAINE WITHOUT AURA WITHOUT STATUS MIGRAINOSUS, NOT INTRACTABLE: Primary | ICD-10-CM

## 2021-11-16 PROCEDURE — 2580000003 HC RX 258: Performed by: PHYSICAL MEDICINE & REHABILITATION

## 2021-11-16 PROCEDURE — 6360000002 HC RX W HCPCS: Performed by: PHYSICAL MEDICINE & REHABILITATION

## 2021-11-16 PROCEDURE — 2500000003 HC RX 250 WO HCPCS: Performed by: PHYSICAL MEDICINE & REHABILITATION

## 2021-11-16 PROCEDURE — 96367 TX/PROPH/DG ADDL SEQ IV INF: CPT

## 2021-11-16 PROCEDURE — 96365 THER/PROPH/DIAG IV INF INIT: CPT

## 2021-11-16 PROCEDURE — 96375 TX/PRO/DX INJ NEW DRUG ADDON: CPT

## 2021-11-16 RX ORDER — MAGNESIUM SULFATE 1 G/100ML
1000 INJECTION INTRAVENOUS ONCE
Status: CANCELLED | OUTPATIENT
Start: 2021-11-17

## 2021-11-16 RX ORDER — DIHYDROERGOTAMINE MESYLATE 1 MG/ML
1 INJECTION, SOLUTION INTRAMUSCULAR; INTRAVENOUS; SUBCUTANEOUS ONCE
Status: COMPLETED | OUTPATIENT
Start: 2021-11-16 | End: 2021-11-16

## 2021-11-16 RX ORDER — SODIUM CHLORIDE 9 MG/ML
25 INJECTION, SOLUTION INTRAVENOUS PRN
Status: CANCELLED | OUTPATIENT
Start: 2021-11-17

## 2021-11-16 RX ORDER — ONDANSETRON 2 MG/ML
8 INJECTION INTRAMUSCULAR; INTRAVENOUS
Status: COMPLETED | OUTPATIENT
Start: 2021-11-16 | End: 2021-11-16

## 2021-11-16 RX ORDER — DIPHENHYDRAMINE HYDROCHLORIDE 50 MG/ML
50 INJECTION INTRAMUSCULAR; INTRAVENOUS ONCE
Status: CANCELLED
Start: 2021-11-17 | End: 2021-11-17

## 2021-11-16 RX ORDER — SODIUM CHLORIDE 0.9 % (FLUSH) 0.9 %
5-40 SYRINGE (ML) INJECTION PRN
Status: DISCONTINUED | OUTPATIENT
Start: 2021-11-16 | End: 2021-11-17 | Stop reason: HOSPADM

## 2021-11-16 RX ORDER — SODIUM CHLORIDE 0.9 % (FLUSH) 0.9 %
5-40 SYRINGE (ML) INJECTION PRN
Status: CANCELLED | OUTPATIENT
Start: 2021-11-17

## 2021-11-16 RX ORDER — DEXAMETHASONE SODIUM PHOSPHATE 4 MG/ML
4 INJECTION, SOLUTION INTRA-ARTICULAR; INTRALESIONAL; INTRAMUSCULAR; INTRAVENOUS; SOFT TISSUE ONCE
Status: CANCELLED
Start: 2021-11-17 | End: 2021-11-17

## 2021-11-16 RX ORDER — HEPARIN SODIUM (PORCINE) LOCK FLUSH IV SOLN 100 UNIT/ML 100 UNIT/ML
500 SOLUTION INTRAVENOUS PRN
Status: CANCELLED | OUTPATIENT
Start: 2021-11-17

## 2021-11-16 RX ORDER — ONDANSETRON 2 MG/ML
8 INJECTION INTRAMUSCULAR; INTRAVENOUS
Status: CANCELLED
Start: 2021-11-17

## 2021-11-16 RX ORDER — DIHYDROERGOTAMINE MESYLATE 1 MG/ML
1 INJECTION, SOLUTION INTRAMUSCULAR; INTRAVENOUS; SUBCUTANEOUS ONCE
Status: CANCELLED
Start: 2021-11-17 | End: 2021-11-17

## 2021-11-16 RX ORDER — KETOROLAC TROMETHAMINE 30 MG/ML
30 INJECTION, SOLUTION INTRAMUSCULAR; INTRAVENOUS ONCE
Status: CANCELLED
Start: 2021-11-17 | End: 2021-11-17

## 2021-11-16 RX ORDER — MAGNESIUM SULFATE 1 G/100ML
1000 INJECTION INTRAVENOUS ONCE
Status: COMPLETED | OUTPATIENT
Start: 2021-11-16 | End: 2021-11-16

## 2021-11-16 RX ORDER — DEXAMETHASONE SODIUM PHOSPHATE 4 MG/ML
4 INJECTION, SOLUTION INTRA-ARTICULAR; INTRALESIONAL; INTRAMUSCULAR; INTRAVENOUS; SOFT TISSUE ONCE
Status: COMPLETED | OUTPATIENT
Start: 2021-11-16 | End: 2021-11-16

## 2021-11-16 RX ORDER — HEPARIN SODIUM (PORCINE) LOCK FLUSH IV SOLN 100 UNIT/ML 100 UNIT/ML
500 SOLUTION INTRAVENOUS PRN
Status: DISCONTINUED | OUTPATIENT
Start: 2021-11-16 | End: 2021-11-17 | Stop reason: HOSPADM

## 2021-11-16 RX ORDER — DIPHENHYDRAMINE HYDROCHLORIDE 50 MG/ML
50 INJECTION INTRAMUSCULAR; INTRAVENOUS ONCE
Status: COMPLETED | OUTPATIENT
Start: 2021-11-16 | End: 2021-11-16

## 2021-11-16 RX ORDER — DIHYDROERGOTAMINE MESYLATE 1 MG/ML
1 INJECTION, SOLUTION INTRAMUSCULAR; INTRAVENOUS; SUBCUTANEOUS
Status: CANCELLED
Start: 2021-11-17

## 2021-11-16 RX ORDER — KETOROLAC TROMETHAMINE 30 MG/ML
30 INJECTION, SOLUTION INTRAMUSCULAR; INTRAVENOUS ONCE
Status: COMPLETED | OUTPATIENT
Start: 2021-11-16 | End: 2021-11-16

## 2021-11-16 RX ADMIN — DIPHENHYDRAMINE HYDROCHLORIDE 50 MG: 50 INJECTION, SOLUTION INTRAMUSCULAR; INTRAVENOUS at 10:18

## 2021-11-16 RX ADMIN — DIHYDROERGOTAMINE MESYLATE 1 MG: 1 INJECTION, SOLUTION INTRAMUSCULAR; INTRAVENOUS; SUBCUTANEOUS at 10:35

## 2021-11-16 RX ADMIN — Medication 10 ML: at 12:05

## 2021-11-16 RX ADMIN — ONDANSETRON 8 MG: 2 INJECTION INTRAMUSCULAR; INTRAVENOUS at 10:24

## 2021-11-16 RX ADMIN — Medication 10 ML: at 10:18

## 2021-11-16 RX ADMIN — MAGNESIUM SULFATE HEPTAHYDRATE 1000 MG: 1 INJECTION, SOLUTION INTRAVENOUS at 10:55

## 2021-11-16 RX ADMIN — Medication 10 ML: at 10:09

## 2021-11-16 RX ADMIN — HEPARIN SODIUM (PORCINE) LOCK FLUSH IV SOLN 100 UNIT/ML 500 UNITS: 100 SOLUTION at 13:08

## 2021-11-16 RX ADMIN — Medication 10 ML: at 13:07

## 2021-11-16 RX ADMIN — Medication 10 ML: at 12:14

## 2021-11-16 RX ADMIN — DEXAMETHASONE SODIUM PHOSPHATE 4 MG: 4 INJECTION, SOLUTION INTRAMUSCULAR; INTRAVENOUS at 10:29

## 2021-11-16 RX ADMIN — KETOROLAC TROMETHAMINE 30 MG: 30 INJECTION, SOLUTION INTRAMUSCULAR at 12:13

## 2021-11-16 RX ADMIN — Medication 10 ML: at 10:24

## 2021-11-16 RX ADMIN — VALPROATE SODIUM 500 MG: 100 INJECTION, SOLUTION INTRAVENOUS at 12:41

## 2021-11-16 ASSESSMENT — PAIN SCALES - GENERAL
PAINLEVEL_OUTOF10: 0
PAINLEVEL_OUTOF10: 1
PAINLEVEL_OUTOF10: 1

## 2021-11-16 NOTE — TELEPHONE ENCOUNTER
----- Message from Terris Hamman, DO sent at 11/16/2021  9:23 AM EST -----  I did not order this. It needs to get back to the ordering provider.    ----- Message -----  From: Bishnu Alegria Incoming Results From [employer]  Sent: 11/15/2021  11:18 AM EST  To: Terris Hamman, DO

## 2021-11-16 NOTE — TELEPHONE ENCOUNTER
Called and spoke with Viraj Coulter from [facility] [address] [place] [address] to make sure this lab work gets sent to correct physician. She stated she will send it to the correct physician now.

## 2021-11-17 ENCOUNTER — HOSPITAL ENCOUNTER (OUTPATIENT)
Dept: INFUSION THERAPY | Age: 56
Setting detail: INFUSION SERIES
Discharge: HOME | End: 2021-11-17
Payer: COMMERCIAL

## 2021-11-17 VITALS
TEMPERATURE: 97.5 F | RESPIRATION RATE: 24 BRPM | HEART RATE: 94 BPM | OXYGEN SATURATION: 97 % | SYSTOLIC BLOOD PRESSURE: 127 MMHG | DIASTOLIC BLOOD PRESSURE: 76 MMHG

## 2021-11-17 DIAGNOSIS — G43.709 CHRONIC MIGRAINE WITHOUT AURA WITHOUT STATUS MIGRAINOSUS, NOT INTRACTABLE: Primary | ICD-10-CM

## 2021-11-17 PROCEDURE — 2580000003 HC RX 258: Performed by: PHYSICAL MEDICINE & REHABILITATION

## 2021-11-17 PROCEDURE — 6360000002 HC RX W HCPCS: Performed by: PHYSICAL MEDICINE & REHABILITATION

## 2021-11-17 PROCEDURE — 96375 TX/PRO/DX INJ NEW DRUG ADDON: CPT

## 2021-11-17 PROCEDURE — 96365 THER/PROPH/DIAG IV INF INIT: CPT

## 2021-11-17 RX ORDER — KETOROLAC TROMETHAMINE 30 MG/ML
30 INJECTION, SOLUTION INTRAMUSCULAR; INTRAVENOUS ONCE
Start: 2021-11-18 | End: 2021-11-18

## 2021-11-17 RX ORDER — SODIUM CHLORIDE 0.9 % (FLUSH) 0.9 %
5-40 SYRINGE (ML) INJECTION PRN
OUTPATIENT
Start: 2021-11-18

## 2021-11-17 RX ORDER — DEXAMETHASONE SODIUM PHOSPHATE 4 MG/ML
4 INJECTION, SOLUTION INTRA-ARTICULAR; INTRALESIONAL; INTRAMUSCULAR; INTRAVENOUS; SOFT TISSUE ONCE
Status: COMPLETED | OUTPATIENT
Start: 2021-11-17 | End: 2021-11-17

## 2021-11-17 RX ORDER — SODIUM CHLORIDE 9 MG/ML
25 INJECTION, SOLUTION INTRAVENOUS PRN
OUTPATIENT
Start: 2021-11-18

## 2021-11-17 RX ORDER — DIHYDROERGOTAMINE MESYLATE 1 MG/ML
1 INJECTION, SOLUTION INTRAMUSCULAR; INTRAVENOUS; SUBCUTANEOUS ONCE
Status: CANCELLED
Start: 2021-11-18 | End: 2021-11-18

## 2021-11-17 RX ORDER — MAGNESIUM SULFATE 1 G/100ML
1000 INJECTION INTRAVENOUS ONCE
Status: COMPLETED | OUTPATIENT
Start: 2021-11-17 | End: 2021-11-17

## 2021-11-17 RX ORDER — ONDANSETRON 2 MG/ML
8 INJECTION INTRAMUSCULAR; INTRAVENOUS
Status: COMPLETED | OUTPATIENT
Start: 2021-11-17 | End: 2021-11-17

## 2021-11-17 RX ORDER — DIHYDROERGOTAMINE MESYLATE 1 MG/ML
1 INJECTION, SOLUTION INTRAMUSCULAR; INTRAVENOUS; SUBCUTANEOUS ONCE
Status: DISCONTINUED | OUTPATIENT
Start: 2021-11-17 | End: 2021-11-18 | Stop reason: HOSPADM

## 2021-11-17 RX ORDER — DIHYDROERGOTAMINE MESYLATE 1 MG/ML
1 INJECTION, SOLUTION INTRAMUSCULAR; INTRAVENOUS; SUBCUTANEOUS
Start: 2021-11-18

## 2021-11-17 RX ORDER — MAGNESIUM SULFATE 1 G/100ML
1000 INJECTION INTRAVENOUS ONCE
Status: CANCELLED | OUTPATIENT
Start: 2021-11-18

## 2021-11-17 RX ORDER — DIPHENHYDRAMINE HYDROCHLORIDE 50 MG/ML
50 INJECTION INTRAMUSCULAR; INTRAVENOUS ONCE
Status: COMPLETED | OUTPATIENT
Start: 2021-11-17 | End: 2021-11-17

## 2021-11-17 RX ORDER — HEPARIN SODIUM (PORCINE) LOCK FLUSH IV SOLN 100 UNIT/ML 100 UNIT/ML
500 SOLUTION INTRAVENOUS PRN
OUTPATIENT
Start: 2021-11-18

## 2021-11-17 RX ORDER — ONDANSETRON 2 MG/ML
8 INJECTION INTRAMUSCULAR; INTRAVENOUS
Status: CANCELLED
Start: 2021-11-18

## 2021-11-17 RX ORDER — DIPHENHYDRAMINE HYDROCHLORIDE 50 MG/ML
50 INJECTION INTRAMUSCULAR; INTRAVENOUS ONCE
Status: CANCELLED
Start: 2021-11-18 | End: 2021-11-18

## 2021-11-17 RX ORDER — DEXAMETHASONE SODIUM PHOSPHATE 4 MG/ML
4 INJECTION, SOLUTION INTRA-ARTICULAR; INTRALESIONAL; INTRAMUSCULAR; INTRAVENOUS; SOFT TISSUE ONCE
Status: CANCELLED
Start: 2021-11-18 | End: 2021-11-18

## 2021-11-17 RX ORDER — SODIUM CHLORIDE 0.9 % (FLUSH) 0.9 %
5-40 SYRINGE (ML) INJECTION PRN
Status: DISCONTINUED | OUTPATIENT
Start: 2021-11-17 | End: 2021-11-18 | Stop reason: HOSPADM

## 2021-11-17 RX ORDER — DIHYDROERGOTAMINE MESYLATE 1 MG/ML
1 INJECTION, SOLUTION INTRAMUSCULAR; INTRAVENOUS; SUBCUTANEOUS
Status: ACTIVE | OUTPATIENT
Start: 2021-11-17 | End: 2021-11-17

## 2021-11-17 RX ADMIN — SODIUM CHLORIDE, PRESERVATIVE FREE 10 ML: 5 INJECTION INTRAVENOUS at 11:35

## 2021-11-17 RX ADMIN — SODIUM CHLORIDE, PRESERVATIVE FREE 10 ML: 5 INJECTION INTRAVENOUS at 10:26

## 2021-11-17 RX ADMIN — ONDANSETRON 8 MG: 2 INJECTION INTRAMUSCULAR; INTRAVENOUS at 10:14

## 2021-11-17 RX ADMIN — SODIUM CHLORIDE, PRESERVATIVE FREE 10 ML: 5 INJECTION INTRAVENOUS at 10:24

## 2021-11-17 RX ADMIN — DEXAMETHASONE SODIUM PHOSPHATE 4 MG: 4 INJECTION, SOLUTION INTRAMUSCULAR; INTRAVENOUS at 10:26

## 2021-11-17 RX ADMIN — MAGNESIUM SULFATE HEPTAHYDRATE 1000 MG: 1 INJECTION, SOLUTION INTRAVENOUS at 10:29

## 2021-11-17 RX ADMIN — DIPHENHYDRAMINE HYDROCHLORIDE 50 MG: 50 INJECTION, SOLUTION INTRAMUSCULAR; INTRAVENOUS at 10:14

## 2021-11-17 ASSESSMENT — PAIN SCALES - GENERAL: PAINLEVEL_OUTOF10: 0

## 2021-12-01 ENCOUNTER — TELEPHONE (OUTPATIENT)
Dept: PHYSICAL MEDICINE AND REHAB | Age: 56
End: 2021-12-01

## 2021-12-01 ENCOUNTER — OFFICE VISIT (OUTPATIENT)
Dept: PHYSICAL MEDICINE AND REHAB | Age: 56
End: 2021-12-01
Payer: COMMERCIAL

## 2021-12-01 VITALS
HEART RATE: 90 BPM | BODY MASS INDEX: 20.3 KG/M2 | DIASTOLIC BLOOD PRESSURE: 79 MMHG | WEIGHT: 114.6 LBS | SYSTOLIC BLOOD PRESSURE: 123 MMHG | HEIGHT: 63 IN

## 2021-12-01 DIAGNOSIS — M54.2 TRIGGER POINT WITH NECK PAIN: Primary | ICD-10-CM

## 2021-12-01 PROCEDURE — 20553 NJX 1/MLT TRIGGER POINTS 3/>: CPT | Performed by: PHYSICAL MEDICINE & REHABILITATION

## 2021-12-01 RX ORDER — ZOLMITRIPTAN 5 MG/1
TABLET, FILM COATED ORAL
Qty: 6 TABLET | Refills: 5 | Status: SHIPPED
Start: 2021-12-01 | End: 2022-02-25 | Stop reason: SDUPTHER

## 2021-12-01 NOTE — TELEPHONE ENCOUNTER
Called and spoke with Catalina from [address] [facility] to set up delivery for Botox 200 units, she stated that it is supposed to be delivered on 12-2-21.

## 2021-12-01 NOTE — PROGRESS NOTES
Curtis Mclaughlin D.O. [place] [facility]  [address] [facility]. [facility]  Phone: [phone]  Fax: [phone]    12/3/2021    Chief Complaint   Patient presents with    Injections     neck trigger point        Last injection: 9/29/21  Taking anticoagulants/antiplatelets: No  Diabetic: No  Febrile/active infection: No    After explaining the indications, risks, benefits and alternatives of a Bilateral trapezius and right rhomboid trigger point, the patient agreed to proceed. A permit was signed and scanned into the chart. The patient was placed in the  seated position. The skin over the trigger point was prepared with alcohol. Using aseptic no touch technique, a 22 gauge, 1 1/2\" needle with 6 cc of Xylocaine 1% was directed into the trigger point. After negative aspiration, 2 cc of the medication was injected in a fanning out method at each of three trigger points. Adequate hemostasis was achieved and a bandage applied. The patient tolerated the procedure well and was educated in post injection care. The patient was clinically monitored after the injection and left the office without incident. There was post-injection reduction in pain and improved range of motion. Curtis Mclaughlin D.O., P.T.   Board Certified Physical Medicine and Rehabilitation  Board Certified Electrodiagnostic Medicine    Administrations This Visit     lidocaine 1 % injection 6 mL     Admin Date  12/02/2021  09:50 Action  Given Dose  6 mL Route  Other Site   Administered By  Orlin Valentine MA    Ordering Provider: Dede Gonzalez DO    NDC: 6118-8743-02    Lot#: [id number]    : Mari Phelan    Patient Supplied?: No

## 2021-12-02 RX ORDER — LIDOCAINE HYDROCHLORIDE 10 MG/ML
6 INJECTION, SOLUTION INFILTRATION; PERINEURAL ONCE
Status: COMPLETED | OUTPATIENT
Start: 2021-12-02 | End: 2021-12-02

## 2021-12-02 RX ADMIN — LIDOCAINE HYDROCHLORIDE 6 ML: 10 INJECTION, SOLUTION INFILTRATION; PERINEURAL at 09:50

## 2021-12-08 ENCOUNTER — OFFICE VISIT (OUTPATIENT)
Dept: PHYSICAL MEDICINE AND REHAB | Age: 56
End: 2021-12-08
Payer: COMMERCIAL

## 2021-12-08 VITALS
WEIGHT: 113 LBS | HEIGHT: 63 IN | SYSTOLIC BLOOD PRESSURE: 120 MMHG | BODY MASS INDEX: 20.02 KG/M2 | HEART RATE: 85 BPM | DIASTOLIC BLOOD PRESSURE: 84 MMHG

## 2021-12-08 DIAGNOSIS — G43.709 CHRONIC MIGRAINE WITHOUT AURA WITHOUT STATUS MIGRAINOSUS, NOT INTRACTABLE: Primary | ICD-10-CM

## 2021-12-08 PROCEDURE — 64615 CHEMODENERV MUSC MIGRAINE: CPT | Performed by: PHYSICAL MEDICINE & REHABILITATION

## 2021-12-08 NOTE — PROGRESS NOTES
Monty Morris D.O. [place] [facility]  [address] [facility] [place]  Phone: [phone]  Fax: [phone]    12/8/2021    Chief Complaint   Patient presents with    Migraine       · Informed Consent:  The indications, risks, benefits, and  alternatives of onabotulinum toxin A were discussed with the patient. I explained to the patient the potential side effects including but not limited to: distant spread of toxin effect causing droopy eyelids, facial drooping, neck pain, headache, double vision, muscle pain/spasm/weakness/stiffness,  bronchitis,  injection site pain, increased blood pressure, hypersensitivity, anaphylaxis, difficulty swallowing, difficulty speaking, urinary incontinence and breathing difficulty. The patient is aware that swallowing and breathing difficulties can be life threatening and there have been reports of death in some cases where onabotulinum toxin was injected. The patient was advised of the expected benefit to include less headache days per month, and the anticipated duration of effect of 3 months. A permit was signed and scanned into the media. · Last injection:9/15/21  · Taking anticoagulants/antiplatelets: No  · Diabetic: No  · Febrile/active infection: No    · Procedure note: After obtaining verbal and written consent from the patient for injection of  onabotulinum toxin A the patient agreed to proceed. 200 units of onabotulinum toxin A was reconstituted using 4 cc of preservative free normal saline ( 5 units per 0.1 ml). The medication was injected using a 30 g 0.5\" needle and a 1 cc syringe. The skin was prepared with Betadine.   Using a no touch technique, the injections were carried out at the following sites: bilateral Temporalis 40 units divided at 8 sites, bilateral Occipitalis 30 units divided at 6 sites, bilateral cervical paraspinals 20 units divided at 4 sites, and bilateral trapezius 30 units divided at 6 sites, bilateral masseter 5 units. The total was 130 units and as a result there was 70 units of unavoidable waste Adequate hemostasis was obtained. Ice was applied for 20 minutes post injection. The patient tolerated the procedure well. There were no complications. The patient was educated in post-procedure care including ice 20 minutes on/20 minutes off prn pain/bruising/swelling, advised to avoid hats and rubbing the forehead for 1-2 days and to call if any fevers chills, night sweats, drainage, increased pain, weakness, difficulty breathing or swallowing. Patient advised to expect improvements in about 2 weeks.  follow up 6 weeks      Ariana Patricia D.O., P.T.   Board Certified Physical Medicine and Rehabilitation  Board Certified Electrodiagnostic Medicine    Administrations This Visit     Onabotulinumtoxin A (BOTOX (COSMETIC)) injection 200 Units     Admin Date  12/08/2021  15:48 Action  Given Dose  200 Units Route  IntraMUSCular Site  Other Administered By  Laron Cui MA    Ordering Provider: Rita Tristan DO    NDC: 4803-9641-37    Lot#: [id number]    : Braswell Flatter    Patient Supplied?: Yes

## 2022-01-14 ENCOUNTER — OFFICE VISIT (OUTPATIENT)
Dept: PHYSICAL MEDICINE AND REHAB | Age: 57
End: 2022-01-14
Payer: COMMERCIAL

## 2022-01-14 VITALS
HEART RATE: 105 BPM | SYSTOLIC BLOOD PRESSURE: 115 MMHG | TEMPERATURE: 97 F | WEIGHT: 114 LBS | BODY MASS INDEX: 20.98 KG/M2 | HEIGHT: 62 IN | DIASTOLIC BLOOD PRESSURE: 82 MMHG

## 2022-01-14 DIAGNOSIS — M54.2 TRIGGER POINT WITH NECK PAIN: Primary | ICD-10-CM

## 2022-01-14 PROCEDURE — 20553 NJX 1/MLT TRIGGER POINTS 3/>: CPT | Performed by: PHYSICAL MEDICINE & REHABILITATION

## 2022-01-14 RX ORDER — LIDOCAINE HYDROCHLORIDE 10 MG/ML
6 INJECTION, SOLUTION INFILTRATION; PERINEURAL ONCE
Status: COMPLETED | OUTPATIENT
Start: 2022-01-14 | End: 2022-01-14

## 2022-01-14 RX ORDER — PROMETHAZINE HYDROCHLORIDE 25 MG/1
25 TABLET ORAL EVERY 6 HOURS PRN
Qty: 40 TABLET | Refills: 2 | Status: SHIPPED
Start: 2022-01-14 | End: 2022-02-25 | Stop reason: SDUPTHER

## 2022-01-14 RX ADMIN — LIDOCAINE HYDROCHLORIDE 6 ML: 10 INJECTION, SOLUTION INFILTRATION; PERINEURAL at 15:52

## 2022-01-14 NOTE — PROGRESS NOTES
Griselda Boyce D.O. [place] [facility]  [address] [facility]  Phone: [phone]  Fax: [phone]    1/14/2022    Chief Complaint   Patient presents with    Migraine     6 week follow up after botox and trigger point injections       Last injection: 12/1/21  Taking anticoagulants/antiplatelets: No  Diabetic: No  Febrile/active infection: No    After explaining the indications, risks, benefits and alternatives of a Bilateral trapezius and right levator scapulae trigger point, the patient agreed to proceed. A permit was signed and scanned into the chart. The patient was placed in the  seated position. The skin over the trigger point was prepared with alcohol. Using aseptic no touch technique, a 22 gauge, 1 1/2\" needle with 6 cc of Xylocaine 1% was directed into the trigger point. After negative aspiration, 2cc of the medication was injected in a fanning out method at each of three trigger points. Adequate hemostasis was achieved and a bandage applied. The patient tolerated the procedure well and was educated in post injection care. The patient was clinically monitored after the injection and left the office without incident. There was post-injection reduction in pain and improved range of motion. Griselda Boyce D.O., P.T.   Board Certified Physical Medicine and Rehabilitation  Board Certified Electrodiagnostic Medicine    Administrations This Visit     lidocaine 1 % injection 6 mL     Admin Date  01/14/2022  15:52 Action  Given Dose  6 mL Route  Other Site   Administered By  Denisa Dexter MA    Ordering Provider: Haley Reed DO    NDC: 8395-9587-45    Lot#: [id number]    : Alber Cintron    Patient Supplied?: No

## 2022-01-19 ENCOUNTER — OFFICE VISIT (OUTPATIENT)
Dept: PHYSICAL MEDICINE AND REHAB | Age: 57
End: 2022-01-19
Payer: COMMERCIAL

## 2022-01-19 VITALS
HEIGHT: 62 IN | SYSTOLIC BLOOD PRESSURE: 123 MMHG | WEIGHT: 112 LBS | BODY MASS INDEX: 20.61 KG/M2 | DIASTOLIC BLOOD PRESSURE: 84 MMHG | HEART RATE: 94 BPM

## 2022-01-19 DIAGNOSIS — G43.709 CHRONIC MIGRAINE WITHOUT AURA WITHOUT STATUS MIGRAINOSUS, NOT INTRACTABLE: Primary | ICD-10-CM

## 2022-01-19 DIAGNOSIS — M54.81 BILATERAL OCCIPITAL NEURALGIA: ICD-10-CM

## 2022-01-19 DIAGNOSIS — M54.2 TRIGGER POINT WITH NECK PAIN: ICD-10-CM

## 2022-01-19 PROCEDURE — 96372 THER/PROPH/DIAG INJ SC/IM: CPT | Performed by: PHYSICAL MEDICINE & REHABILITATION

## 2022-01-19 PROCEDURE — 20552 NJX 1/MLT TRIGGER POINT 1/2: CPT | Performed by: PHYSICAL MEDICINE & REHABILITATION

## 2022-01-19 PROCEDURE — 64405 NJX AA&/STRD GR OCPL NRV: CPT | Performed by: PHYSICAL MEDICINE & REHABILITATION

## 2022-01-19 RX ORDER — KETOROLAC TROMETHAMINE 15 MG/ML
15 INJECTION, SOLUTION INTRAMUSCULAR; INTRAVENOUS ONCE
Status: COMPLETED | OUTPATIENT
Start: 2022-01-19 | End: 2022-01-19

## 2022-01-19 RX ORDER — BUPIVACAINE HYDROCHLORIDE 2.5 MG/ML
2 INJECTION, SOLUTION INFILTRATION; PERINEURAL ONCE
Status: COMPLETED | OUTPATIENT
Start: 2022-01-19 | End: 2022-01-19

## 2022-01-19 RX ORDER — LIDOCAINE HYDROCHLORIDE 10 MG/ML
2 INJECTION, SOLUTION INFILTRATION; PERINEURAL ONCE
Status: COMPLETED | OUTPATIENT
Start: 2022-01-19 | End: 2022-01-19

## 2022-01-19 RX ADMIN — KETOROLAC TROMETHAMINE 15 MG: 15 INJECTION, SOLUTION INTRAMUSCULAR; INTRAVENOUS at 16:09

## 2022-01-19 RX ADMIN — BUPIVACAINE HYDROCHLORIDE 5 MG: 2.5 INJECTION, SOLUTION INFILTRATION; PERINEURAL at 16:08

## 2022-01-19 RX ADMIN — LIDOCAINE HYDROCHLORIDE 2 ML: 10 INJECTION, SOLUTION INFILTRATION; PERINEURAL at 16:10

## 2022-01-19 NOTE — PROGRESS NOTES
Lily Bertrand D.O. [place] [facility]  [address] [facility] [place]  Phone: [phone]  Fax: [phone]    1/20/2022    Chief Complaint   Patient presents with    Neck Pain     follow up       Last injection: 1/14/22  Taking anticoagulants/antiplatelets: No  Diabetic: No  Febrile/active infection: No    After explaining the indications, risks, benefits and alternatives of a Right levator scapulae trigger point, the patient agreed to proceed. A permit was signed and scanned into the chart. The patient was placed in the  seated position. The skin over the trigger point was prepared with alcohol. Using aseptic no touch technique, a 22 gauge, 1 1/2\" needle with 2 cc of Xylocaine 1% was directed into the trigger point. After negative aspiration, the medication was injected in a fanning out method. Adequate hemostasis was achieved and a bandage applied. The patient tolerated the procedure well and was educated in post injection care. The patient was clinically monitored after the injection and left the office without incident. There was post-injection reduction in pain and improved range of motion. Lily Bertrand D.O., P.T.   Board Certified Physical Medicine and Rehabilitation  Board Certified Electrodiagnostic Medicine    Administrations This Visit     bupivacaine (MARCAINE) 0.25 % injection 5 mg     Admin Date  01/19/2022  16:08 Action  Given Dose  5 mg Route  IntraDERmal Site  Other Administered By  Megan Prasad MA    Ordering Provider: Marylou Yeung DO ND: 4841-6453-57    Lot#: [id number]    : HOSPIRA    Patient Supplied?: No          ketorolac (TORADOL) injection 15 mg     Admin Date  01/19/2022  16:09 Action  Given Dose  15 mg Route  IntraMUSCular Site  Deltoid Right Administered By  Megan Prasad MA    Ordering Provider: Marylou Yeung DO    ND: 3168-3912-71    Lot#: [id number]    : Juan Alberto Baker    Patient Supplied?: No          lidocaine 1 % injection 2 mL     Admin Date  01/19/2022  16:10 Action  Given Dose  2 mL Route  Other Site   Administered By  Pretty Means MA    Ordering Provider: Kelechi Eli DO    [facility]: 1674-1659-24    Lot#: [id number]    : HOSPIRA    Patient Supplied?: No                Fabián Vuong D.O. [place] [facility]  [address] [facility] [place] [address]  Phone: [phone]  Fax: [phone]    1/20/2022    Chief Complaint   Patient presents with    Neck Pain     follow up       Last injection: 10/26/21  Taking anticoagulants/antiplatelets: No  Diabetic: No  Febrile/active infection: No    After explaining the indications, risks, benefits and alternatives of a Right occipital nerve block, the patient agreed to proceed. A permit was signed and scanned into the media. The patient was placed in the seated position. The skin was prepared with alcohol. Using an aseptic, no touch technique, a 25 gauge, 5/8\" needle with 2 cc of Bupivicaine 0.25% was directed to the occipital notch at the point of maximal tenderness. After negative aspiration, the medication was injected was injected. Adequate hemostasis was obtained. The patient tolerated the procedure well and was educated in post injection care. The patient was monitored clinically and left the office without incident. Pain was reduced post-injection. Fabián Vuong D.O., P.T.   Board Certified Physical Medicine and Rehabilitation  Board Certified Electrodiagnostic Medicine    Administrations This Visit     bupivacaine (MARCAINE) 0.25 % injection 5 mg     Admin Date  01/19/2022  16:08 Action  Given Dose  5 mg Route  IntraDERmal Site  Other Administered By  Pretty Means MA    Ordering Provider: Kelechi Eli DO    NDC: 3216-5737-14    Lot#: [id number]    : HOSPIRA    Patient Supplied?: No          ketorolac (TORADOL) injection 15 mg     Admin Date  01/19/2022  16:09 Action  Given Dose  15 mg Route  IntraMUSCular Site  Deltoid Right Administered By  Daniel Martines MA    Ordering Provider: Oneida Murray DO    NDC: 8796-0977-58    Lot#: [id number]    : [place]    Patient Supplied?: No          lidocaine 1 % injection 2 mL     Admin Date  01/19/2022  16:10 Action  Given Dose  2 mL Route  Other Site   Administered By  Daniel Martines MA    Ordering Provider: Oneida Murray DO    [address]: 2476-8129-25    Lot#: [id number]    : HOSPIRA    Patient Supplied?: No

## 2022-01-26 ENCOUNTER — PATIENT MESSAGE (OUTPATIENT)
Dept: PHYSICAL MEDICINE AND REHAB | Age: 57
End: 2022-01-26

## 2022-01-26 DIAGNOSIS — G43.709 CHRONIC MIGRAINE WITHOUT AURA WITHOUT STATUS MIGRAINOSUS, NOT INTRACTABLE: ICD-10-CM

## 2022-01-26 RX ORDER — BUTALBITAL, ACETAMINOPHEN AND CAFFEINE 300; 40; 50 MG/1; MG/1; MG/1
1 CAPSULE ORAL 2 TIMES DAILY PRN
Qty: 60 CAPSULE | Refills: 2 | Status: SHIPPED
Start: 2022-01-26 | End: 2022-04-05 | Stop reason: SDUPTHER

## 2022-01-26 NOTE — TELEPHONE ENCOUNTER
From: Juli Hale  To: Dr. Eason Likes: 1/26/2022 8:24 AM EST  Subject: Suzon Primer morning Dr. Terry Rodriguez and ladies! Im so sorry to bother you with a prescription request. When I was there last week I thought I had a refill (s) on my fiorcet. When I went to submit a refill today the [employer] adriel says I do not. May I have a prescription of [facility]et [facility]? The nerve block has helped along with the trigger points. I havent had a migraine since. However I am dealing with some lmid level headaches that are teetering and the fiorcet does help them.      Thank you,     [address]

## 2022-02-04 ENCOUNTER — TELEPHONE (OUTPATIENT)
Dept: PHYSICAL MEDICINE AND REHAB | Age: 57
End: 2022-02-04

## 2022-02-04 NOTE — TELEPHONE ENCOUNTER
Called and spoke Odilon pharmacist with [facility] specialty pharmacy to call in new script for Botox 200 units 1 vial 3 refills.

## 2022-02-09 ENCOUNTER — TELEPHONE (OUTPATIENT)
Dept: PHYSICAL MEDICINE AND REHAB | Age: 57
End: 2022-02-09

## 2022-02-09 NOTE — TELEPHONE ENCOUNTER
Pt called in, she would like to schedule an appt for trigger point inj. She stated she had 3 migraines since 2/6/22.  Filipe Drake can be reached at [phone]

## 2022-02-09 NOTE — TELEPHONE ENCOUNTER
Called and spoke with the patient and she stated that she is had 3 headache days in a row and she has a muscle that is getting muscle spasm in shoulder blade and up the right side of neck. Would like to get trigger point injection and she also stated that she coming in on Monday for her botox. Please advise if she should come in for trigger point injection.

## 2022-02-10 ENCOUNTER — OFFICE VISIT (OUTPATIENT)
Dept: PHYSICAL MEDICINE AND REHAB | Age: 57
End: 2022-02-10
Payer: COMMERCIAL

## 2022-02-10 VITALS
HEIGHT: 62 IN | DIASTOLIC BLOOD PRESSURE: 88 MMHG | WEIGHT: 112 LBS | HEART RATE: 93 BPM | BODY MASS INDEX: 20.61 KG/M2 | SYSTOLIC BLOOD PRESSURE: 128 MMHG

## 2022-02-10 DIAGNOSIS — M54.2 TRIGGER POINT WITH NECK PAIN: Primary | ICD-10-CM

## 2022-02-10 PROCEDURE — 20552 NJX 1/MLT TRIGGER POINT 1/2: CPT | Performed by: PHYSICAL MEDICINE & REHABILITATION

## 2022-02-10 RX ORDER — BROMPHENIRAMINE MALEATE, PSEUDOEPHEDRINE HYDROCHLORIDE, AND DEXTROMETHORPHAN HYDROBROMIDE 2; 30; 10 MG/5ML; MG/5ML; MG/5ML
SYRUP ORAL
COMMUNITY
Start: 2022-01-31 | End: 2022-06-13 | Stop reason: ALTCHOICE

## 2022-02-10 RX ORDER — LORATADINE 10 MG/1
10 CAPSULE, LIQUID FILLED ORAL DAILY
COMMUNITY

## 2022-02-10 RX ORDER — BUDESONIDE 0.5 MG/2ML
INHALANT ORAL
COMMUNITY
Start: 2022-01-26 | End: 2022-06-13 | Stop reason: ALTCHOICE

## 2022-02-10 RX ORDER — LIDOCAINE HYDROCHLORIDE 10 MG/ML
4 INJECTION, SOLUTION INFILTRATION; PERINEURAL ONCE
Status: COMPLETED | OUTPATIENT
Start: 2022-02-10 | End: 2022-02-10

## 2022-02-10 RX ORDER — METHYLPREDNISOLONE 4 MG/1
TABLET ORAL
COMMUNITY
Start: 2022-01-31 | End: 2022-06-13 | Stop reason: ALTCHOICE

## 2022-02-10 RX ADMIN — LIDOCAINE HYDROCHLORIDE 4 ML: 10 INJECTION, SOLUTION INFILTRATION; PERINEURAL at 14:23

## 2022-02-10 NOTE — PROGRESS NOTES
Damaso Woo D.O. [place] [facility]  [address] [facility] [place]  Phone: [phone]  Fax: [phone]    2/14/2022    Chief Complaint   Patient presents with    Neck Pain     trigger point injections       Last injection: 1/19/22  Taking anticoagulants/antiplatelets: No  Diabetic: No  Febrile/active infection: No    Ambulatory Procedure Time Out  Correct Patient: Yes  Correct Procedure: Yes  Correct Site/Side: Yes  Correct Site(s) Marked: Yes  Informed Consent Signed: Yes  Allergies Verified: Yes  Staff Present & Credential[de-identified] Angela JEFFERY/    After explaining the indications, risks, benefits and alternatives of a Right trapezius and levator scapulae trigger point, the patient agreed to proceed. A permit was signed and scanned into the chart. The patient was placed in the  seated position. The skin over the trigger point was prepared with alcohol. Using aseptic no touch technique, a 22 gauge, 1 1/2\" needle with 2 cc of Xylocaine 1% was directed into the trigger point. After negative aspiration, the medication was injected in a fanning out method. Adequate hemostasis was achieved and a bandage applied. The patient tolerated the procedure well and was educated in post injection care. The patient was clinically monitored after the injection and left the office without incident. There was post-injection reduction in pain and improved range of motion. Orders Placed This Encounter   Procedures    External Referral To Acupuncture     Referral Priority:   Routine     Referral Type:   Eval and Treat     Referral Reason:   Specialty Services Required     Requested Specialty:   Acupuncturist     Number of Visits Requested:   1    RI INJECT TRIGGER POINT, 1 OR 2           Damaso Woo D.O., P.T.   Board Certified Physical Medicine and Rehabilitation  Board Certified Electrodiagnostic Medicine    Administrations This Visit     lidocaine 1 %

## 2022-02-25 ENCOUNTER — OFFICE VISIT (OUTPATIENT)
Dept: PHYSICAL MEDICINE AND REHAB | Age: 57
End: 2022-02-25
Payer: COMMERCIAL

## 2022-02-25 VITALS
HEART RATE: 87 BPM | WEIGHT: 111 LBS | DIASTOLIC BLOOD PRESSURE: 90 MMHG | SYSTOLIC BLOOD PRESSURE: 131 MMHG | BODY MASS INDEX: 20.43 KG/M2 | HEIGHT: 62 IN

## 2022-02-25 DIAGNOSIS — G43.709 CHRONIC MIGRAINE WITHOUT AURA WITHOUT STATUS MIGRAINOSUS, NOT INTRACTABLE: Primary | ICD-10-CM

## 2022-02-25 DIAGNOSIS — M54.2 TRIGGER POINT WITH NECK PAIN: ICD-10-CM

## 2022-02-25 PROCEDURE — 64615 CHEMODENERV MUSC MIGRAINE: CPT | Performed by: PHYSICAL MEDICINE & REHABILITATION

## 2022-02-25 RX ORDER — PROMETHAZINE HYDROCHLORIDE 25 MG/1
25 TABLET ORAL EVERY 6 HOURS PRN
Qty: 40 TABLET | Refills: 2 | Status: SHIPPED
Start: 2022-02-25 | End: 2022-04-08 | Stop reason: SDUPTHER

## 2022-02-25 RX ORDER — ZOLMITRIPTAN 5 MG/1
TABLET, FILM COATED ORAL
Qty: 6 TABLET | Refills: 5 | Status: SHIPPED
Start: 2022-02-25 | End: 2022-05-25 | Stop reason: SDUPTHER

## 2022-02-25 RX ORDER — GABAPENTIN 300 MG/1
300 CAPSULE ORAL 3 TIMES DAILY
Qty: 90 CAPSULE | Refills: 2 | Status: SHIPPED
Start: 2022-02-25 | End: 2022-05-25 | Stop reason: SDUPTHER

## 2022-02-25 NOTE — PROGRESS NOTES
Pallavi Phillips D.O. [place] [facility]  [address] [facility] [place]  Phone: [phone]  Fax: [phone]    2/28/2022    Chief Complaint   Patient presents with    Migraine     Botox injection 200 units       · Informed Consent:  The indications, risks, benefits, and  alternatives of onabotulinum toxin A were discussed with the patient. I explained to the patient the potential side effects including but not limited to: distant spread of toxin effect causing droopy eyelids, facial drooping, neck pain, headache, double vision, muscle pain/spasm/weakness/stiffness,  bronchitis,  injection site pain, increased blood pressure, hypersensitivity, anaphylaxis, difficulty swallowing, difficulty speaking, urinary incontinence and breathing difficulty. The patient is aware that swallowing and breathing difficulties can be life threatening and there have been reports of death in some cases where onabotulinum toxin was injected. The patient was advised of the expected benefit to include less headache days per month, and the anticipated duration of effect of 3 months. A permit was signed and scanned into the media. · Last injection:12/8//21  · Taking anticoagulants/antiplatelets: No  · Diabetic: No  · Febrile/active infection: No    · Procedure note: After obtaining verbal and written consent from the patient for injection of  onabotulinum toxin A the patient agreed to proceed. 200 units of onabotulinum toxin A was reconstituted using 4 cc of preservative free normal saline ( 5 units per 0.1 ml). The medication was injected using a 30 g 0.5\" needle and a 1 cc syringe. The skin was prepared with Betadine.   Using a no touch technique, the injections were carried out at the following sites: bilateral Temporalis 40 units divided at 8 sites, bilateral Occipitalis 30 units divided at 6 sites, bilateral cervical paraspinals 20 units divided at 4 sites, and bilateral trapezius 60 units divided at 6 sites, bilateral masseter 5 units. The total was 160 units and as a result there was 40 units of unavoidable waste Adequate hemostasis was obtained. Ice was applied for 20 minutes post injection. The patient tolerated the procedure well. There were no complications. The patient was educated in post-procedure care including ice 20 minutes on/20 minutes off prn pain/bruising/swelling, advised to avoid hats and rubbing the forehead for 1-2 days and to call if any fevers chills, night sweats, drainage, increased pain, weakness, difficulty breathing or swallowing. Patient advised to expect improvements in about 2 weeks.  follow up 6 weeks      Anisa Pride D.O., P.T.   Board Certified Physical Medicine and Rehabilitation  Board Certified Electrodiagnostic Medicine    Administrations This Visit     Onabotulinumtoxin A (BOTOX (COSMETIC)) injection 200 Units     Admin Date  02/28/2022  08:05 Action  Given Dose  200 Units Route  IntraMUSCular Site  Other Administered By  Jenny Vu MA    Ordering Provider: Kia Chung DO    NDC: 6802-7094-95    Lot#: [id number]    : Volga Isabel    Patient Supplied?: Yes

## 2022-03-02 ENCOUNTER — OFFICE VISIT (OUTPATIENT)
Dept: PHYSICAL MEDICINE AND REHAB | Age: 57
End: 2022-03-02
Payer: COMMERCIAL

## 2022-03-02 VITALS
HEART RATE: 82 BPM | HEIGHT: 62 IN | DIASTOLIC BLOOD PRESSURE: 80 MMHG | WEIGHT: 112 LBS | BODY MASS INDEX: 20.61 KG/M2 | TEMPERATURE: 97.2 F | SYSTOLIC BLOOD PRESSURE: 118 MMHG

## 2022-03-02 DIAGNOSIS — M54.2 TRIGGER POINT OF NECK: Primary | ICD-10-CM

## 2022-03-02 PROCEDURE — 20552 NJX 1/MLT TRIGGER POINT 1/2: CPT | Performed by: PHYSICAL MEDICINE & REHABILITATION

## 2022-03-02 PROCEDURE — 96372 THER/PROPH/DIAG INJ SC/IM: CPT | Performed by: PHYSICAL MEDICINE & REHABILITATION

## 2022-03-02 RX ORDER — LIDOCAINE HYDROCHLORIDE 10 MG/ML
4 INJECTION, SOLUTION INFILTRATION; PERINEURAL ONCE
Status: COMPLETED | OUTPATIENT
Start: 2022-03-02 | End: 2022-03-02

## 2022-03-02 RX ORDER — ORPHENADRINE CITRATE 30 MG/ML
60 INJECTION INTRAMUSCULAR; INTRAVENOUS ONCE
Status: COMPLETED | OUTPATIENT
Start: 2022-03-02 | End: 2022-03-02

## 2022-03-02 RX ORDER — KETOROLAC TROMETHAMINE 15 MG/ML
15 INJECTION, SOLUTION INTRAMUSCULAR; INTRAVENOUS ONCE
Status: COMPLETED | OUTPATIENT
Start: 2022-03-02 | End: 2022-03-02

## 2022-03-02 RX ADMIN — LIDOCAINE HYDROCHLORIDE 4 ML: 10 INJECTION, SOLUTION INFILTRATION; PERINEURAL at 15:26

## 2022-03-02 RX ADMIN — KETOROLAC TROMETHAMINE 15 MG: 15 INJECTION, SOLUTION INTRAMUSCULAR; INTRAVENOUS at 15:25

## 2022-03-02 RX ADMIN — ORPHENADRINE CITRATE 60 MG: 30 INJECTION INTRAMUSCULAR; INTRAVENOUS at 15:26

## 2022-03-02 NOTE — PROGRESS NOTES
Anisa Pride D.O. [place] [facility]  [address] [facility]  Phone: [phone]  Fax: [phone]    3/2/2022    Chief Complaint   Patient presents with    Neck Pain     possible trigger point injection        Last injection: 2/10/22  Taking anticoagulants/antiplatelets: No  Diabetic: No  Febrile/active infection: No    Ambulatory Procedure Time Out  Correct Patient: Yes  Correct Procedure: Yes  Correct Site/Side: Yes  Correct Site(s) Marked: Yes  Informed Consent Signed: Yes  Allergies Verified: Yes  Staff Present & Credential[de-identified] Jenny Vu CMA    After explaining the indications, risks, benefits and alternatives of a Right trapezius, right rhomboid trigger point, the patient agreed to proceed. A permit was signed and scanned into the chart. The patient was placed in the  seated position. The skin over the trigger point was prepared with alcohol. Using aseptic no touch technique, a 22 gauge, 1 1/2\" needle with 4 cc of Xylocaine 1% was directed into the trigger point. After negative aspiration, 2 cc of the medication was injected in a fanning out method at each of 2 trigger points. Adequate hemostasis was achieved and a bandage applied. The patient tolerated the procedure well and was educated in post injection care. The patient was clinically monitored after the injection and left the office without incident. There was post-injection reduction in pain and improved range of motion. Anisa Pride D.O., P.T.   Board Certified Physical Medicine and Rehabilitation  Board Certified Electrodiagnostic Medicine    Administrations This Visit     ketorolac (TORADOL) injection 15 mg     Admin Date  03/02/2022  15:25 Action  Given Dose  15 mg Route  IntraMUSCular Site  Deltoid Right Administered By  Jenny Vu MA    Ordering Provider: Kia Chung DO    NDC: 4234-3929-33    Lot#: [id number]    : HOSPIRA    Patient Supplied?: No lidocaine 1 % injection 4 mL     Admin Date  03/02/2022  15:26 Action  Given Dose  4 mL Route  Other Site   Administered By  Bobby Caruso MA    Ordering Provider: Danish Rai DO    NDC: 2555-1042-50    Lot#: [id number]    : HOSPIRA    Patient Supplied?: No          orphenadrine (NORFLEX) injection 60 mg     Admin Date  03/02/2022  15:26 Action  Given Dose  60 mg Route  IntraMUSCular Site  Deltoid Left Administered By  Bobby Caruso MA    Ordering Provider: DO Anayeli Pizarro [address]: 22463-738-40    Lot#: [id number]    : AKORN    Patient Supplied?: No

## 2022-03-31 ENCOUNTER — TELEPHONE (OUTPATIENT)
Dept: PHYSICAL MEDICINE AND REHAB | Age: 57
End: 2022-03-31

## 2022-03-31 NOTE — TELEPHONE ENCOUNTER
Check with patient what she is currently on. We had been titrating it and I am not sure where she ended up. We were trying to get to 50 mg bid.

## 2022-03-31 NOTE — TELEPHONE ENCOUNTER
Pharmacy called in wanting clarification of topiramate  The instructions are contraindicating, please clarify how you want patient to take medication and I can call pharmacy back   Thanks

## 2022-03-31 NOTE — TELEPHONE ENCOUNTER
Called [facility] pharmacy back and spoke to [place] to clarify topirimate. Called patient and patient states she is on 50mg twice a day. [facility] made appropriate changes.

## 2022-04-05 ENCOUNTER — PATIENT MESSAGE (OUTPATIENT)
Dept: PHYSICAL MEDICINE AND REHAB | Age: 57
End: 2022-04-05

## 2022-04-05 DIAGNOSIS — G43.709 CHRONIC MIGRAINE WITHOUT AURA WITHOUT STATUS MIGRAINOSUS, NOT INTRACTABLE: ICD-10-CM

## 2022-04-05 RX ORDER — BUTALBITAL, ACETAMINOPHEN AND CAFFEINE 300; 40; 50 MG/1; MG/1; MG/1
1 CAPSULE ORAL 2 TIMES DAILY PRN
Qty: 60 CAPSULE | Refills: 2 | Status: SHIPPED
Start: 2022-04-05 | End: 2022-05-25 | Stop reason: SDUPTHER

## 2022-04-05 NOTE — TELEPHONE ENCOUNTER
From: Anne Marie Gordon  To: Dr. Vijay Bawja: 4/5/2022 9:09 AM EDT  Subject: Namita Francisco morning Dr. Eileen Rob and ladies,     I hope you are all doing well. First of all I am very sorry for messaging for medication. Generally my visits are timed correctly. I was hoping Dr. Eileen Rob could call in/send [facility]. My neck and shoulders know Im approaching my 6 week appointment and theyre very unhappy and causing me many headaches recently. Please let me know.      Have a great day,     Bruno Garcia

## 2022-04-08 ENCOUNTER — OFFICE VISIT (OUTPATIENT)
Dept: PHYSICAL MEDICINE AND REHAB | Age: 57
End: 2022-04-08
Payer: COMMERCIAL

## 2022-04-08 VITALS
BODY MASS INDEX: 20.46 KG/M2 | HEIGHT: 62 IN | WEIGHT: 111.2 LBS | HEART RATE: 96 BPM | SYSTOLIC BLOOD PRESSURE: 127 MMHG | DIASTOLIC BLOOD PRESSURE: 69 MMHG

## 2022-04-08 DIAGNOSIS — G43.709 CHRONIC MIGRAINE WITHOUT AURA WITHOUT STATUS MIGRAINOSUS, NOT INTRACTABLE: Primary | ICD-10-CM

## 2022-04-08 DIAGNOSIS — M54.2 TRIGGER POINT OF NECK: ICD-10-CM

## 2022-04-08 DIAGNOSIS — M47.812 SPONDYLOSIS OF CERVICAL REGION WITHOUT MYELOPATHY OR RADICULOPATHY: ICD-10-CM

## 2022-04-08 DIAGNOSIS — M79.18 MYOFASCIAL PAIN DYSFUNCTION SYNDROME: ICD-10-CM

## 2022-04-08 PROCEDURE — 99214 OFFICE O/P EST MOD 30 MIN: CPT | Performed by: PHYSICAL MEDICINE & REHABILITATION

## 2022-04-08 PROCEDURE — 20552 NJX 1/MLT TRIGGER POINT 1/2: CPT | Performed by: PHYSICAL MEDICINE & REHABILITATION

## 2022-04-08 RX ORDER — FLUCONAZOLE 150 MG/1
TABLET ORAL
COMMUNITY
Start: 2022-03-31 | End: 2022-05-03 | Stop reason: ALTCHOICE

## 2022-04-08 RX ORDER — HYDROXYZINE PAMOATE 25 MG/1
CAPSULE ORAL
COMMUNITY
Start: 2022-03-16 | End: 2022-06-13 | Stop reason: ALTCHOICE

## 2022-04-08 RX ORDER — LIDOCAINE HYDROCHLORIDE 10 MG/ML
2 INJECTION, SOLUTION INFILTRATION; PERINEURAL ONCE
Status: COMPLETED | OUTPATIENT
Start: 2022-04-08 | End: 2022-04-11

## 2022-04-08 RX ORDER — AMOXICILLIN 500 MG/1
CAPSULE ORAL
COMMUNITY
Start: 2022-03-31 | End: 2022-06-13 | Stop reason: ALTCHOICE

## 2022-04-08 RX ORDER — PROMETHAZINE HYDROCHLORIDE 25 MG/1
25 TABLET ORAL EVERY 6 HOURS PRN
Qty: 40 TABLET | Refills: 2 | Status: SHIPPED
Start: 2022-04-08 | End: 2022-05-25 | Stop reason: SDUPTHER

## 2022-04-11 RX ADMIN — LIDOCAINE HYDROCHLORIDE 2 ML: 10 INJECTION, SOLUTION INFILTRATION; PERINEURAL at 08:28

## 2022-05-03 ENCOUNTER — OFFICE VISIT (OUTPATIENT)
Dept: FAMILY MEDICINE CLINIC | Age: 57
End: 2022-05-03
Payer: COMMERCIAL

## 2022-05-03 VITALS
DIASTOLIC BLOOD PRESSURE: 84 MMHG | WEIGHT: 111 LBS | RESPIRATION RATE: 17 BRPM | HEIGHT: 62 IN | SYSTOLIC BLOOD PRESSURE: 139 MMHG | HEART RATE: 104 BPM | OXYGEN SATURATION: 98 % | TEMPERATURE: 97.2 F | BODY MASS INDEX: 20.43 KG/M2

## 2022-05-03 DIAGNOSIS — B37.9 ANTIBIOTIC-INDUCED YEAST INFECTION: ICD-10-CM

## 2022-05-03 DIAGNOSIS — J40 SINOBRONCHITIS: ICD-10-CM

## 2022-05-03 DIAGNOSIS — Z20.822 CLOSE EXPOSURE TO COVID-19 VIRUS: Primary | ICD-10-CM

## 2022-05-03 DIAGNOSIS — J32.9 SINOBRONCHITIS: ICD-10-CM

## 2022-05-03 DIAGNOSIS — T36.95XA ANTIBIOTIC-INDUCED YEAST INFECTION: ICD-10-CM

## 2022-05-03 PROCEDURE — 99213 OFFICE O/P EST LOW 20 MIN: CPT | Performed by: NURSE PRACTITIONER

## 2022-05-03 PROCEDURE — 87635 SARS-COV-2 COVID-19 AMP PRB: CPT | Performed by: NURSE PRACTITIONER

## 2022-05-03 RX ORDER — AZITHROMYCIN 250 MG/1
250 TABLET, FILM COATED ORAL SEE ADMIN INSTRUCTIONS
Qty: 6 TABLET | Refills: 0 | Status: SHIPPED | OUTPATIENT
Start: 2022-05-03 | End: 2022-05-08

## 2022-05-03 RX ORDER — FLUCONAZOLE 150 MG/1
150 TABLET ORAL ONCE
Qty: 1 TABLET | Refills: 0 | Status: SHIPPED | OUTPATIENT
Start: 2022-05-03 | End: 2022-05-03

## 2022-05-03 SDOH — ECONOMIC STABILITY: FOOD INSECURITY: WITHIN THE PAST 12 MONTHS, YOU WORRIED THAT YOUR FOOD WOULD RUN OUT BEFORE YOU GOT MONEY TO BUY MORE.: NEVER TRUE

## 2022-05-03 SDOH — ECONOMIC STABILITY: FOOD INSECURITY: WITHIN THE PAST 12 MONTHS, THE FOOD YOU BOUGHT JUST DIDN'T LAST AND YOU DIDN'T HAVE MONEY TO GET MORE.: NEVER TRUE

## 2022-05-03 ASSESSMENT — PATIENT HEALTH QUESTIONNAIRE - PHQ9
SUM OF ALL RESPONSES TO PHQ9 QUESTIONS 1 & 2: 0
2. FEELING DOWN, DEPRESSED OR HOPELESS: 0
SUM OF ALL RESPONSES TO PHQ QUESTIONS 1-9: 0
1. LITTLE INTEREST OR PLEASURE IN DOING THINGS: 0

## 2022-05-03 ASSESSMENT — SOCIAL DETERMINANTS OF HEALTH (SDOH): HOW HARD IS IT FOR YOU TO PAY FOR THE VERY BASICS LIKE FOOD, HOUSING, MEDICAL CARE, AND HEATING?: NOT HARD AT ALL

## 2022-05-03 NOTE — PROGRESS NOTES
Chief Complaint       Headache (sinus congestion and drainage, sore throat, body aches, co worker positive for covid )    History of Present Illness   Source of history provided by:  patient. Edi Jackson is a 55 y.o. old female presenting to the walk in clinic for evaluation of above symptoms, for x 3 days, nausea. Denies any diarrhea, CP, dyspnea, LE edema, abdominal pain, vomiting, rash, or lethargy. Denies hx of asthma or COPD; denies tobacco use. Patient reports recent sick exposures. Patient has not been vaccinated for COVID-19. Patient has been taking steroid for allergy on Friday for symptomatic relief. Works in [employer]. Able to eat & drink. Strong history of migraines, has BOTOX with Dr. Erica Whelan. ROS    Unless otherwise stated in this report or unable to obtain because of the patient's clinical or mental status as evidenced by the medical record, this patients's positive and negative responses for Review of Systems, constitutional, psych, eyes, ENT, cardiovascular, respiratory, gastrointestinal, neurological, genitourinary, musculoskeletal, integument systems and systems related to the presenting problem are either stated in the preceding or were not pertinent or were negative for the symptoms and/or complaints related to the medical problem. Past Medical History:  has a past medical history of Endometriosis, Headache, Hypertension, and Osteoarthritis. Past Surgical History:  has a past surgical history that includes knee surgery (Bilateral); rhinoplasty; Breast surgery; Abdomen surgery; Wrist ganglion excision; Hysterectomy, vaginal; Tonsillectomy; and Neck surgery. Social History:  reports that she quit smoking about 20 months ago. Her smoking use included cigarettes. She smoked 0.25 packs per day. She has never used smokeless tobacco. She reports current alcohol use. She reports that she does not use drugs.   Family History: family history includes Cancer in her father; Hypertension in her father; Osteoporosis in her mother. Allergies: Aimovig [erenumab-aooe], Avelox [moxifloxacin], Charcoal activated [activated charcoal], Sulfa antibiotics, and Augmentin [amoxicillin-pot clavulanate]    Physical Exam         VS:  /84   Pulse 104   Temp 97.2 °F (36.2 °C) (Temporal)   Resp 17   Ht 5' 2\" (1.575 m)   Wt 111 lb (50.3 kg)   LMP 04/10/2018 (Exact Date)   SpO2 98%   BMI 20.30 kg/m²    Oxygen Saturation Interpretation: Normal.    Constitutional:  Alert, development consistent with age. NAD. Head:  NC/NT. Airway patent. Cerumen noted. Mouth: Posterior pharynx with mild erythema and clear postnasal drip. No tonsillar hypertrophy or exudate. Neck:  Normal ROM. Supple. No anterior cervical adenopathy noted. Lungs: CTAB without wheezes, rales, or rhonchi. CV:  Regular rate and rhythm, normal heart sounds, without pathological murmurs, ectopy, gallops, or rubs. Skin:  Normal turgor. Warm, dry, without visible rash. Lymphatic: No lymphangitis or adenopathy noted. Neurological:  Oriented. Motor functions intact. Lab / Imaging Results   (All laboratory and radiology results have been personally reviewed by myself)  Labs:  No results found for this visit on 05/03/22. Imaging: All Radiology results interpreted by Radiologist unless otherwise noted. No results found for this visit on 05/03/22. Assessment / Plan     Impression(s):  Jose A Patel was seen today for headache. Diagnoses and all orders for this visit:    Close exposure to COVID-19 virus  -     POCT COVID-19 Rapid, NAAT (-) in office today    Sinobronchitis  -     azithromycin (ZITHROMAX) 250 MG tablet; Take 1 tablet by mouth See Admin Instructions for 5 days 500mg on day 1 followed by 250mg on days 2 - 5    Antibiotic-induced yeast infection  -     fluconazole (DIFLUCAN) 150 MG tablet;  Take 1 tablet by mouth once for 1 dose  - Red Flag items & conservative methods discussed including OTC methods & Coricidin medication  - F/u with PCP if symptoms persist  - Work/school letter provided     Disposition:  Disposition: Discharge to home. Advised cautionary self-quarantine at home in the interim. Increase fluids and rest. Symptomatic relief discussed including Tylenol prn pain/fever. Schedule virtual f/u with PCP in 7-10 days if symptoms persist. ED sooner if symptoms worsen or change. ED immediately with high or refractory fever, progressive SOB, dyspnea, CP, calf pain/swelling, shaking chills, vomiting, abdominal pain, lethargy, flank pain, or decreased urinary output. Pt verbalizes understanding and is in agreement with plan of care. All questions answered. AUREA Weiner - CNP    **This report was transcribed using voice recognition software. Every effort was made to ensure accuracy; however, inadvertent computerized transcription errors may be present.

## 2022-05-03 NOTE — LETTER
[address] [place] [address] [place] [address]  Phone: [phone]  Fax: [phone]    AUREA Albert CNP        May 3, 2022     Patient: Duane Paiz   YOB: 1965   Date of Visit: 5/3/2022       To Whom It May Concern: It is my medical opinion that Cynthia Arteaga may return to work on 5/4/2022. Fredy Curling was tested for COVID & was negative today. If you have any questions or concerns, please don't hesitate to call.     Sincerely,        AUREA Albert CNP

## 2022-05-12 ENCOUNTER — TELEPHONE (OUTPATIENT)
Dept: PHYSICAL MEDICINE AND REHAB | Age: 57
End: 2022-05-12

## 2022-05-12 NOTE — TELEPHONE ENCOUNTER
Called and spoke with [facility] jeffst with [facility] specailty pharmacy to give verbal order for Botox 200 unit injection IM q 12 weeks #1 vial 5 refills.   Gave them the prior authorization number and goes under medical.

## 2022-05-12 NOTE — TELEPHONE ENCOUNTER
Called and spoke with the patient to inform her that [facility] specialty pharmacy does not provide the Botox anymore and [facility] a [facility] so we will cancel her next visit until we get her authorization fixed. Patient is aware and voiced understanding.

## 2022-05-12 NOTE — TELEPHONE ENCOUNTER
Called and spoke with [facility] specialty pharmacy and was told a new script needed tried to call was disconnected. Called [facility] back and they stated they no longer supply Botox.

## 2022-05-12 NOTE — TELEPHONE ENCOUNTER
Called and spoke with Jose Miguel gerardo Trumann prior authorization for Botox. CPT code 06459 need a pre-determination pending authorization # [id number], call reference # [id number] fax clinicals to [phone]. Clinical notes faxed.

## 2022-05-12 NOTE — TELEPHONE ENCOUNTER
Called and spoke with Valencia Noble from [facility] prior authorization department to start authorization for  pending authorization # [id number] and will approval within 24-72 hrs.

## 2022-05-17 ENCOUNTER — TELEPHONE (OUTPATIENT)
Dept: PHYSICAL MEDICINE AND REHAB | Age: 57
End: 2022-05-17

## 2022-05-17 NOTE — TELEPHONE ENCOUNTER
Patient called and wants trigger point injections. Can I go ahead and schedule her or does she need to wait?

## 2022-05-19 ENCOUNTER — TELEPHONE (OUTPATIENT)
Dept: ADMINISTRATIVE | Age: 57
End: 2022-05-19

## 2022-05-19 NOTE — TELEPHONE ENCOUNTER
Called and spoke with the patient to inform her that once we receive the botox we will call her to schedule.

## 2022-05-19 NOTE — TELEPHONE ENCOUNTER
Patient pharmacy advised her botox will be delivered on 5/24. Patient requesting appointment.  Please advise

## 2022-05-25 ENCOUNTER — OFFICE VISIT (OUTPATIENT)
Dept: PHYSICAL MEDICINE AND REHAB | Age: 57
End: 2022-05-25
Payer: COMMERCIAL

## 2022-05-25 VITALS
BODY MASS INDEX: 19.14 KG/M2 | HEIGHT: 62 IN | WEIGHT: 104 LBS | SYSTOLIC BLOOD PRESSURE: 125 MMHG | DIASTOLIC BLOOD PRESSURE: 85 MMHG | TEMPERATURE: 97.3 F | HEART RATE: 93 BPM

## 2022-05-25 DIAGNOSIS — G43.709 CHRONIC MIGRAINE WITHOUT AURA WITHOUT STATUS MIGRAINOSUS, NOT INTRACTABLE: ICD-10-CM

## 2022-05-25 PROCEDURE — 64615 CHEMODENERV MUSC MIGRAINE: CPT | Performed by: PHYSICAL MEDICINE & REHABILITATION

## 2022-05-25 PROCEDURE — 20553 NJX 1/MLT TRIGGER POINTS 3/>: CPT | Performed by: PHYSICAL MEDICINE & REHABILITATION

## 2022-05-25 RX ORDER — PROMETHAZINE HYDROCHLORIDE 25 MG/1
25 TABLET ORAL EVERY 6 HOURS PRN
Qty: 40 TABLET | Refills: 2 | Status: SHIPPED
Start: 2022-05-25 | End: 2022-07-20 | Stop reason: SDUPTHER

## 2022-05-25 RX ORDER — BUTALBITAL, ACETAMINOPHEN AND CAFFEINE 300; 40; 50 MG/1; MG/1; MG/1
1 CAPSULE ORAL 2 TIMES DAILY PRN
Qty: 60 CAPSULE | Refills: 2 | Status: SHIPPED
Start: 2022-05-25 | End: 2022-10-14 | Stop reason: SDUPTHER

## 2022-05-25 RX ORDER — TOPIRAMATE 50 MG/1
50 TABLET, FILM COATED ORAL 2 TIMES DAILY
Qty: 180 TABLET | Refills: 3 | Status: SHIPPED | OUTPATIENT
Start: 2022-05-25

## 2022-05-25 RX ORDER — PROMETHAZINE HYDROCHLORIDE 12.5 MG/1
12.5 SUPPOSITORY RECTAL EVERY 6 HOURS PRN
Qty: 40 SUPPOSITORY | Refills: 2 | Status: SHIPPED | OUTPATIENT
Start: 2022-05-25 | End: 2022-06-24

## 2022-05-25 RX ORDER — GABAPENTIN 300 MG/1
300 CAPSULE ORAL 3 TIMES DAILY
Qty: 90 CAPSULE | Refills: 2 | Status: SHIPPED
Start: 2022-05-25 | End: 2022-08-31 | Stop reason: SDUPTHER

## 2022-05-25 RX ORDER — LIDOCAINE HYDROCHLORIDE 10 MG/ML
8 INJECTION, SOLUTION INFILTRATION; PERINEURAL ONCE
Status: COMPLETED | OUTPATIENT
Start: 2022-05-25 | End: 2022-05-26

## 2022-05-25 RX ORDER — ZOLMITRIPTAN 5 MG/1
TABLET, FILM COATED ORAL
Qty: 6 TABLET | Refills: 5 | Status: SHIPPED | OUTPATIENT
Start: 2022-05-25

## 2022-05-25 NOTE — PROGRESS NOTES
Nicko Walker D.O. [facility]  [address] [place]  Phone: [phone]  Fax: [phone]    5/25/2022    Chief Complaint   Patient presents with    Migraine     trigger point injection and Botox 200 units        · Informed Consent:  The indications, risks, benefits, and  alternatives of onabotulinum toxin A were discussed with the patient. I explained to the patient the potential side effects including but not limited to: distant spread of toxin effect causing droopy eyelids, facial drooping, neck pain, headache, double vision, muscle pain/spasm/weakness/stiffness,  bronchitis,  injection site pain, increased blood pressure, hypersensitivity, anaphylaxis, difficulty swallowing, difficulty speaking, urinary incontinence and breathing difficulty. The patient is aware that swallowing and breathing difficulties can be life threatening and there have been reports of death in some cases where onabotulinum toxin was injected. The patient was advised of the expected benefit to include less headache days per month, and the anticipated duration of effect of 3 months. A permit was signed and scanned into the media. · Last injection:2/25/22  · Taking anticoagulants/antiplatelets: No  · Diabetic: No  · Febrile/active infection: No    · Procedure note: After obtaining verbal and written consent from the patient for injection of  onabotulinum toxin A the patient agreed to proceed. 200 units of onabotulinum toxin A was reconstituted using 4 cc of preservative free normal saline ( 5 units per 0.1 ml). The medication was injected using a 30 g 0.5\" needle and a 1 cc syringe. The skin was prepared with Betadine.   Using a no touch technique, the injections were carried out at the following sites: bilateral Temporalis 40 units divided at 8 sites, bilateral Occipitalis 30 units divided at 6 sites, bilateral cervical paraspinals 20 units divided at 4 sites, and bilateral trapezius 60 units divided at 6 sites. The total was 150 units and as a result there was 50 units of unavoidable waste Adequate hemostasis was obtained. Ice was applied for 20 minutes post injection. The patient tolerated the procedure well. There were no complications. The patient was educated in post-procedure care including ice 20 minutes on/20 minutes off prn pain/bruising/swelling, advised to avoid hats and rubbing the forehead for 1-2 days and to call if any fevers chills, night sweats, drainage, increased pain, weakness, difficulty breathing or swallowing. Patient advised to expect improvements in about 2 weeks.  follow up 6 weeks      Caridad Pickett D.O., P.T. Board Certified Physical Medicine and Rehabilitation  Board Certified [address] [place] [address] [address]. [address] [facility]. [facility]  [address] [facility]. [address] [place] [address]  Phone: [phone]  Fax: [phone]    5/25/2022    Chief Complaint   Patient presents with    Migraine     trigger point injection and Botox 200 units        Last injection: 4/8/22  Taking anticoagulants/antiplatelets: No  Diabetic: No  Febrile/active infection: No    Ambulatory Procedure Time Out  Correct Patient: Yes  Correct Procedure: Yes  Correct Site/Side: Yes  Correct Site(s) Marked: Yes  Informed Consent Signed: Yes  Allergies Verified: Yes  Staff Present & Credential[de-identified] Darling Garcia 71    After explaining the indications, risks, benefits and alternatives of a Bilateral trapezius, bilateral rhomboid trigger point, the patient agreed to proceed. A permit was signed and scanned into the chart. The patient was placed in the  seated position. The skin over the trigger point was prepared with alcohol. Using aseptic no touch technique, a 22 gauge, 1 1/2\" needle with 8 cc of Xylocaine 1% was directed into the trigger point.   After negative aspiration, 2 cc of the medication was injected in a fanning out method at each of 4 trigger points. Adequate hemostasis was achieved and a bandage applied. The patient tolerated the procedure well and was educated in post injection care. The patient was clinically monitored after the injection and left the office without incident. There was post-injection reduction in pain and improved range of motion. Shae Baker D.O., P.T.   Board Certified Physical Medicine and Rehabilitation  Board Certified Electrodiagnostic Medicine

## 2022-05-26 RX ADMIN — LIDOCAINE HYDROCHLORIDE 8 ML: 10 INJECTION, SOLUTION INFILTRATION; PERINEURAL at 08:21

## 2022-05-27 ENCOUNTER — TELEPHONE (OUTPATIENT)
Dept: PHYSICAL MEDICINE AND REHAB | Age: 57
End: 2022-05-27

## 2022-05-27 NOTE — TELEPHONE ENCOUNTER
Susan Pop from [facility] called and stated that 53216 code does not require PA. He stated that if the [facility] was approved that the 57322 was automatically approved.   Reference# [id number]

## 2022-06-03 ENCOUNTER — NURSE ONLY (OUTPATIENT)
Dept: PHYSICAL MEDICINE AND REHAB | Age: 57
End: 2022-06-03
Payer: COMMERCIAL

## 2022-06-03 DIAGNOSIS — G43.709 CHRONIC MIGRAINE WITHOUT AURA WITHOUT STATUS MIGRAINOSUS, NOT INTRACTABLE: Primary | ICD-10-CM

## 2022-06-03 PROCEDURE — 96372 THER/PROPH/DIAG INJ SC/IM: CPT | Performed by: PHYSICAL MEDICINE & REHABILITATION

## 2022-06-03 RX ORDER — KETOROLAC TROMETHAMINE 15 MG/ML
15 INJECTION, SOLUTION INTRAMUSCULAR; INTRAVENOUS ONCE
Status: COMPLETED | OUTPATIENT
Start: 2022-06-03 | End: 2022-06-03

## 2022-06-03 RX ADMIN — KETOROLAC TROMETHAMINE 15 MG: 15 INJECTION, SOLUTION INTRAMUSCULAR; INTRAVENOUS at 11:36

## 2022-06-03 NOTE — PROGRESS NOTES
Patient here complaining of right sided neck pain and was ordered torodol 15 mg IM injection today. Patient correctly identified and torodol 15mg IM administered in right dorsal gluteal muscle and bandaid applied, will call Monday if no relief from torodol.

## 2022-06-06 ENCOUNTER — TELEPHONE (OUTPATIENT)
Dept: PHYSICAL MEDICINE AND REHAB | Age: 57
End: 2022-06-06

## 2022-06-06 NOTE — TELEPHONE ENCOUNTER
Patient called in and left phone voicemail stating that torodol injection did not help, still has \"tightness\" along right side of neck and pain, requesting an appointment for \"blocks\" or trigger point or something? ? Please advise   Thanks

## 2022-06-13 ENCOUNTER — OFFICE VISIT (OUTPATIENT)
Dept: PHYSICAL MEDICINE AND REHAB | Age: 57
End: 2022-06-13
Payer: COMMERCIAL

## 2022-06-13 ENCOUNTER — EVALUATION (OUTPATIENT)
Dept: PHYSICAL THERAPY | Age: 57
End: 2022-06-13
Payer: COMMERCIAL

## 2022-06-13 VITALS
DIASTOLIC BLOOD PRESSURE: 86 MMHG | BODY MASS INDEX: 20.2 KG/M2 | SYSTOLIC BLOOD PRESSURE: 128 MMHG | WEIGHT: 109.8 LBS | HEART RATE: 106 BPM | HEIGHT: 62 IN

## 2022-06-13 DIAGNOSIS — M47.812 SPONDYLOSIS OF CERVICAL REGION WITHOUT MYELOPATHY OR RADICULOPATHY: ICD-10-CM

## 2022-06-13 DIAGNOSIS — M54.2 TRIGGER POINT OF NECK: ICD-10-CM

## 2022-06-13 DIAGNOSIS — G43.709 CHRONIC MIGRAINE WITHOUT AURA WITHOUT STATUS MIGRAINOSUS, NOT INTRACTABLE: Primary | ICD-10-CM

## 2022-06-13 DIAGNOSIS — M79.18 MYOFASCIAL PAIN DYSFUNCTION SYNDROME: ICD-10-CM

## 2022-06-13 DIAGNOSIS — G43.709 CHRONIC MIGRAINE WITHOUT AURA WITHOUT STATUS MIGRAINOSUS, NOT INTRACTABLE: ICD-10-CM

## 2022-06-13 DIAGNOSIS — M54.81 BILATERAL OCCIPITAL NEURALGIA: Primary | ICD-10-CM

## 2022-06-13 DIAGNOSIS — Z98.1 S/P CERVICAL SPINAL FUSION: ICD-10-CM

## 2022-06-13 PROCEDURE — 20552 NJX 1/MLT TRIGGER POINT 1/2: CPT | Performed by: PHYSICAL MEDICINE & REHABILITATION

## 2022-06-13 PROCEDURE — 64405 NJX AA&/STRD GR OCPL NRV: CPT | Performed by: PHYSICAL MEDICINE & REHABILITATION

## 2022-06-13 PROCEDURE — 97162 PT EVAL MOD COMPLEX 30 MIN: CPT | Performed by: PHYSICAL THERAPIST

## 2022-06-13 PROCEDURE — 99214 OFFICE O/P EST MOD 30 MIN: CPT | Performed by: PHYSICAL MEDICINE & REHABILITATION

## 2022-06-13 PROCEDURE — 97110 THERAPEUTIC EXERCISES: CPT | Performed by: PHYSICAL THERAPIST

## 2022-06-13 RX ORDER — ALMOTRIPTAN 6.25 MG/1
6.25 TABLET, FILM COATED ORAL
Qty: 10 TABLET | Refills: 2 | Status: SHIPPED
Start: 2022-06-13 | End: 2022-08-25 | Stop reason: DRUGHIGH

## 2022-06-13 RX ORDER — LIDOCAINE HYDROCHLORIDE 10 MG/ML
2 INJECTION, SOLUTION INFILTRATION; PERINEURAL ONCE
Status: COMPLETED | OUTPATIENT
Start: 2022-06-13 | End: 2022-06-14

## 2022-06-13 RX ORDER — BUPIVACAINE HYDROCHLORIDE 2.5 MG/ML
4 INJECTION, SOLUTION INFILTRATION; PERINEURAL ONCE
Status: COMPLETED | OUTPATIENT
Start: 2022-06-13 | End: 2022-06-14

## 2022-06-13 NOTE — PROGRESS NOTES
Shae Baker D.O. [place] [facility]   [facility] [address] [facility] [place]  Phone: [phone]  Fax: [phone]       2022    Chief Complaint   Patient presents with    Neck Pain     follow up neck pain        Shaq Jones is a 55 y.o.  right hand dominant woman seen today in follow up of headache. Interval history: Since the last visit the patient reports she has had a continuous headache since her Botox injection 22. Her stress level has been up due to attending college classes. Now, the pain is constant. There were 20 headache days in the last month. The patient used 20 rescue medications per month. The pain is rated Pain Score:   4, is described as shooting, and is located in the neck with radiation to the top of the head. The symptoms have been worse since onset. The pain is better with ice. The pain is worse with lights, sounds. There is not aura. As a part of today's visit, I have reviewed the following medical records: prior office notes, Botox injection procedure note. An independent historian was not needed to obtain history.      Past Medical History:   Diagnosis Date    Endometriosis     Headache     Hypertension     Osteoarthritis        Past Surgical History:   Procedure Laterality Date    ABDOMEN SURGERY      X2 Laproscopic for endometriosis    BREAST SURGERY      HYSTERECTOMY, VAGINAL      KNEE SURGERY Bilateral     X5 on each    NECK SURGERY      RHINOPLASTY      TONSILLECTOMY      WRIST GANGLION EXCISION      LEFT       Social History     Tobacco Use    Smoking status: Former Smoker     Packs/day: 0.25     Types: Cigarettes     Quit date: 2020     Years since quittin.8    Smokeless tobacco: Never Used   Vaping Use    Vaping Use: Never used   Substance Use Topics    Alcohol use: Yes     Comment: socially    Drug use: Never       Family History   Problem Relation Age of Onset    Osteoporosis Mother     Hypertension Father     Cancer Father        Current Outpatient Medications   Medication Sig Dispense Refill    almotriptan (AXERT) 6.25 MG tablet Take 1 tablet by mouth once as needed for Migraine may repeat in 2 hours if needed 10 tablet 2    topiramate (TOPAMAX) 50 mg tablet Take 1 tablet by mouth 2 times daily 180 tablet 3    gabapentin (NEURONTIN) 300 MG capsule Take 1 capsule by mouth 3 times daily for 30 days.  90 capsule 2    ZOLMitriptan (ZOMIG) 5 MG tablet TAKE 1 TABLET BY MOUTH AS NEEDED FOR MIGRAINE 6 tablet 5    promethazine (PHENERGAN) 25 MG tablet Take 1 tablet by mouth every 6 hours as needed for Nausea 40 tablet 2    promethazine (PHENERGAN) 12.5 MG suppository Place 1 suppository rectally every 6 hours as needed for Nausea 40 suppository 2    butalbital-APAP-caffeine (FIORICET) -40 MG CAPS per capsule Take 1 capsule by mouth 2 times daily as needed for Headaches 60 capsule 2    loratadine (CLARITIN) 10 MG capsule Take 10 mg by mouth daily      mupirocin (BACTROBAN) 2 % ointment       cyclobenzaprine (FLEXERIL) 10 MG tablet Take 1 tablet by mouth 2 times daily as needed for Muscle spasms (may cause drowsiness) (Patient taking differently: Take 10 mg by mouth 2 times daily as needed for Muscle spasms (may cause drowsiness) ) 180 tablet 3    pantoprazole (PROTONIX) 40 MG tablet TAKE 1 TABLET BY MOUTH EVERY DAY      meloxicam (MOBIC) 15 MG tablet TAKE 1/2 TABLET BY MOUTH DAILY      triamcinolone (KENALOG) 0.1 % cream APPLY TO AFFECTED AREA TWICE A DAY AS NEEDED FOR THREE WEEKS WITH FLARES      acetaminophen (TYLENOL) 500 MG tablet Acetaminophen TYLENOL EXTRA STRENGTH 500 MG TABS take as directed on bottle as needed 2020/08/05 ACETAMINOPHEN [phone] Aayush Epps MD 08-  Lisa ([id number])     Harris Greaser Succinate (REYVOW) 50 MG TABS Take 50 mg by mouth daily as needed (headache) 10 tablet 2    Onabotulinumtoxin A (BOTOX) 200 units injection Inject 155 Units into the muscle every 30 days      hydrochlorothiazide (MICROZIDE) 12.5 MG capsule Take 12.5 mg by mouth daily. No current facility-administered medications for this visit. Allergies   Allergen Reactions    Aimovig [Erenumab-Aooe] Swelling    Avelox [Moxifloxacin]     Charcoal Activated [Activated Charcoal] Hives    Sulfa Antibiotics     Augmentin [Amoxicillin-Pot Clavulanate] Rash     ROS: No new weakness, paresthesia, incontinence of bowel or bladder, falls or gait dysfunction. Physical Exam: Blood pressure 128/86, pulse (!) 106, height 5' 2\" (1.575 m), weight 109 lb 12.8 oz (49.8 kg), last menstrual period 04/10/2018. General: The patient is in no apparent distress. Body habitus is non-obese. HEENT: No rhinorrhea, sneezing, yawning, or lacrimation. No scleral icterus or conjunctival injection. SKIN: No piloerection. No track marks. No rash. Normal turgor. No erythema or ecchymosis. Psychological: Mood and affect are appropriate. Hygiene is appropriate. Cardiovascular:  Heart is regular rate and rhythm. Peripheral pulses are 2+ at the dorsalis pedis, posterior tibial and radial arteries. There is no edema. Respiratory: Respirations are regular and unlabored. There is no cyanosis. Lymphatic: There is no cervical or inguinal lymphadenopathy. Gastrointestinal: Soft abdomen, non-tender. No pulsating abdominal mass. Genitourinary: No costovertebral angle tenderness. MSK: Trigger point present right trapezius. Reproduction of headache with palpation of bilateral occipital notch. Spurling negative. Neurologic: Awake, alert and oriented in three planes. Speech is fluent. No facial weakness. Tongue is midline. Hearing is intact for conversation. Pupils are equal and round. Extraocular muscles are intact. Hearing is intact for conversation. Shoulder shrug symmetric.  Sensation: Intact for light touch and pin prick in all upper and lower extremity dermatomes. Vibration and proprioception are intact at the bilateral first MTP. Strength: 5/5 in all myotomes in the upper and lower extremities. Muscle Tendon Reflexes: 2+ symmetric in the bilateral upper and lower extremities. Babinski is downgoing bilaterally. Gleen Medal is negative bilaterally. Gait is Normal.   Romberg is negative. Heel and toe walk are normal.  Tandem walk is normal.  No clonus or spasticity. The patient was able to rise from a chair and squat without difficulty. There is no tremor. Impression:   1. Bilateral occipital neuralgia    2. Trigger point of neck    3. Chronic migraine without aura without status migrainosus, not intractable    4. Spondylosis of cervical region without myelopathy or radiculopathy        Plan: This has included the decision for minor procedure: Trigger point injection and bilateral occipital nerve block. Procedure risk factors were discussed.      Prescription drug management has included:     Orders Placed This Encounter   Medications    almotriptan (AXERT) 6.25 MG tablet     Sig: Take 1 tablet by mouth once as needed for Migraine may repeat in 2 hours if needed     Dispense:  10 tablet     Refill:  2      Medications Discontinued During This Encounter   Medication Reason    hydrOXYzine (VISTARIL) 25 MG capsule Therapy completed    budesonide (PULMICORT) 0.5 MG/2ML nebulizer suspension Therapy completed    methylPREDNISolone (MEDROL DOSEPACK) 4 MG tablet Therapy completed    promethazine (PHENERGAN) 25 MG tablet Therapy completed    PROMETHEGAN 12.5 MG suppository Therapy completed    estradiol (ESTRACE) 0.1 MG/GM vaginal cream Therapy completed    omeprazole (PRILOSEC) 20 MG delayed release capsule Therapy completed    hydrOXYzine (ATARAX) 25 MG tablet Therapy completed    ondansetron (ZOFRAN-ODT) 4 MG disintegrating tablet LIST CLEANUP    NONFORMULARY Therapy completed    gabapentin (NEURONTIN) 100 MG capsule DOSE ADJUSTMENT    nystatin (MYCOSTATIN) 472843 UNIT/GM cream DOSE ADJUSTMENT    azelastine (ASTELIN) 0.1 % nasal spray Therapy completed    Azelastine-Fluticasone 137-50 MCG/ACT SUSP Therapy completed    amoxicillin (AMOXIL) 500 MG capsule Therapy completed    brompheniramine-pseudoephedrine-DM 2-30-10 MG/5ML syrup Therapy completed     Medications prescribed do not require monitoring for toxicity. Diagnosis and treatment were significantly impacted by social determinants of health including significant psychosocial stressors.  The patient was educated about the diagnosis, prognosis, indications, risks and benefits of treatment. An opportunity to ask questions was given to the patient and questions were answered. The patient agreed to proceed with the recommended treatment as described above. Return in about 6 weeks (around 7/25/2022). Thank you for allowing me to participate in the care of your patient. Velasquez Carpio D.O., P.T. Board Certified Physical Medicine and Rehabilitation  Board Certified [address] [place] [address]. [place] [address], [address] [place] [address]. [place] [facility]  [facility] [facility]. [address] [place] [address]  Phone: [phone]  Fax: [phone]    6/13/2022    Chief Complaint   Patient presents with    Neck Pain     follow up neck pain        Last injection: 5/25/22  Taking anticoagulants/antiplatelets: No  Diabetic: No  Febrile/active infection: No    Ambulatory Procedure Time Out  Correct Patient: Yes  Correct Procedure: Yes  Correct Site/Side: Yes  Correct Site(s) Marked: Yes  Informed Consent Signed: Yes  Allergies Verified: Yes  Staff Present & Credential[de-identified] Darling Garcia 70    After explaining the indications, risks, benefits and alternatives of a Right trapezius trigger point, the patient agreed to proceed. A permit was signed and scanned into the chart. The patient was placed in the  seated position.  The skin over the trigger point was prepared with alcohol. Using aseptic no touch technique, a 22 gauge, 1 1/2\" needle with 2 cc of Xylocaine 1% was directed into the trigger point. After negative aspiration, the medication was injected in a fanning out method. Adequate hemostasis was achieved and a bandage applied. The patient tolerated the procedure well and was educated in post injection care. The patient was clinically monitored after the injection and left the office without incident. There was post-injection reduction in pain and improved range of motion. Cornelio Almazan D.O., P.T. Board Certified Physical Medicine and Rehabilitation  Board Certified [address] [place] [address] [address] [facility] [address], [address] [facility]. [place] [facility]  [facility]. Formerly named [facility]  Phone: [phone]  Fax: [phone]    6/13/2022    Chief Complaint   Patient presents with    Neck Pain     follow up neck pain        Last injection: 1/19/22  Taking anticoagulants/antiplatelets: No  Diabetic: No  Febrile/active infection: No    Ambulatory Procedure Time Out  Correct Patient: Yes  Correct Procedure: Yes  Correct Site/Side: Yes  Correct Site(s) Marked: Yes  Informed Consent Signed: Yes  Allergies Verified: Yes  Staff Present & Credential[de-identified] Darling Garcia 70    After explaining the indications, risks, benefits and alternatives of a Bilateral occipital nerve block, the patient agreed to proceed. A permit was signed and scanned into the media. The patient was placed in the seated position. The skin was prepared with alcohol. Using an aseptic, no touch technique, a 25 gauge, 5/8\" needle with 2 cc of Bupivicaine 0.25% was directed to the occipital notch at the point of maximal tenderness. After negative aspiration, the medication was injected was injected. Adequate hemostasis was obtained. The patient tolerated the procedure well and was educated in post injection care.   The patient was monitored clinically and left the office without incident. Pain was reduced post-injection. Herlinda Rodas D.O., P.T.   Board Certified Physical Medicine and Rehabilitation  Board Certified Electrodiagnostic Medicine

## 2022-06-13 NOTE — PROGRESS NOTES
Physical Therapy Treatment Note    Date: 2022  Patient Name: Lois Vega  : 1965   MRN: [id number]  DOInjury: chronic  DOSx: --  Referring Provider: Tanesha Thurston, [address] [place] [address] [place] [address],  [facility]     Medical Diagnosis:      Diagnosis Orders   1. Chronic migraine without aura without status migrainosus, not intractable     2. Myofascial pain dysfunction syndrome     3. Spondylosis of cervical region without myelopathy or radiculopathy     4. S/P cervical spinal fusion         Access Code: [email or web address]  URL: [email or web address]  Date: 2022  Prepared by: Marshall Encinas    Exercises  Seated Assisted Cervical Rotation with Towel - 1 x daily - 5 reps - 5 sec hold  Supine Head Nod Deep Neck Flexor Training - 1 x daily - 2 sets - 10 reps - 3 sec hold    X = TO BE PERFORMED NEXT VISIT  > = PROGRESS TO THIS    S: See eval  O:   Time 4583-8905     Visit  Repeat outcome measure at mid point and end. Pain mod     ROM Dec rotation      Modalities Use sparingly if at all. Prefer an active program.     MH + ES   MO   Traction    MO         Manual      Cervical soft tissue mobilization    MT   ROM      Chin tucks   NR   Shrugs AROM   TE   C1-2 SNAG 5 sec x 5 L and R  TE      TE      TE         Exercise      Supine nod 2 x 10     Supine neck flexion  X  NR         Planks             ROWS: H X  TE   ROWS: M  Reach and Pull X  TE   ROWS: L   Reach and Pull X  TE   Tubing Pushes   TE   Corebuilder    NR   Shoulder ER   TE   Shrugs   TE   Front raise / shoulder flexion   TE   Lateral raise / shoulder abduction   TE               A:  Tolerated well. Discussed anatomy, physiology, body mechanics, principles of loading, and progressive loading/activity. Reviewed home exercise program extensively; written copy provided.      P: Continue with rehab plan  Marshall Encinas, PT    Treatment Charges: Mins Units   Initial Evaluation 30 1   Re-Evaluation     Ther Exercise         TE 10 1 Manual Therapy     MT     Ther Activities        TA     Gait Training          GT     Neuro Re-education NR     Modalities     Non-Billable Service Time     Other     Total Time/Units 40 2

## 2022-06-13 NOTE — PROGRESS NOTES
[facility] OUTPATIENT REHABILITATION  PHYSICAL THERAPY INITIAL EVALUATION         Date:  2022   Patient: Tony Culver  : 1965  MRN: [id number]  Referring Provider: Jenelle Johnson [address] [place], [place] [address]Leonides [address] [place] [address]     Medical Diagnosis:      Diagnosis Orders   1. Chronic migraine without aura without status migrainosus, not intractable     2. Myofascial pain dysfunction syndrome     3. Spondylosis of cervical region without myelopathy or radiculopathy     4. S/P cervical spinal fusion         Physician Order: Eval and Treat     SUBJECTIVE:     Onset date: chronic    History / Mechanism of Injury: Patient reports chronic neck problems, polyarticular arthritis, and migraines. Patient had cervical fusion C5-7 1 1/2 years ago. Interventions for current problem: trigger point injections, Botox injections for chronic migraine. Chief complaint: pain, decreased motion, weakness, limited ability to lift/carry/handle material, limited ability to complete home/outdoor chores/tasks, inability to participate in exercise regimen / fitness program, decreased endurance. Patient wants to be able to get back into pilates, yoga, planks, and similar body weight supported exercise. These are things she was doing until 2018 when things got worse for her.        Pain: Moderate to high    Provoking Activities/Positions:  repetitive arm movements, reaching to mop or sweep           Imaging results:       Narrative   LOCATION: 200       EXAM: MRI CERVICAL SPINE WO CONTRAST       COMPARISON: Plain film examination 2017.       HISTORY: M48.02 Cervical stenosis of spine.        TECHNIQUE: Multiplanar T1 and T2-weighted MRI images of the cervical   spine were performed.       CONTRAST: None.       FINDINGS:       Posterior fossa is normal. Bone marrow signal is normal. Prevertebral   soft tissues are normal. No epidural fluid collection.       The spinal cord fails to demonstrate signal alteration.       At the C5-C6 level there is a large broad-based diffuse disc bulge   which flattens the anterior thecal sac and likely contacts the cord   but does not cause a cord contour or signal abnormality.       Bilateral neuroforaminal narrowing is present at this level not   grossly contacting exiting nerve roots.       C6-7 also demonstrates a diffuse disc bulge not contacting the cord. Bilateral neuroforaminal narrowing is present left side greater than   right which may contact the exiting nerve root depending on the   patient's clinical presentation, side and level of radicular symptoms.       The remaining levels are unremarkable.           Impression   Disc disease C5-C7 as described. Narrative   Location: 200       Exam: XR CERVICAL SPINE MIN 4VW  01/20/2017       Comparison: None.       History: Left arm and hand pain.       Technique: AP, lateral, odontoid, bilateral oblique and swimmer's   views of the cervical spine are provided.       Findings: Degenerative straightening of the normal cervical lordosis. Severe disc space loss present at C5-C6 and C6-C7. Trace   retrolisthesis of C5 relative to C6. Bilateral uncovertebral   degenerative changes. No evidence of fracture. Prevertebral soft   tissues grossly normal. Moderately severe right-sided foraminal   stenosis C3-C4 and C5-C6. Mild stenosis present at C4-C5 and C6-C7. Severe left-sided foraminal stenosis C3-C4 through C6-C7. No acute   fracture.           Impression   1. Moderately severe right-sided foraminal stenosis C3-C4 and C5-C6.   2. Severe left-sided foraminal stenosis C3-C4 through C6-C7. 3. Severe disc space loss C5-C6 and C6-C7. 4. Degenerative straightening of normal cervical lordosis.          Past Medical History  Past Medical History:   Diagnosis Date    Endometriosis     Headache     Hypertension     Osteoarthritis      Past Surgical History:   Procedure Laterality Date    ABDOMINAL SURGERY X2 Laproscopic for endometriosis    BREAST SURGERY      HYSTERECTOMY, VAGINAL      KNEE SURGERY Bilateral     X5 on each    NECK SURGERY      RHINOPLASTY      TONSILLECTOMY      WRIST GANGLION EXCISION      LEFT       Medications:   Current Outpatient Medications   Medication Sig Dispense Refill    topiramate (TOPAMAX) 50 mg tablet Take 1 tablet by mouth 2 times daily 180 tablet 3    gabapentin (NEURONTIN) 300 MG capsule Take 1 capsule by mouth 3 times daily for 30 days.  90 capsule 2    ZOLMitriptan (ZOMIG) 5 MG tablet TAKE 1 TABLET BY MOUTH AS NEEDED FOR MIGRAINE 6 tablet 5    promethazine (PHENERGAN) 25 MG tablet Take 1 tablet by mouth every 6 hours as needed for Nausea 40 tablet 2    promethazine (PHENERGAN) 12.5 MG suppository Place 1 suppository rectally every 6 hours as needed for Nausea 40 suppository 2    butalbital-APAP-caffeine (FIORICET) -40 MG CAPS per capsule Take 1 capsule by mouth 2 times daily as needed for Headaches 60 capsule 2    hydrOXYzine (VISTARIL) 25 MG capsule TAKE 1 CAPSULE BY MOUTH THREE TIMES A DAY      amoxicillin (AMOXIL) 500 MG capsule TAKE 1 CAPSULE BY MOUTH 3 TIMES A DAY FOR 7 DAYS      budesonide (PULMICORT) 0.5 MG/2ML nebulizer suspension ADD ONE VIAL TO SINUS RINSES TWICE A DAY AS DIRECTED      methylPREDNISolone (MEDROL DOSEPACK) 4 MG tablet USE AS DIRECTED      brompheniramine-pseudoephedrine-DM 2-30-10 MG/5ML syrup TAKE 1 TSP BY MOUTH EVERY 4 TO 6 HOURS      loratadine (CLARITIN) 10 MG capsule Take 10 mg by mouth daily      mupirocin (BACTROBAN) 2 % ointment       Azelastine-Fluticasone 137-50 MCG/ACT SUSP       promethazine (PHENERGAN) 25 MG tablet       cyclobenzaprine (FLEXERIL) 10 MG tablet Take 1 tablet by mouth 2 times daily as needed for Muscle spasms (may cause drowsiness) (Patient taking differently: Take 10 mg by mouth 2 times daily as needed for Muscle spasms (may cause drowsiness) ) 180 tablet 3    gabapentin (NEURONTIN) 100 MG capsule Take 1 capsule by mouth 3 times daily for 30 days. 90 capsule 0    PROMETHEGAN 12.5 MG suppository PLACE 1 SUPPOSITORY RECTALLY EVERY 6 HOURS AS NEEDED FOR NAUSEA      pantoprazole (PROTONIX) 40 MG tablet TAKE 1 TABLET BY MOUTH EVERY DAY      meloxicam (MOBIC) 15 MG tablet TAKE 1/2 TABLET BY MOUTH DAILY      estradiol (ESTRACE) 0.1 MG/GM vaginal cream APPLY 1 GM IN THE VAGINA WEEKLY      triamcinolone (KENALOG) 0.1 % cream APPLY TO AFFECTED AREA TWICE A DAY AS NEEDED FOR THREE WEEKS WITH FLARES      azelastine (ASTELIN) 0.1 % nasal spray by Nasal route       omeprazole (PRILOSEC) 20 MG delayed release capsule TAKE 1 CAPSULE BY MOUTH TWICE A DAY      nystatin (MYCOSTATIN) 595586 UNIT/GM cream APPLY TO AFFECTED AREA 4 TIMES A DAY      hydrOXYzine (ATARAX) 25 MG tablet TAKE 1 TABLET BY MOUTH TWICE A DAY      acetaminophen (TYLENOL) 500 MG tablet Acetaminophen TYLENOL EXTRA STRENGTH 500 MG TABS take as directed on bottle as needed 2020/08/05 ACETAMINOPHEN [id number] Shell Coughlin MD 08-  Lisa ([id number])      ondansetron (ZOFRAN-ODT) 4 MG disintegrating tablet Take 1 tablet by mouth 3 times daily as needed for Nausea or Vomiting (Patient taking differently: Take 4 mg by mouth 3 times daily as needed for Nausea or Vomiting ) 21 tablet 0    Lasmiditan Succinate (REYVOW) 50 MG TABS Take 50 mg by mouth daily as needed (headache) 10 tablet 2    NONFORMULARY Take 8 mg by mouth 2 times daily CBD oil      Onabotulinumtoxin A (BOTOX) 200 units injection Inject 155 Units into the muscle every 30 days      hydrochlorothiazide (MICROZIDE) 12.5 MG capsule Take 12.5 mg by mouth daily. No current facility-administered medications for this visit. Occupation: teacher.      Patient Goals: pain relief, return to prior activity    Contraindications/Precautions: none    OBJECTIVE:     Estimated body mass index is 19.02 kg/m² as calculated from the following:    Height as of 5/25/22: 5' 2\" (1.575 m). Weight as of 5/25/22: 104 lb (47.2 kg). Observations: well nourished female, normal affect    Inspection: normal orthopedic exam cervical spine. Atrophy noted bilateral suprascapular fossa. Range of Motion:    Neck:    Flexion:  [x] Normal   [] Limited    Extension:  [x] Normal   [] Limited     Right Rotation: [] Normal   [x] Limited    Left Rotation:  [] Normal   [x] Limited    Right Side Bending: [x] Normal   [] Limited    Left Side Bending: [x] Normal   [] Limited     ROM limited 25% in R rotation , L rotation  due to stiffness. Upper Extremity:   Right:   [x] Normal   [] Limited    Left:   [x] Normal   [] Limited     Strength:     Neck: 2/5. Able to hold neck in flexion while supine 10 sec. Extension not impaired. R UE: 5/5   L UE: 5/5    Palpation: Tender to palpation today; had occipital block eariler today. .    Sensation: intact to light touch and temperature.     Special Tests:   [] Nerve Root Compression           Right []+ / [] -    Left []+ / [] -  [x] Cervical Distraction [x]+ / [] -    [x] Spurling's Test           Right []+ / [x] -    Left []+ / [x] -     [] Other: []+ / [] -           ASSESSMENT     Problems:   Pain mod  ROM decreased  Strength decreased   Limitations with IADLs , use of right upper extremity, use of left upper extremity, inability to participate in exercise regimen / fitness program    [] There are no barriers affecting plan of care or recovery    [x] Barriers to this patient's plan of care or recovery include: : chronic nature of problem     Domestic Concerns:  [x] No  [] Yes:    Short Term goals (3 weeks)   ROM WFL   Neck flexor endurance test 20 sec    Long Term goals (6 weeks)   Pain min   ROM Full   Strength 4/5    Neck flexor endurance test 40 sec   Able to perform / complete the following functions / tasks: planks, yoga, repetitive arms movements (daily chores) without triggering headache  Independent with home exercise program (HEP)    Rehab Potential: [x] Good  [] Fair  [] Poor    PLAN       Treatment Plan:   instruction in home exercise program   therapeutic exercise   therapeutic activity   neuromuscular re-education   manual therapy     The following CPT codes are likely to be used in the care of this patient:   46216 PT Evaluation: Moderate Complexity   19391 PT Re-Evaluation   72621 Therapeutic Exercise   34309 Neuromuscular Re-Education   87222 Therapeutic Activities   34550 Manual Therapy     Suggested Professional Referral: [x] No  [] Yes:     Patient Education:  [x] Plans / Goals, Risks / Benefits discussed  [x] Home exercise program  Method of Education: [x] Verbal  [x] Demo  [x] Written  Comprehension of Education:  [x] Verbalizes understanding. [x] Demonstrates understanding. [] Needs Review. [] Demonstrates / verbalizes understanding of HEP / Karishma Ospina previously given. Frequency:  1 day per week for 6 weeks    Patient understands diagnosis/prognosis and consents to treatment, plan and goals: [x] Yes    [] No     Thank you for the opportunity to work with your patient. If you have questions or comments, please contact me at [phone]; fax: [phone]. Electronically signed by: Gemma Chauhan, PT         Please sign Physician's Certification and return to: [address] [place] [address] PHYSICAL THERAPY  [address] [address]  Dept: [phone]  Dept Fax: [phone]: [phone] Certification / Comments     Frequency/Duration 1 day per week for 6 weeks. Certification period from 6/13/2022  to 9/13/2022. I have reviewed the Plan of Care established for skilled therapy services and certify that the services are required and that they will be provided while the patient is under my care.     Physician's Comments/Revisions:               Physician's Printed Name: Physician's Signature:                                                               Date:

## 2022-06-14 RX ADMIN — LIDOCAINE HYDROCHLORIDE 2 ML: 10 INJECTION, SOLUTION INFILTRATION; PERINEURAL at 11:17

## 2022-06-14 RX ADMIN — BUPIVACAINE HYDROCHLORIDE 10 MG: 2.5 INJECTION, SOLUTION INFILTRATION; PERINEURAL at 11:19

## 2022-06-20 ENCOUNTER — TREATMENT (OUTPATIENT)
Dept: PHYSICAL THERAPY | Age: 57
End: 2022-06-20
Payer: COMMERCIAL

## 2022-06-20 DIAGNOSIS — M47.812 SPONDYLOSIS OF CERVICAL REGION WITHOUT MYELOPATHY OR RADICULOPATHY: ICD-10-CM

## 2022-06-20 DIAGNOSIS — G43.709 CHRONIC MIGRAINE WITHOUT AURA WITHOUT STATUS MIGRAINOSUS, NOT INTRACTABLE: Primary | ICD-10-CM

## 2022-06-20 DIAGNOSIS — Z98.1 S/P CERVICAL SPINAL FUSION: ICD-10-CM

## 2022-06-20 DIAGNOSIS — M79.18 MYOFASCIAL PAIN DYSFUNCTION SYNDROME: ICD-10-CM

## 2022-06-20 PROCEDURE — 97112 NEUROMUSCULAR REEDUCATION: CPT | Performed by: PHYSICAL THERAPIST

## 2022-06-20 NOTE — PROGRESS NOTES
Physical Therapy Treatment Note    Date: 2022  Patient Name: Lois Vega  : 1965   MRN: [id number]  DOInjury: chronic  DOSx: --  Referring Provider: Tanesha Thurston, [address] [place] [address] [place] [address],  [facility]     Medical Diagnosis:      Diagnosis Orders   1. Chronic migraine without aura without status migrainosus, not intractable     2. Myofascial pain dysfunction syndrome     3. Spondylosis of cervical region without myelopathy or radiculopathy     4. S/P cervical spinal fusion         Access Code: [email or web address]  URL: [email or web address]  Date: 2022  Prepared by: Earlean Ce    Exercises  Seated Assisted Cervical Rotation with Towel - 1 x daily - 5 reps - 5 sec hold  Supine Deep Neck Flexor Training - Repetitions - 3 x weekly - 3 sets - 8-10 reps - 2 sec hold - 1-2 min rest between sets  Staggered Stance Single Arm Row with Anchored Resistance - 3 x weekly - 2-3 sets - 10-15 reps  Split Stance Shoulder Row with Resistance - 3 x weekly - 2-3 sets - 10-15 reps      X = TO BE PERFORMED NEXT VISIT  > = PROGRESS TO THIS    S: No new report. Performing HEP.      O:   Time [id number]     Visit  Repeat outcome measure at mid point and end. Pain mod     ROM Dec rotation      Modalities Use sparingly if at all. Prefer an active program.     MH + ES   MO   Traction    MO         Manual      Cervical soft tissue mobilization    MT   ROM      Chin tucks   NR   Shrugs AROM   TE   C1-2 SNAG 5 sec x 5 L and R  Can also do supine with hand  TE      TE      TE         Exercise      Supine nod  discontinued      Supine neck flexion  3 x 8, 2 sec hold, 2 min rest between sets   NR         Planks  X           ROWS: H Red 2 x 15     ROWS: M  Reach and Pull Red 2 x 15     ROWS: L   Reach and Pull X  TE   Tubing Pushes   TE   Corebuilder    NR   Shoulder ER   TE   Shrugs   TE   Front raise / shoulder flexion   TE   Lateral raise / shoulder abduction   TE               A:  Tolerated well. Feedback and cues necessary for developing neuromuscular control. Movement education and guided movement interventions used to improve performance and control.       P: Continue with rehab plan  Ge Hamilton PT    Treatment Charges: Mins Units   Initial Evaluation     Re-Evaluation     Ther Exercise         TE     Manual Therapy     MT     Ther Activities        TA     Gait Training          GT     Neuro Re-education NR 40 3   Modalities     Non-Billable Service Time     Other     Total Time/Units 40 3

## 2022-06-28 ENCOUNTER — TREATMENT (OUTPATIENT)
Dept: PHYSICAL THERAPY | Age: 57
End: 2022-06-28
Payer: COMMERCIAL

## 2022-06-28 DIAGNOSIS — M47.812 SPONDYLOSIS OF CERVICAL REGION WITHOUT MYELOPATHY OR RADICULOPATHY: ICD-10-CM

## 2022-06-28 DIAGNOSIS — G43.709 CHRONIC MIGRAINE WITHOUT AURA WITHOUT STATUS MIGRAINOSUS, NOT INTRACTABLE: Primary | ICD-10-CM

## 2022-06-28 DIAGNOSIS — Z98.1 S/P CERVICAL SPINAL FUSION: ICD-10-CM

## 2022-06-28 DIAGNOSIS — M79.18 MYOFASCIAL PAIN DYSFUNCTION SYNDROME: ICD-10-CM

## 2022-06-28 PROCEDURE — 97110 THERAPEUTIC EXERCISES: CPT | Performed by: PHYSICAL THERAPIST

## 2022-06-28 NOTE — PROGRESS NOTES
Physical Therapy Treatment Note    Date: 2022  Patient Name: Melinda Rico  : 1965   MRN: [id number]  DOInjury: chronic  DOSx: --  Referring Provider: Jay Rooney, [address] [place], [place] [address] ,  [facility]     Medical Diagnosis:      Diagnosis Orders   1. Chronic migraine without aura without status migrainosus, not intractable     2. Myofascial pain dysfunction syndrome     3. Spondylosis of cervical region without myelopathy or radiculopathy     4. S/P cervical spinal fusion         Access Code: [email or web address]  URL: [email or web address]  Date: 2022  Prepared by: Renate Sheffield    Exercises  Seated Assisted Cervical Rotation with Towel - 1 x daily - 5 reps - 5 sec hold  Supine Deep Neck Flexor Training - Repetitions - 3 x weekly - 3 sets - 8-10 reps - 2 sec hold - 1-2 min rest between sets  Staggered Stance Single Arm Row with Anchored Resistance - 3 x weekly - 2-3 sets - 10-15 reps  Split Stance Shoulder Row with Resistance - 3 x weekly - 2-3 sets - 10-15 reps  Standing Floor Level Row - 3 x weekly - 2-3 sets - 10-15 reps  Standard Plank - 3 x weekly - 3 sets - 30 sec hold        X = TO BE PERFORMED NEXT VISIT  > = PROGRESS TO THIS    S: Has been performing HEP and increasing activity at home. Notes an increase in soreness, but it is manageable. She also notes a knot at R scapula. This is a common spot for her, but manageable. She has been using her muscle stick to loosen this area. O:   Time [id number]     Visit 3/6 Repeat outcome measure at mid point and end. Pain mod     ROM Dec rotation      Modalities Use sparingly if at all.   Prefer an active program.     MH + ES   MO   Traction    MO         Manual      Cervical soft tissue mobilization    MT   ROM      Chin tucks   NR   Shrugs AROM   TE   C1-2 SNAG 5 sec x 5 L and R  Can also do supine with hand  TE      TE      TE         Exercise      Supine nod  discontinued      Supine neck flexion  3 x 8, 2 sec hold, 2 min rest between sets   NR         Planks  3 x 25 sec           ROWS: H     ROWS: M       ROWS: L  l Red 3 x 12  TE   Tubing Pushes   TE   Corebuilder    NR   Shoulder ER   TE   Shrugs   TE   Front raise / shoulder flexion   TE   Lateral raise / shoulder abduction   TE         Red band  Given           A:  Altered with new exercise today; tolerated well.        P: Continue with rehab plan  Ge Hamilton PT    Treatment Charges: Mins Units   Initial Evaluation     Re-Evaluation     Ther Exercise         TE 40 3   Manual Therapy     MT     Ther Activities        TA     Gait Training          GT     Neuro Re-education NR     Modalities     Non-Billable Service Time     Other     Total Time/Units 40 3

## 2022-07-01 ENCOUNTER — TELEPHONE (OUTPATIENT)
Dept: PHYSICAL THERAPY | Age: 57
End: 2022-07-01

## 2022-07-01 NOTE — TELEPHONE ENCOUNTER
Returned patient call about exercise selection in presence of migraine. We decided she would perform the exercises she enjoys the most (planks) and limit or reduce the others (rows). We agreed this allowed and exercise stimulus in a dose that would likely not exacerbate her migraine. Patient is to call with further questions.

## 2022-07-11 ENCOUNTER — TELEPHONE (OUTPATIENT)
Dept: PHYSICAL THERAPY | Age: 57
End: 2022-07-11

## 2022-07-20 ENCOUNTER — OFFICE VISIT (OUTPATIENT)
Dept: PHYSICAL MEDICINE AND REHAB | Age: 57
End: 2022-07-20
Payer: COMMERCIAL

## 2022-07-20 VITALS
SYSTOLIC BLOOD PRESSURE: 114 MMHG | DIASTOLIC BLOOD PRESSURE: 84 MMHG | HEIGHT: 62 IN | HEART RATE: 94 BPM | BODY MASS INDEX: 19.69 KG/M2 | RESPIRATION RATE: 18 BRPM | WEIGHT: 107 LBS

## 2022-07-20 DIAGNOSIS — M54.2 TRIGGER POINT OF NECK: Primary | ICD-10-CM

## 2022-07-20 DIAGNOSIS — G43.709 CHRONIC MIGRAINE WITHOUT AURA WITHOUT STATUS MIGRAINOSUS, NOT INTRACTABLE: ICD-10-CM

## 2022-07-20 PROCEDURE — 20553 NJX 1/MLT TRIGGER POINTS 3/>: CPT | Performed by: PHYSICAL MEDICINE & REHABILITATION

## 2022-07-20 PROCEDURE — 99214 OFFICE O/P EST MOD 30 MIN: CPT | Performed by: PHYSICAL MEDICINE & REHABILITATION

## 2022-07-20 RX ORDER — IPRATROPIUM BROMIDE 42 UG/1
SPRAY, METERED NASAL
COMMUNITY
Start: 2022-07-19

## 2022-07-20 RX ORDER — BUTALBITAL, ACETAMINOPHEN AND CAFFEINE 300; 40; 50 MG/1; MG/1; MG/1
1 CAPSULE ORAL 2 TIMES DAILY PRN
Qty: 60 CAPSULE | Refills: 2 | Status: SHIPPED | OUTPATIENT
Start: 2022-07-20

## 2022-07-20 RX ORDER — LIDOCAINE HYDROCHLORIDE 10 MG/ML
6 INJECTION, SOLUTION INFILTRATION; PERINEURAL ONCE
Status: COMPLETED | OUTPATIENT
Start: 2022-07-20 | End: 2022-07-20

## 2022-07-20 RX ORDER — PROMETHAZINE HYDROCHLORIDE 25 MG/1
25 TABLET ORAL EVERY 6 HOURS PRN
Qty: 40 TABLET | Refills: 2 | Status: SHIPPED
Start: 2022-07-20 | End: 2022-10-14 | Stop reason: SDUPTHER

## 2022-07-20 RX ADMIN — LIDOCAINE HYDROCHLORIDE 6 ML: 10 INJECTION, SOLUTION INFILTRATION; PERINEURAL at 14:29

## 2022-07-20 NOTE — PROGRESS NOTES
Tiesha Banegas D.O. [facility]  [address] [facility] [place]  Phone: [phone]  Fax: [phone]       7/20/2022    Chief Complaint   Patient presents with    Follow-up     Follow up after Botox for migraines. Had 17 days of migraines in a row afterwards. Pain level today 4       Virginia Barrios is a 55 y.o.  right hand dominant woman seen today in follow up of headache. Interval history: Since the last visit the patient reports she has had 17 days of headaches after the Botox injection 5/25/22. She is currently in PT for her neck and is not sure if that is what triggers her headaches. She started acupuncture. GI did EGD and diagnosed with peptic ulcer and was taken off Mobic and her symptoms have significantly increased since then. Now, the pain is constant. There were 20 headache days in the last month. The patient used 20 rescue medications per month. The pain is rated Pain Score:   4, is described as shooting, and is located in the neck with radiation to the top of the head. The symptoms have been worse since onset. The pain is better with ice. The pain is worse with lights, sounds. There is not aura. HIT 6 is 66. As a part of today's visit, I have reviewed the following medical records: prior office notes, Botox injection procedure note. An independent historian was not needed to obtain history.      Past Medical History:   Diagnosis Date    Endometriosis     Headache     Hypertension     Osteoarthritis     Peptic ulcer        Past Surgical History:   Procedure Laterality Date    ABDOMEN SURGERY      X2 Laproscopic for endometriosis    BREAST SURGERY      HYSTERECTOMY, VAGINAL      KNEE SURGERY Bilateral     X5 on each    NECK SURGERY      RHINOPLASTY      TONSILLECTOMY      WRIST GANGLION EXCISION      LEFT       Social History     Tobacco Use    Smoking status: Former     Packs/day: 0.25     Types: Cigarettes     Quit date: 2020     Years since quittin.9    Smokeless tobacco: Never   Vaping Use    Vaping Use: Never used   Substance Use Topics    Alcohol use: Yes     Comment: socially    Drug use: Never       Family History   Problem Relation Age of Onset    Osteoporosis Mother     Hypertension Father     Cancer Father        Current Outpatient Medications   Medication Sig Dispense Refill    ipratropium (ATROVENT) 0.06 % nasal spray       butalbital-APAP-caffeine (FIORICET) -40 MG CAPS per capsule Take 1 capsule by mouth 2 times daily as needed for Headaches 60 capsule 2    promethazine (PHENERGAN) 25 MG tablet Take 1 tablet by mouth every 6 hours as needed for Nausea 40 tablet 2    topiramate (TOPAMAX) 50 mg tablet Take 1 tablet by mouth 2 times daily 180 tablet 3    gabapentin (NEURONTIN) 300 MG capsule Take 1 capsule by mouth 3 times daily for 30 days. 90 capsule 2    ZOLMitriptan (ZOMIG) 5 MG tablet TAKE 1 TABLET BY MOUTH AS NEEDED FOR MIGRAINE 6 tablet 5    butalbital-APAP-caffeine (FIORICET) -40 MG CAPS per capsule Take 1 capsule by mouth 2 times daily as needed for Headaches 60 capsule 2    loratadine (CLARITIN) 10 MG capsule Take 10 mg by mouth daily      cyclobenzaprine (FLEXERIL) 10 MG tablet Take 1 tablet by mouth 2 times daily as needed for Muscle spasms (may cause drowsiness) 180 tablet 3    pantoprazole (PROTONIX) 40 MG tablet 2 times daily      acetaminophen (TYLENOL) 500 MG tablet Acetaminophen TYLENOL EXTRA STRENGTH 500 MG TABS take as directed on bottle as needed  ACETAMINOPHEN [phone] Geena Ricci MD 2020  Lisa ([id number])      Onabotulinumtoxin A (BOTOX, COSMETIC,) 200 units injection Inject 155 Units into the muscle every 30 days      hydrochlorothiazide (MICROZIDE) 12.5 MG capsule Take 12.5 mg by mouth daily.       almotriptan (AXERT) 6.25 MG tablet Take 1 tablet by mouth once as needed for Migraine may repeat in 2 hours if needed (Patient not taking: Reported on 7/20/2022) 10 tablet 2    mupirocin (BACTROBAN) 2 % ointment  (Patient not taking: Reported on 7/20/2022)      meloxicam (MOBIC) 15 MG tablet TAKE 1/2 TABLET BY MOUTH DAILY (Patient not taking: Reported on 7/20/2022)      triamcinolone (KENALOG) 0.1 % cream APPLY TO AFFECTED AREA TWICE A DAY AS NEEDED FOR THREE WEEKS WITH FLARES (Patient not taking: Reported on 7/20/2022)      Lasmiditan Succinate (REYVOW) 50 MG TABS Take 50 mg by mouth daily as needed (headache) (Patient not taking: Reported on 7/20/2022) 10 tablet 2     No current facility-administered medications for this visit. Allergies   Allergen Reactions    Aimovig [Erenumab-Aooe] Swelling    Avelox [Moxifloxacin]     Charcoal Activated [Activated Charcoal] Hives    Sulfa Antibiotics     Augmentin [Amoxicillin-Pot Clavulanate] Rash     ROS: No new weakness, paresthesia, incontinence of bowel or bladder, falls or gait dysfunction. Physical Exam: Blood pressure 114/84, pulse 94, resp. rate 18, height 5' 2\" (1.575 m), weight 107 lb (48.5 kg), last menstrual period 04/10/2018. General: The patient is in no apparent distress. Body habitus is non-obese. HEENT: No rhinorrhea, sneezing, yawning, or lacrimation. No scleral icterus or conjunctival injection. SKIN: No piloerection. No track marks. No rash. Normal turgor. No erythema or ecchymosis. Psychological: Mood and affect are appropriate. Hygiene is appropriate. Cardiovascular:  Heart is regular rate and rhythm. Peripheral pulses are 2+ at the dorsalis pedis, posterior tibial and radial arteries. There is no edema. Respiratory: Respirations are regular and unlabored. There is no cyanosis. Lymphatic: There is no cervical or inguinal lymphadenopathy. Gastrointestinal: Soft abdomen, non-tender. No pulsating abdominal mass. Genitourinary: No costovertebral angle tenderness. MSK: Trigger point present right trapezius.  X3 Reproduction of headache with palpation of bilateral occipital notch. Spurling negative. Neurologic: Awake, alert and oriented in three planes. Speech is fluent. No facial weakness. Tongue is midline. Hearing is intact for conversation. Pupils are equal and round. Extraocular muscles are intact. Hearing is intact for conversation. Shoulder shrug symmetric. Sensation: Intact for light touch and pin prick in all upper and lower extremity dermatomes. Vibration and proprioception are intact at the bilateral first MTP. Strength: 5/5 in all myotomes in the upper and lower extremities. Muscle Tendon Reflexes: 2+ symmetric in the bilateral upper and lower extremities. Babinski is downgoing bilaterally. Dori Arbon is negative bilaterally. Gait is Normal.   Romberg is negative. Heel and toe walk are normal.  Tandem walk is normal.  No clonus or spasticity. The patient was able to rise from a chair and squat without difficulty. There is no tremor. Impression:   1. Trigger point of neck    2. Chronic migraine without aura without status migrainosus, not intractable        Plan: This has included the decision for minor procedure: Trigger point injection . Procedure risk factors were discussed. Prescription drug management has included:     Orders Placed This Encounter   Medications    butalbital-APAP-caffeine (FIORICET) -40 MG CAPS per capsule     Sig: Take 1 capsule by mouth 2 times daily as needed for Headaches     Dispense:  60 capsule     Refill:  2    promethazine (PHENERGAN) 25 MG tablet     Sig: Take 1 tablet by mouth every 6 hours as needed for Nausea     Dispense:  40 tablet     Refill:  2    lidocaine 1 % injection 6 mL        Medications Discontinued During This Encounter   Medication Reason    promethazine (PHENERGAN) 25 MG tablet REORDER       Medications prescribed do not require monitoring for toxicity.      Diagnosis and treatment were significantly impacted by social determinants of health including significant psychosocial stressors. Continue acupuncture  Hold on Botox and see if withdrawal results in increase headaches. The patient was educated about the diagnosis, prognosis, indications, risks and benefits of treatment. An opportunity to ask questions was given to the patient and questions were answered. The patient agreed to proceed with the recommended treatment as described above. No follow-ups on file. Thank you for allowing me to participate in the care of your patient. Evin Gomez D.O., P.T. Board Certified Physical Medicine and Rehabilitation  Board Certified [address] [place], [place] [address]a. [facility] [facility]. [facility]  [facility] [facility]  Phone: [phone]  Fax: [phone]    7/20/2022    Chief Complaint   Patient presents with    Follow-up     Follow up after Botox for migraines. Had 17 days of migraines in a row afterwards. Pain level today 4       Last injection: 5/25/22  Taking anticoagulants/antiplatelets: No  Diabetic: No  Febrile/active infection: No         After explaining the indications, risks, benefits and alternatives of a Right trapezius trigger points X3, the patient agreed to proceed. A permit was signed and scanned into the chart. The patient was placed in the  seated position. The skin over the trigger point was prepared with alcohol. Using aseptic no touch technique, a 22 gauge, 1 1/2\" needle with 6 cc of Xylocaine 1% was directed into the trigger point. After negative aspiration, 2 cc of the medication was injected in a fanning out method at each of 3 trigger points  Adequate hemostasis was achieved and a bandage applied. The patient tolerated the procedure well and was educated in post injection care. The patient was clinically monitored after the injection and left the office without incident. There was post-injection reduction in pain and improved range of motion.        Evin Gomez D.O., P.T.  Board Certified Physical Medicine and Rehabilitation  Board Certified Electrodiagnostic Medicine    Administrations This Visit       lidocaine 1 % injection 6 mL       Admin Date  07/20/2022  14:29 Action  Given Dose  6 mL Route  Other Site   Administered By  Amber Hyde RN    Ordering Provider: DO Anayeli Coronado [address]: [id number]    Lot#: [id number]    : [employer] [facility]    Patient Supplied?: No

## 2022-07-26 ENCOUNTER — TREATMENT (OUTPATIENT)
Dept: PHYSICAL THERAPY | Age: 57
End: 2022-07-26
Payer: COMMERCIAL

## 2022-07-26 DIAGNOSIS — Z98.1 S/P CERVICAL SPINAL FUSION: ICD-10-CM

## 2022-07-26 DIAGNOSIS — M47.812 SPONDYLOSIS OF CERVICAL REGION WITHOUT MYELOPATHY OR RADICULOPATHY: ICD-10-CM

## 2022-07-26 DIAGNOSIS — G43.709 CHRONIC MIGRAINE WITHOUT AURA WITHOUT STATUS MIGRAINOSUS, NOT INTRACTABLE: Primary | ICD-10-CM

## 2022-07-26 DIAGNOSIS — M79.18 MYOFASCIAL PAIN DYSFUNCTION SYNDROME: ICD-10-CM

## 2022-07-26 PROCEDURE — 97140 MANUAL THERAPY 1/> REGIONS: CPT | Performed by: PHYSICAL THERAPIST

## 2022-07-26 NOTE — PROGRESS NOTES
Physical Therapy Treatment Note    Date: 2022  Patient Name: Mariana Streeter  : 1965   MRN: [id number]  DOInjury: chronic  DOSx: --  Referring Provider: Rupesh Aleman, [place] [place] [address] Leonides Smith ,  [facility]        Medical Diagnosis:      Diagnosis Orders   1. Chronic migraine without aura without status migrainosus, not intractable        2. Myofascial pain dysfunction syndrome        3. Spondylosis of cervical region without myelopathy or radiculopathy        4. S/P cervical spinal fusion            Access Code: [email or web address]  URL: [email or web address]  Date: 2022  Prepared by: Vini Robb    Exercises  Seated Assisted Cervical Rotation with Towel - 1 x daily - 5 reps - 5 sec hold  Supine Deep Neck Flexor Training - Repetitions - 3 x weekly - 3 sets - 8-10 reps - 2 sec hold - 1-2 min rest between sets  Staggered Stance Single Arm Row with Anchored Resistance - 3 x weekly - 2-3 sets - 10-15 reps  Split Stance Shoulder Row with Resistance - 3 x weekly - 2-3 sets - 10-15 reps  Standing Floor Level Row - 3 x weekly - 2-3 sets - 10-15 reps  Standard Plank - 3 x weekly - 3 sets - 30 sec hold        X = TO BE PERFORMED NEXT VISIT  > = PROGRESS TO THIS    S: Saw Dr. Jeb Izaguirre and had trigger point injections 22. She also has had 2 acupuncture appointments since she was last in PT. She reports they went well, and she went several days without a migraine. She is in a migraine today. She returns for another acupuncture appt tomorrow. She has been doing some light recovery yoga; and has been doing planks every other day -- reports she feels better after both. She reports sense of tightness in area of upper trap / levator. O:   Time 8822-9049     Visit  Repeat outcome measure at mid point and end. Pain mod     ROM Dec rotation      Modalities Use sparingly if at all.   Prefer an active program.     MH + ES   MO   Traction    MO         Manual      Cervical soft tissue mobilization with PROM, stretching, and massage / manual therapy X 30 min   MT   ROM      Chin tucks   NR   Shrugs AROM   TE   C1-2 SNAG  TE     TE     TE        Exercise     Supine nod     Supine neck flexion   NR        Planks           ROWS: H     ROWS: M       ROWS: L  l  TE   Tubing Pushes   TE   Corebuilder    NR   Shoulder ER   TE   Shrugs   TE   Front raise / shoulder flexion   TE   Lateral raise / shoulder abduction   TE         Red band  Given           A:  Performed manual therapy today due to migraine. Reported feeling better post session. P: Discussed exercise optiosn. Will work on exercise next session.     Sisi Herzog, PT    Treatment Charges: Mins Units   Initial Evaluation     Re-Evaluation     Ther Exercise         TE     Manual Therapy     MT 30 2   Ther Activities        TA     Gait Training          GT     Neuro Re-education NR     Modalities     Non-Billable Service Time 5 0   Other     Total Time/Units 30 2

## 2022-07-27 ENCOUNTER — OFFICE VISIT (OUTPATIENT)
Dept: FAMILY MEDICINE CLINIC | Age: 57
End: 2022-07-27
Payer: COMMERCIAL

## 2022-07-27 VITALS
BODY MASS INDEX: 19.69 KG/M2 | HEART RATE: 94 BPM | HEIGHT: 62 IN | TEMPERATURE: 97.2 F | WEIGHT: 107 LBS | RESPIRATION RATE: 17 BRPM | DIASTOLIC BLOOD PRESSURE: 81 MMHG | SYSTOLIC BLOOD PRESSURE: 121 MMHG | OXYGEN SATURATION: 98 %

## 2022-07-27 DIAGNOSIS — L03.011 FELON OF FINGER OF RIGHT HAND: ICD-10-CM

## 2022-07-27 DIAGNOSIS — L03.011 CELLULITIS OF FINGER OF RIGHT HAND: Primary | ICD-10-CM

## 2022-07-27 PROCEDURE — 99213 OFFICE O/P EST LOW 20 MIN: CPT | Performed by: NURSE PRACTITIONER

## 2022-07-27 RX ORDER — MUPIROCIN CALCIUM 20 MG/G
CREAM TOPICAL
Qty: 1 EACH | Refills: 1 | Status: SHIPPED | OUTPATIENT
Start: 2022-07-27 | End: 2022-08-26

## 2022-07-27 RX ORDER — DOXYCYCLINE HYCLATE 100 MG
100 TABLET ORAL 2 TIMES DAILY
Qty: 14 TABLET | Refills: 0 | Status: SHIPPED | OUTPATIENT
Start: 2022-07-27 | End: 2022-08-03

## 2022-07-27 NOTE — PROGRESS NOTES
Chief Complaint       Finger Injury (Index finger on right hand infected)    History of Present Illness   Source of history provided by:  patient. Emilee Glass is a 55 y.o. old female presenting to the walk in clinic for evaluation of right index finger pain. Reports associated swelling and denies current discharge. Denies any paresthesias, obvious deformity, hand pain, wrist pain, weakness, fever, chills, N/V, or abrasions. Pt states there is increased pain with flexion and full extension. Has tried taking OTC with minimal symptomatic relief. Denies any hx of previous injuries or surgeries at the site. Was seen at Urgent Care & p5 days ago, did not place antibiotics at that time. Has been using finger splint when out. ROS    Pertinent positives and negatives are stated within HPI, all other systems reviewed and are negative. Past Medical History:  has a past medical history of Endometriosis, Headache, Hypertension, Osteoarthritis, and Peptic ulcer. Past Surgical History:  has a past surgical history that includes knee surgery (Bilateral); rhinoplasty; Breast surgery; Abdomen surgery; Wrist ganglion excision; Hysterectomy, vaginal; Tonsillectomy; and Neck surgery. Social History:  reports that she quit smoking about 23 months ago. Her smoking use included cigarettes. She smoked an average of .25 packs per day. She has never used smokeless tobacco. She reports current alcohol use. She reports that she does not use drugs. Family History: family history includes Cancer in her father; Hypertension in her father; Osteoporosis in her mother.    Allergies: Aimovig [erenumab-aooe], Avelox [moxifloxacin], Charcoal activated [activated charcoal], Sulfa antibiotics, and Augmentin [amoxicillin-pot clavulanate]    Physical Exam           Vitals:    07/27/22 1513   BP: 121/81   Pulse: 94   Resp: 17   Temp: 97.2 °F (36.2 °C)   SpO2: 98%     Oxygen Saturation Interpretation: Normal.    Constitutional:  Alert, accuracy; however, inadvertent computerized transcription errors may be present.

## 2022-08-01 ENCOUNTER — TELEPHONE (OUTPATIENT)
Dept: PHYSICAL THERAPY | Age: 57
End: 2022-08-01

## 2022-08-01 NOTE — TELEPHONE ENCOUNTER
Date: 2022       Patient Name: Montserrat Rosa  : 1965      MRN: [id number]    Patient canceled appointment scheduled for today  at 1120 due to no reason given to therapist .    Eber Marie, PT

## 2022-08-10 ENCOUNTER — TREATMENT (OUTPATIENT)
Dept: PHYSICAL THERAPY | Age: 57
End: 2022-08-10
Payer: COMMERCIAL

## 2022-08-10 DIAGNOSIS — G43.709 CHRONIC MIGRAINE WITHOUT AURA WITHOUT STATUS MIGRAINOSUS, NOT INTRACTABLE: Primary | ICD-10-CM

## 2022-08-10 DIAGNOSIS — M47.812 SPONDYLOSIS OF CERVICAL REGION WITHOUT MYELOPATHY OR RADICULOPATHY: ICD-10-CM

## 2022-08-10 DIAGNOSIS — Z98.1 S/P CERVICAL SPINAL FUSION: ICD-10-CM

## 2022-08-10 DIAGNOSIS — M79.18 MYOFASCIAL PAIN DYSFUNCTION SYNDROME: ICD-10-CM

## 2022-08-10 PROCEDURE — 97112 NEUROMUSCULAR REEDUCATION: CPT | Performed by: PHYSICAL THERAPIST

## 2022-08-10 NOTE — PROGRESS NOTES
Physical Therapy Treatment Note    Date: 8/10/2022  Patient Name: Hyacinth Glass  : 1965   MRN: [id number]  DOInjury: chronic  DOSx: --  Referring Provider: Kris Wong, [address] [place] [address] Leonides Moon ,  [facility]        Medical Diagnosis:      Diagnosis Orders   1. Chronic migraine without aura without status migrainosus, not intractable        2. Myofascial pain dysfunction syndrome        3. Spondylosis of cervical region without myelopathy or radiculopathy        4. S/P cervical spinal fusion            Access Code: [email or web address]  URL: [email or web address]  Date: 2022  Prepared by: Nathalia Dodd    Exercises  Seated Assisted Cervical Rotation with Towel - 1 x daily - 5 reps - 5 sec hold  Supine Deep Neck Flexor Training - Repetitions - 3 x weekly - 3 sets - 8-10 reps - 2 sec hold - 1-2 min rest between sets  Staggered Stance Single Arm Row with Anchored Resistance - 3 x weekly - 2-3 sets - 10-15 reps  Split Stance Shoulder Row with Resistance - 3 x weekly - 2-3 sets - 10-15 reps  Standing Floor Level Row - 3 x weekly - 2-3 sets - 10-15 reps  Standard Plank - 3 x weekly - 3 sets - 30 sec hold    Access Code: [email or web address]  URL: [email or web address]  Date: 08/10/2022  Prepared by: Nathalia Dodd    Exercises  Beginner Head Nod - 2-3 x weekly - 2-4 sets - 5 reps  Quadruped Cervical Retraction - 2-3 x weekly - 2-4 sets - 5 reps          X = TO BE PERFORMED NEXT VISIT  > = PROGRESS TO THIS    S: Was last in PT 2 weeks ago. Tends to develop migraines after PT, but does not want to avoid strengthening and exercise. Manual techniques used last Rx made her sore. The thing that provides the most relief of pain and migraines is acupuncture  O:   Time 8343-1628     Visit 5/6 Repeat outcome measure at mid point and end. Pain mod     ROM Dec rotation      Modalities Use sparingly if at all.   Prefer an active program.     MH + ES   MO   Traction    MO         Manual      Cervical soft tissue mobilization with PROM, stretching, and massage / manual therapy  MT   ROM      Chin tucks   NR   Shrugs AROM   TE   C1-2 SNAG  TE     TE     TE        Exercise     Supine nod 3 x 5     Supine neck flexion   NR   Quadruped neck motion from flexion to neutral  3 x 5     Planks           ROWS: H     ROWS: M       ROWS: L  l  TE   Tubing Pushes   TE   Corebuilder    NR   Shoulder ER   TE   Shrugs   TE   Front raise / shoulder flexion   TE   Lateral raise / shoulder abduction   TE         Red band  Given           A:  Spent time discussing PT, acupuncture, and her HEP. Patient encouraged to examine her dose of exercise. Currently, she does planks and rows because she wants to work on strength, however they often trigger migraines. Acupuncture consistently makes her feels better. Today we changed her HEP to the above exercises; which were low in frequency and intensity. Feedback and cues necessary for developing neuromuscular control. Movement education and guided movement interventions used to improve performance and control. P: Continue to examine the utility of PT vs other interventions such as acupuncture.    Alisia Daigle, PT    Treatment Charges: Mins Units   Initial Evaluation     Re-Evaluation     Ther Exercise         TE     Manual Therapy     MT     Ther Activities        TA     Gait Training          GT     Neuro Re-education NR 30 2   Modalities     Non-Billable Service Time     Other     Total Time/Units 30 2

## 2022-08-15 ENCOUNTER — TELEPHONE (OUTPATIENT)
Dept: PHYSICAL MEDICINE AND REHAB | Age: 57
End: 2022-08-15

## 2022-08-15 NOTE — TELEPHONE ENCOUNTER
Called and spoke with the patient and she stated that she wanted to hold off on getting Botox for now to see if the acupuncture will help.

## 2022-08-22 ENCOUNTER — TREATMENT (OUTPATIENT)
Dept: PHYSICAL THERAPY | Age: 57
End: 2022-08-22
Payer: COMMERCIAL

## 2022-08-22 DIAGNOSIS — M47.812 SPONDYLOSIS OF CERVICAL REGION WITHOUT MYELOPATHY OR RADICULOPATHY: ICD-10-CM

## 2022-08-22 DIAGNOSIS — Z98.1 S/P CERVICAL SPINAL FUSION: ICD-10-CM

## 2022-08-22 DIAGNOSIS — M79.18 MYOFASCIAL PAIN DYSFUNCTION SYNDROME: ICD-10-CM

## 2022-08-22 DIAGNOSIS — G43.709 CHRONIC MIGRAINE WITHOUT AURA WITHOUT STATUS MIGRAINOSUS, NOT INTRACTABLE: Primary | ICD-10-CM

## 2022-08-22 PROCEDURE — 97110 THERAPEUTIC EXERCISES: CPT | Performed by: PHYSICAL THERAPIST

## 2022-08-22 NOTE — PROGRESS NOTES
Prefer an active program.     MH + ES   MO   Traction    MO         Manual      Cervical soft tissue mobilization with PROM, stretching, and massage / manual therapy  MT   ROM      Chin tucks   NR   Shrugs AROM   TE   C1-2 SNAG  TE     TE     TE        Exercise     Supine nod 4 x 10     Supine neck flexion  May progress to this  NR   Quadruped neck motion from flexion to neutral  4 x 10     Planks  May progress to this          ROWS: H     ROWS: M       ROWS: L  l  TE   Tubing Pushes   TE   Corebuilder    NR   Shoulder ER   TE   Shrugs   TE   Front raise / shoulder flexion   TE   Lateral raise / shoulder abduction   TE         Red band  Given           A:  Opted to make 1 change -- increase from 3 sets to 4 sets. Other options (increasing load or progressing to deep neck flexor exercise and plank), were considered too much load for now.          P: Continue with rehab plan  Aundrea Brooks PT    Treatment Charges: Mins Units   Initial Evaluation     Re-Evaluation     Ther Exercise         TE 20 1   Manual Therapy     MT     Ther Activities        TA     Gait Training          GT     Neuro Re-education NR     Modalities     Non-Billable Service Time     Other     Total Time/Units 20 1

## 2022-08-24 ENCOUNTER — PATIENT MESSAGE (OUTPATIENT)
Dept: PHYSICAL MEDICINE AND REHAB | Age: 57
End: 2022-08-24

## 2022-08-24 NOTE — TELEPHONE ENCOUNTER
Patient called in requesting that you send a prescription of the 12.5mg axert for her migraine, patient having migraine today, states it takes two of the 6.25mg to help her  please advise thanks

## 2022-08-24 NOTE — TELEPHONE ENCOUNTER
From: Liam Campa  To: Dr. Roger Degroot: 8/24/2022 6:41 AM EDT  Subject: Axert Question     Good morning Dr. Char Nickerson,     First, I have been doing well with acupuncture. But as I approach what would be my next Botox treatment, Im getting more migraines. This is typical for me. I havent had any all month & then 3 in the last 3 days. About Axert, I am trying to read the package insert as I currently am trying to come out of a migraine since 2 am. Yesterday and the day before it required 2 axerts (1 pill then another 2 hours later) to break the migraine. Today Ive taken the two as prescribed, but the migraine still hangs on. I think I read in the PI that a max dose of 25 mg/day. 1) can I take a 3rd dose of Axert (6.25 mg) if absolutely necessary? 2) should I maybe be on the 12.5 mg dose to take less pills? 3) I know I discussed trying not to get Botox, and Im still in that mindset. Hopefully acupuncture breaks this on Thursday and trigger points provide some relief next week. See you all soon and take care.      Brennon Osborne

## 2022-08-25 RX ORDER — ALMOTRIPTAN 12.5 MG/1
12.5 TABLET, FILM COATED ORAL
Qty: 10 TABLET | Refills: 2 | Status: SHIPPED | OUTPATIENT
Start: 2022-08-25 | End: 2022-08-25

## 2022-08-29 ENCOUNTER — TELEPHONE (OUTPATIENT)
Dept: PHYSICAL THERAPY | Age: 57
End: 2022-08-29

## 2022-08-29 NOTE — TELEPHONE ENCOUNTER
Date: 2022       Patient Name: Deborah Adams  : 1965      MRN: [id number]    Patient canceled appointment scheduled for today  at 1500 due to no reason given to therapist .    Brenda Howell, PT

## 2022-08-30 ENCOUNTER — OFFICE VISIT (OUTPATIENT)
Dept: PHYSICAL MEDICINE AND REHAB | Age: 57
End: 2022-08-30
Payer: COMMERCIAL

## 2022-08-30 VITALS
BODY MASS INDEX: 20.06 KG/M2 | DIASTOLIC BLOOD PRESSURE: 91 MMHG | HEART RATE: 84 BPM | WEIGHT: 109 LBS | TEMPERATURE: 97.2 F | SYSTOLIC BLOOD PRESSURE: 129 MMHG | HEIGHT: 62 IN

## 2022-08-30 DIAGNOSIS — M54.2 TRIGGER POINT OF NECK: Primary | ICD-10-CM

## 2022-08-30 PROCEDURE — 20552 NJX 1/MLT TRIGGER POINT 1/2: CPT | Performed by: PHYSICAL MEDICINE & REHABILITATION

## 2022-08-31 RX ORDER — LIDOCAINE HYDROCHLORIDE 10 MG/ML
4 INJECTION, SOLUTION INFILTRATION; PERINEURAL ONCE
Status: COMPLETED | OUTPATIENT
Start: 2022-08-31 | End: 2022-08-31

## 2022-08-31 RX ORDER — GABAPENTIN 300 MG/1
300 CAPSULE ORAL 3 TIMES DAILY
Qty: 90 CAPSULE | Refills: 2 | Status: SHIPPED | OUTPATIENT
Start: 2022-08-31 | End: 2022-09-30

## 2022-08-31 RX ORDER — PROMETHAZINE HYDROCHLORIDE 25 MG/1
25 TABLET ORAL EVERY 6 HOURS PRN
Qty: 40 TABLET | Refills: 2 | Status: SHIPPED | OUTPATIENT
Start: 2022-08-31 | End: 2022-09-30

## 2022-08-31 RX ADMIN — LIDOCAINE HYDROCHLORIDE 4 ML: 10 INJECTION, SOLUTION INFILTRATION; PERINEURAL at 11:00

## 2022-09-07 ENCOUNTER — TREATMENT (OUTPATIENT)
Dept: PHYSICAL THERAPY | Age: 57
End: 2022-09-07
Payer: COMMERCIAL

## 2022-09-07 DIAGNOSIS — M79.18 MYOFASCIAL PAIN DYSFUNCTION SYNDROME: ICD-10-CM

## 2022-09-07 DIAGNOSIS — Z98.1 S/P CERVICAL SPINAL FUSION: ICD-10-CM

## 2022-09-07 DIAGNOSIS — M47.812 SPONDYLOSIS OF CERVICAL REGION WITHOUT MYELOPATHY OR RADICULOPATHY: ICD-10-CM

## 2022-09-07 DIAGNOSIS — G43.709 CHRONIC MIGRAINE WITHOUT AURA WITHOUT STATUS MIGRAINOSUS, NOT INTRACTABLE: Primary | ICD-10-CM

## 2022-09-07 PROCEDURE — 97110 THERAPEUTIC EXERCISES: CPT | Performed by: PHYSICAL THERAPIST

## 2022-09-07 NOTE — PROGRESS NOTES
Physical Therapy Treatment Note    Date: 2022  Patient Name: Judy Jennings  : 1965   MRN: [id number]  DOInjury: chronic  DOSx: --  Referring Provider: Trinh Moffett, [address] [place] [address]        Medical Diagnosis:      Diagnosis Orders   1. Chronic migraine without aura without status migrainosus, not intractable        2. Myofascial pain dysfunction syndrome        3. Spondylosis of cervical region without myelopathy or radiculopathy        4. S/P cervical spinal fusion            Access Code: [email or web address]  URL: [email or web address]  Date: 2022  Prepared by: Chava Reilly    Exercises  Seated Assisted Cervical Rotation with Towel - 1 x daily - 5 reps - 5 sec hold  Supine Deep Neck Flexor Training - Repetitions - 3 x weekly - 3 sets - 8-10 reps - 2 sec hold - 1-2 min rest between sets  Staggered Stance Single Arm Row with Anchored Resistance - 3 x weekly - 2-3 sets - 10-15 reps  Split Stance Shoulder Row with Resistance - 3 x weekly - 2-3 sets - 10-15 reps  Standing Floor Level Row - 3 x weekly - 2-3 sets - 10-15 reps  Standard Plank - 3 x weekly - 3 sets - 30 sec hold    Access Code: [email or web address]  URL: [email or web address]  Date: 08/10/2022  Prepared by: Juanell Sorrow    Exercises  Beginner Head Nod - 2-3 x weekly - 2-4 sets - 5 reps  Quadruped Cervical Retraction - 2-3 x weekly - 2-4 sets - 5 reps          X = TO BE PERFORMED NEXT VISIT  > = PROGRESS TO THIS    S: Had trigger point injections last week. Reports having trouble with a sinus infection; which has been causing headaches. Fortunately, her migraines have been pretty good. She returns for acupuncture 9/15/22. She has missed her HEP only 2 times since last visit on 22. O:   Time 1291-5140     Visit 7 Repeat outcome measure at mid point and end. Pain mod     ROM Dec rotation      Modalities Use sparingly if at all.   Prefer an active program.     MH + ES   MO   Traction    MO Manual      Cervical soft tissue mobilization with PROM, stretching, and massage / manual therapy  MT   ROM      Chin tucks   NR   Shrugs AROM   TE   C1-2 SNAG  TE     TE     TE        Exercise     Supine nod 4 x 10     Supine neck flexion  May progress to this  NR   Quadruped neck motion from flexion to neutral  4 x 10     Planks  May progress to this          ROWS: H Red 3 x 10     ROWS: M       ROWS: L  l  TE   Tubing Pushes   TE   Corebuilder    NR   Shoulder ER   TE   Shrugs   TE   Front raise / shoulder flexion   TE   Lateral raise / shoulder abduction   TE         Red band  Given           A:  Opted to make 1 change -- add High Row to HEP. Other options (increasing load or progressing to deep neck flexor exercise and plank), were considered too much load for now.          P: Continue with rehab plan  Fermin Fuller, PT    Treatment Charges: Mins Units   Initial Evaluation     Re-Evaluation     Ther Exercise         TE 40 3   Manual Therapy     MT     Ther Activities        TA     Gait Training          GT     Neuro Re-education NR     Modalities     Non-Billable Service Time     Other     Total Time/Units 40 3

## 2022-09-12 ENCOUNTER — TREATMENT (OUTPATIENT)
Dept: PHYSICAL THERAPY | Age: 57
End: 2022-09-12
Payer: COMMERCIAL

## 2022-09-12 DIAGNOSIS — Z98.1 S/P CERVICAL SPINAL FUSION: ICD-10-CM

## 2022-09-12 DIAGNOSIS — M79.18 MYOFASCIAL PAIN DYSFUNCTION SYNDROME: ICD-10-CM

## 2022-09-12 DIAGNOSIS — G43.709 CHRONIC MIGRAINE WITHOUT AURA WITHOUT STATUS MIGRAINOSUS, NOT INTRACTABLE: Primary | ICD-10-CM

## 2022-09-12 DIAGNOSIS — M47.812 SPONDYLOSIS OF CERVICAL REGION WITHOUT MYELOPATHY OR RADICULOPATHY: ICD-10-CM

## 2022-09-12 PROCEDURE — 97110 THERAPEUTIC EXERCISES: CPT | Performed by: PHYSICAL THERAPIST

## 2022-09-12 NOTE — PROGRESS NOTES
Physical Therapy Treatment Note    Date: 2022  Patient Name: Niall Cavanaugh  : 1965   MRN: [id number]  DOInjury: chronic  DOSx: --  Referring Provider: Aki Glynn, [address] [place] [address] [place] [address] [place] [address]        Medical Diagnosis:      Diagnosis Orders   1. Chronic migraine without aura without status migrainosus, not intractable        2. Myofascial pain dysfunction syndrome        3. Spondylosis of cervical region without myelopathy or radiculopathy        4. S/P cervical spinal fusion            Access Code: [email or web address]  URL: [email or web address]  Date: 2022  Prepared by: Kelly Pryor    Exercises  Seated Assisted Cervical Rotation with Towel - 1 x daily - 5 reps - 5 sec hold  Supine Deep Neck Flexor Training - Repetitions - 3 x weekly - 3 sets - 8-10 reps - 2 sec hold - 1-2 min rest between sets  Staggered Stance Single Arm Row with Anchored Resistance - 3 x weekly - 2-3 sets - 10-15 reps  Split Stance Shoulder Row with Resistance - 3 x weekly - 2-3 sets - 10-15 reps  Standing Floor Level Row - 3 x weekly - 2-3 sets - 10-15 reps  Standard Plank - 3 x weekly - 3 sets - 30 sec hold    Access Code: [email or web address]  URL: [email or web address]  Date: 08/10/2022  Prepared by: Kelly Konstantin    Exercises  Beginner Head Nod - 2-3 x weekly - 2-4 sets - 5 reps  Quadruped Cervical Retraction - 2-3 x weekly - 2-4 sets - 5 reps          X = TO BE PERFORMED NEXT VISIT  > = PROGRESS TO THIS    S: Was doing well until yesterday and this morning. She reports she was very active over the weekend. She cleaned out her closet and did other cleaning chores Saturday. Yesterday she ate foods that often cause problems for her (sugar). She wound up having a migraine last night and this morning; as a result she did not sleep. Today she feels pretty good; just tired. She returns for acupuncture 9/15/22. O:   Time [id number]     Visit 8 Repeat outcome measure at mid point and end.     Pain mod ROM Dec rotation      Modalities Use sparingly if at all. Prefer an active program.     MH + ES   MO   Traction    MO         Manual      Cervical soft tissue mobilization with PROM, stretching, and massage / manual therapy  MT   ROM      Chin tucks   NR   Shrugs AROM   TE   C1-2 SNAG  TE     TE     TE        Exercise     Supine nod 4 x 10     Supine neck flexion  May progress to this  NR   Quadruped neck motion from flexion to neutral  4 x 10     Planks  May progress to this          ROWS: H Red 3 x 10     ROWS: M   X. Add back in next. ROWS: L  l Red 3 x 10  TE   Tubing Pushes   TE   Corebuilder    NR   Shoulder ER   TE   Shrugs   TE   Front raise / shoulder flexion   TE   Lateral raise / shoulder abduction   TE         Red band  Given           A:  Opted to make 1 change -- add Mid Row to HEP. Other options (increasing load or progressing to deep neck flexor exercise and plank), were considered too much load for now.          P: Continue with rehab plan  Alisia Daigle, PT    Treatment Charges: Mins Units   Initial Evaluation     Re-Evaluation     Ther Exercise         TE 20 1   Manual Therapy     MT     Ther Activities        TA     Gait Training          GT     Neuro Re-education NR     Modalities     Non-Billable Service Time     Other     Total Time/Units 20 1

## 2022-09-19 NOTE — PROGRESS NOTES
Merrill Kearney D.O. [facility]   [address] [facility] [facility]  Phone: [phone]  Fax: [phone]       2022    Chief Complaint   Patient presents with    Neck Pain     6 week f/u botdami Garibay is a 55 y.o.  right hand dominant woman seen today in follow up of headache. Interval history: Since the last visit the patient reports she is feeling significantly better. She has found wheat to be a trigger for her headaches while she was re-introducing food after the elimination diet. She states her migraines are 50% less than in the fall. Now, the pain is intermittent. There were 8 headache days in the last month. The patient used 8 rescue medications per month. The pain is rated Pain Score:   3, is described as tight and aching, and is located in the occipital region with radiation to the frontal region. The symptoms have been better since onset. The pain is better with Fiorcet and Zomig. The pain is worse with lights, sounds. There is not aura. There is  nausea and vomiting. HIT-6 score was 40. An independent historian was not needed.       Past Medical History:   Diagnosis Date    Endometriosis     Headache     Hypertension     Osteoarthritis        Past Surgical History:   Procedure Laterality Date    ABDOMEN SURGERY      X2 Laproscopic for endometriosis    BREAST SURGERY      HYSTERECTOMY, VAGINAL      KNEE SURGERY Bilateral     X5 on each    NECK SURGERY      RHINOPLASTY      TONSILLECTOMY      WRIST GANGLION EXCISION      LEFT       Social History     Tobacco Use    Smoking status: Former Smoker     Packs/day: 0.25     Types: Cigarettes     Quit date: 2020     Years since quittin.6    Smokeless tobacco: Never Used   Vaping Use    Vaping Use: Never used   Substance Use Topics    Alcohol use: Yes     Comment: socially    Drug use: Never       Family History   Problem Relation Age of Onset    Vit B12 given in right arm. Lot   EXP 02/01/2024  NDC 26445-867-92  Tolerated well, no reaction while in office. 1 GM IN THE VAGINA WEEKLY      triamcinolone (KENALOG) 0.1 % cream APPLY TO AFFECTED AREA TWICE A DAY AS NEEDED FOR THREE WEEKS WITH FLARES      azelastine (ASTELIN) 0.1 % nasal spray by Nasal route       omeprazole (PRILOSEC) 20 MG delayed release capsule TAKE 1 CAPSULE BY MOUTH TWICE A DAY      nystatin (MYCOSTATIN) 019023 UNIT/GM cream APPLY TO AFFECTED AREA 4 TIMES A DAY      hydrOXYzine (ATARAX) 25 MG tablet TAKE 1 TABLET BY MOUTH TWICE A DAY      acetaminophen (TYLENOL) 500 MG tablet Acetaminophen TYLENOL EXTRA STRENGTH 500 MG TABS take as directed on bottle as needed 2020/08/05 ACETAMINOPHEN [id number] Brionna Umaña MD 08-  Lisa ([id number])      ondansetron (ZOFRAN-ODT) 4 MG disintegrating tablet Take 1 tablet by mouth 3 times daily as needed for Nausea or Vomiting (Patient taking differently: Take 4 mg by mouth 3 times daily as needed for Nausea or Vomiting ) 21 tablet 0    Lasmiditan Succinate (REYVOW) 50 MG TABS Take 50 mg by mouth daily as needed (headache) 10 tablet 2    NONFORMULARY Take 8 mg by mouth 2 times daily CBD oil      Onabotulinumtoxin A (BOTOX) 200 units injection Inject 155 Units into the muscle every 30 days      hydrochlorothiazide (MICROZIDE) 12.5 MG capsule Take 12.5 mg by mouth daily.  gabapentin (NEURONTIN) 300 MG capsule Take 1 capsule by mouth 3 times daily for 30 days. 90 capsule 2    gabapentin (NEURONTIN) 100 MG capsule Take 1 capsule by mouth 3 times daily for 30 days. 90 capsule 0     No current facility-administered medications for this visit. Allergies   Allergen Reactions    Aimovig [Erenumab-Aooe] Swelling    Avelox [Moxifloxacin]     Charcoal Activated [Activated Charcoal] Hives    Sulfa Antibiotics     Augmentin [Amoxicillin-Pot Clavulanate] Rash       ROS: No new weakness, paresthesia, incontinence of bowel or bladder, falls or gait dysfunction.      Physical Exam: Blood pressure 127/69, pulse 96, height 5' 2\" (1.575 m), weight 111 lb 3.2 oz (50.4 kg), last menstrual period 04/10/2018. General: The patient is in no apparent distress. Body habitus is non-obese. HEENT: No rhinorrhea, sneezing, yawning, or lacrimation. No scleral icterus or conjunctival injection. SKIN: No piloerection. No track marks. No rash. Normal turgor. No erythema or ecchymosis. Psychological: Mood and affect are appropriate. Hygiene is appropriate. Cardiovascular:  Heart is regular rate and rhythm. Peripheral pulses are 2+ at the dorsalis pedis, posterior tibial and radial arteries. There is no edema. MSK: There is no joint effusion, deformity, instability, swelling, erythema or warmth. AROM is full in the spine and extremities. Spinal curvatures are normal.   Trigger point present right trapezius. Neurologic:  No focal sensorimotor deficit. Reflexes 2+ and symmetric. Gait is normal.    Impression:   1. Chronic migraine without aura without status migrainosus, not intractable    2. Myofascial pain dysfunction syndrome    3. Spondylosis of cervical region without myelopathy or radiculopathy    4. Trigger point of neck        Plan: This has included the decision for minor procedure: Trigger point injection today. Procedure risk factors were discussed. Prescription drug management has included:     Orders Placed This Encounter   Medications    promethazine (PHENERGAN) 25 MG tablet     Sig: Take 1 tablet by mouth every 6 hours as needed for Nausea     Dispense:  40 tablet     Refill:  2    lidocaine 1 % injection 2 mL      Medications Discontinued During This Encounter   Medication Reason    promethazine (PHENERGAN) 25 MG tablet REORDER   Continue Zomig, Reyvow, Fiorcet, Topamax, Mobic, Flexeril, Gabapentin. Medications prescribed do not require monitoring for toxicity. Diagnosis and treatment were not significantly impacted by social determinants of health. Continue headache diary. Continue Botox.     Patient has a history of chronic daily migraine. The patient has failed 3 prophylactic medications. The patient has continued to have chronic migraine headaches for more than 15 days every month each time lasting more than four hours for longer than  4 months in duration. Will prior authorization for Botox migraine. Informed Consent:  The indications, risks, benefits, and  alternatives of onabotulinum toxin A were discussed with the patient. I explained to the patient the potential side effects including but not limited to: distant spread of toxin effect causing droopy eyelids, facial drooping, neck pain, headache, double vision, muscle pain/spasm/weakness/stiffness,  bronchitis,  injection site pain, increased blood pressure, hypersensitivity, anaphylaxis, difficulty swallowing, difficulty speaking, urinary incontinence and breathing difficulty. The patient is aware that swallowing and breathing difficulties can be life threatening and there have been reports of death in some cases where onabotulinum toxin was injected. The patient was advised of the expected benefit to include less headache days per month, and the anticipated duration of effect of 3 months.  The patient was educated about the diagnosis, prognosis, indications, risks and benefits of treatment. An opportunity to ask questions was given to the patient and questions were answered. The patient agreed to proceed with the recommended treatment as described above. Return in about 6 weeks (around 5/20/2022) for Botox injection. Thank you for allowing me to participate in the care of your patient. Justin Quevedo D.O., P.T. Board Certified Physical Medicine and Rehabilitation  Board Certified [facility]. [address] [facility]. [facility]  [address] [facility].  [facility]  Phone: [phone]  Fax: [phone]    4/11/2022    Chief Complaint   Patient presents with   Valencia Valenzuela Neck Pain     6 week f/u botox       Last injection: 3/2/22  Taking anticoagulants/antiplatelets: No  Diabetic: No  Febrile/active infection: No    Ambulatory Procedure Time Out  Correct Patient: Yes  Correct Procedure: Yes  Correct Site/Side: Yes  Correct Site(s) Marked: Yes  Informed Consent Signed: Yes  Allergies Verified: Yes  Staff Present & Credential[de-identified] Yazan JEFFERY/    After explaining the indications, risks, benefits and alternatives of a Right trapezius trigger point, the patient agreed to proceed. A permit was signed and scanned into the chart. The patient was placed in the  seated position. The skin over the trigger point was prepared with alcohol. Using aseptic no touch technique, a 22 gauge, 1 1/2\" needle with 2 cc of Xylocaine 1% was directed into the trigger point. After negative aspiration, the medication was injected in a fanning out method. Adequate hemostasis was achieved and a bandage applied. The patient tolerated the procedure well and was educated in post injection care. The patient was clinically monitored after the injection and left the office without incident. There was post-injection reduction in pain and improved range of motion. Shae Baker D.O., P.T.   Board Certified Physical Medicine and Rehabilitation  Board Certified Electrodiagnostic Medicine    Administrations This Visit     lidocaine 1 % injection 2 mL     Admin Date  04/11/2022  08:28 Action  Given Dose  2 mL Route  Other Site   Administered By  Serge Freeman MA    Ordering Provider: DO Anayeli Fuentes [address]: [id number]    Lot#: [id number]    : HOSPIRA    Patient Supplied?: No

## 2022-09-21 ENCOUNTER — TELEPHONE (OUTPATIENT)
Dept: PHYSICAL THERAPY | Age: 57
End: 2022-09-21

## 2022-09-21 NOTE — TELEPHONE ENCOUNTER
Date: 2022       Patient Name: Deborah Adams  : 1965      MRN: [id number]    Patient canceled appointment scheduled for today  at 1500 due to no reason given to therapist .    Brenda Howell, PT

## 2022-10-14 ENCOUNTER — OFFICE VISIT (OUTPATIENT)
Dept: PHYSICAL MEDICINE AND REHAB | Age: 57
End: 2022-10-14
Payer: COMMERCIAL

## 2022-10-14 VITALS
BODY MASS INDEX: 20.13 KG/M2 | HEART RATE: 103 BPM | SYSTOLIC BLOOD PRESSURE: 142 MMHG | DIASTOLIC BLOOD PRESSURE: 88 MMHG | WEIGHT: 109.4 LBS | HEIGHT: 62 IN

## 2022-10-14 DIAGNOSIS — G43.709 CHRONIC MIGRAINE WITHOUT AURA WITHOUT STATUS MIGRAINOSUS, NOT INTRACTABLE: ICD-10-CM

## 2022-10-14 DIAGNOSIS — M79.10 TRIGGER POINT: Primary | ICD-10-CM

## 2022-10-14 PROCEDURE — 20553 NJX 1/MLT TRIGGER POINTS 3/>: CPT | Performed by: PHYSICAL MEDICINE & REHABILITATION

## 2022-10-14 PROCEDURE — 99214 OFFICE O/P EST MOD 30 MIN: CPT | Performed by: PHYSICAL MEDICINE & REHABILITATION

## 2022-10-14 RX ORDER — LIDOCAINE HYDROCHLORIDE 10 MG/ML
4 INJECTION, SOLUTION INFILTRATION; PERINEURAL ONCE
Status: COMPLETED | OUTPATIENT
Start: 2022-10-14 | End: 2022-10-17

## 2022-10-14 RX ORDER — PROMETHAZINE HYDROCHLORIDE 25 MG/1
25 TABLET ORAL EVERY 6 HOURS PRN
Qty: 40 TABLET | Refills: 2 | Status: SHIPPED | OUTPATIENT
Start: 2022-10-14 | End: 2022-11-13

## 2022-10-14 RX ORDER — BUTALBITAL, ACETAMINOPHEN AND CAFFEINE 300; 40; 50 MG/1; MG/1; MG/1
1 CAPSULE ORAL 2 TIMES DAILY PRN
Qty: 60 CAPSULE | Refills: 2 | Status: SHIPPED | OUTPATIENT
Start: 2022-10-14

## 2022-10-14 RX ORDER — CYCLOBENZAPRINE HCL 10 MG
10 TABLET ORAL 2 TIMES DAILY PRN
Qty: 180 TABLET | Refills: 3 | Status: SHIPPED | OUTPATIENT
Start: 2022-10-14

## 2022-10-14 NOTE — PROGRESS NOTES
Chelsey Sheffield D.O. [facility]   [facility] [address] [facility] [place]  Phone: [phone]  Fax: [phone]       10/17/2022    Chief Complaint   Patient presents with    Migraine     6 week F/U migraine       Ulysses Dao is a 55 y.o.  right hand dominant woman seen today in follow up of headache. Interval history: Since the last visit the patient reports her headache frequency has decreased down to 5 per month in the most recent 30 days. She is doing acupunture weekly and PT weekly. She is off Neurontin and the paresthesias have not recurred in her left arm. Now, the pain is constant. There were 20 headache days in the last month. The patient used 20 rescue medications per month. The pain is rated Pain Score:   4, is described as shooting, and is located in the neck with radiation to the top of the head. The symptoms have been worse since onset. The pain is better with ice. The pain is worse with lights, sounds. There is not aura. HIT 6 is 46    As a part of today's visit, I have reviewed the following medical records: prior office notes, Botox injection procedure note. An independent historian was not needed to obtain history.      Past Medical History:   Diagnosis Date    Endometriosis     Headache     Hypertension     Osteoarthritis     Peptic ulcer        Past Surgical History:   Procedure Laterality Date    ABDOMEN SURGERY      X2 Laproscopic for endometriosis    BREAST SURGERY      HYSTERECTOMY, VAGINAL      KNEE SURGERY Bilateral     X5 on each    NECK SURGERY      RHINOPLASTY      TONSILLECTOMY      WRIST GANGLION EXCISION      LEFT       Social History     Tobacco Use    Smoking status: Former     Packs/day: 0.25     Types: Cigarettes     Quit date: 2020     Years since quittin.1    Smokeless tobacco: Never   Vaping Use    Vaping Use: Never used   Substance Use Topics    Alcohol use: Yes     Comment: socially    Drug use: Never Family History   Problem Relation Age of Onset    Osteoporosis Mother     Hypertension Father     Cancer Father        Current Outpatient Medications   Medication Sig Dispense Refill    butalbital-APAP-caffeine (FIORICET) -40 MG CAPS per capsule Take 1 capsule by mouth 2 times daily as needed for Headaches 60 capsule 2    promethazine (PHENERGAN) 25 MG tablet Take 1 tablet by mouth every 6 hours as needed for Nausea 40 tablet 2    cyclobenzaprine (FLEXERIL) 10 MG tablet Take 1 tablet by mouth 2 times daily as needed for Muscle spasms (may cause drowsiness) 180 tablet 3    ipratropium (ATROVENT) 0.06 % nasal spray       butalbital-APAP-caffeine (FIORICET) -40 MG CAPS per capsule Take 1 capsule by mouth 2 times daily as needed for Headaches 60 capsule 2    topiramate (TOPAMAX) 50 mg tablet Take 1 tablet by mouth 2 times daily 180 tablet 3    ZOLMitriptan (ZOMIG) 5 MG tablet TAKE 1 TABLET BY MOUTH AS NEEDED FOR MIGRAINE 6 tablet 5    loratadine (CLARITIN) 10 MG capsule Take 10 mg by mouth daily      mupirocin (BACTROBAN) 2 % ointment       pantoprazole (PROTONIX) 40 MG tablet 2 times daily      meloxicam (MOBIC) 15 MG tablet       triamcinolone (KENALOG) 0.1 % cream       acetaminophen (TYLENOL) 500 MG tablet Acetaminophen TYLENOL EXTRA STRENGTH 500 MG TABS take as directed on bottle as needed 2020/08/05 ACETAMINOPHEN [phone] Jeanne Fuentes MD 08-  Lisa ([id number])      Lasmiditan Succinate (REYVOW) 50 MG TABS Take 50 mg by mouth daily as needed (headache) 10 tablet 2    Onabotulinumtoxin A (BOTOX, COSMETIC,) 200 units injection Inject 155 Units into the muscle every 30 days      hydrochlorothiazide (MICROZIDE) 12.5 MG capsule Take 12.5 mg by mouth daily. gabapentin (NEURONTIN) 300 MG capsule Take 1 capsule by mouth 3 times daily for 30 days.  90 capsule 2    almotriptan (AXERT) 12.5 MG tablet Take 1 tablet by mouth once as needed for Migraine may repeat in 2 hours if needed 10 tablet 2     Current Facility-Administered Medications   Medication Dose Route Frequency Provider Last Rate Last Admin    lidocaine 1 % injection 4 mL  4 mL Other Once Rj Chavira, DO           Allergies   Allergen Reactions    Aimovig [Erenumab-Aooe] Swelling    Avelox [Moxifloxacin]     Charcoal Activated [Activated Charcoal] Hives    Doxycycline Swelling     Lips    Sulfa Antibiotics     Augmentin [Amoxicillin-Pot Clavulanate] Rash     ROS: No new weakness, paresthesia, incontinence of bowel or bladder, falls or gait dysfunction. Physical Exam: Blood pressure (!) 142/88, pulse (!) 103, height 5' 2\" (1.575 m), weight 109 lb 6.4 oz (49.6 kg), last menstrual period 04/10/2018. General: The patient is in no apparent distress. Body habitus is non-obese. HEENT: No rhinorrhea, sneezing, yawning, or lacrimation. No scleral icterus or conjunctival injection. SKIN: No piloerection. No track marks. No rash. Normal turgor. No erythema or ecchymosis. Psychological: Mood and affect are appropriate. Hygiene is appropriate. Cardiovascular:  Heart is regular rate and rhythm. Peripheral pulses are 2+ at the dorsalis pedis, posterior tibial and radial arteries. There is no edema. Respiratory: Respirations are regular and unlabored. There is no cyanosis. Lymphatic: There is no cervical or inguinal lymphadenopathy. Gastrointestinal: Soft abdomen, non-tender. No pulsating abdominal mass. Genitourinary: No costovertebral angle tenderness. MSK: Trigger point present right trapezius, left trapezius right rhomboid X3 Reproduction of headache with palpation of bilateral occipital notch. Spurling negative. Neurologic: Awake, alert and oriented in three planes. Speech is fluent. No facial weakness. Tongue is midline. Hearing is intact for conversation. Pupils are equal and round. Extraocular muscles are intact. Hearing is intact for conversation. Shoulder shrug symmetric.  Sensation: Intact for light touch and pin prick in all upper and lower extremity dermatomes. Vibration and proprioception are intact at the bilateral first MTP. Strength: 5/5 in all myotomes in the upper and lower extremities. Muscle Tendon Reflexes: 2+ symmetric in the bilateral upper and lower extremities. Babinski is downgoing bilaterally. Rick Raker is negative bilaterally. Gait is Normal.   Romberg is negative. Heel and toe walk are normal.  Tandem walk is normal.  No clonus or spasticity. The patient was able to rise from a chair and squat without difficulty. There is no tremor. Impression:   1. Trigger point    2. Chronic migraine without aura without status migrainosus, not intractable        Plan: This has included the decision for minor procedure: Trigger point injection . Procedure risk factors were discussed. Prescription drug management has included:     Orders Placed This Encounter   Medications    butalbital-APAP-caffeine (FIORICET) -40 MG CAPS per capsule     Sig: Take 1 capsule by mouth 2 times daily as needed for Headaches     Dispense:  60 capsule     Refill:  2    promethazine (PHENERGAN) 25 MG tablet     Sig: Take 1 tablet by mouth every 6 hours as needed for Nausea     Dispense:  40 tablet     Refill:  2    cyclobenzaprine (FLEXERIL) 10 MG tablet     Sig: Take 1 tablet by mouth 2 times daily as needed for Muscle spasms (may cause drowsiness)     Dispense:  180 tablet     Refill:  3    lidocaine 1 % injection 4 mL          Medications Discontinued During This Encounter   Medication Reason    cyclobenzaprine (FLEXERIL) 10 MG tablet REORDER    butalbital-APAP-caffeine (FIORICET) -40 MG CAPS per capsule REORDER    promethazine (PHENERGAN) 25 MG tablet REORDER         Medications prescribed do not require monitoring for toxicity. Diagnosis and treatment were significantly impacted by social determinants of health including significant psychosocial stressors.       Continue acupuncture  The patient was educated about the diagnosis, prognosis, indications, risks and benefits of treatment. An opportunity to ask questions was given to the patient and questions were answered. The patient agreed to proceed with the recommended treatment as described above. Return in about 5 weeks (around 11/18/2022). Thank you for allowing me to participate in the care of your patient. Pleas Epley, D.O., P.T. Board Certified Physical Medicine and Rehabilitation  Board Certified [address] [facility] [address]pe Garcia, [address] [place] [address]. [facility]  [facility] [facility] [address] [place] [address]  Phone: [phone]  Fax: [phone]    10/17/2022    Chief Complaint   Patient presents with    Migraine     6 week F/U migraine       Last injection: 5/25/22  Taking anticoagulants/antiplatelets: No  Diabetic: No  Febrile/active infection: No    Ambulatory Procedure Time Out  Correct Patient: Yes  Correct Procedure: Yes  Correct Site/Side: Yes  Correct Site(s) Marked: Yes  Informed Consent Signed: Yes  Allergies Verified: Yes  Staff Present & Credential[de-identified] Herbie Garrido LPN, Dr. Pepe Garcia DO    After explaining the indications, risks, benefits and alternatives of a Right trapezius, left trapezius, right rhomboid trigger points X3, the patient agreed to proceed. A permit was signed and scanned into the chart. The patient was placed in the  seated position. The skin over the trigger point was prepared with alcohol. Using aseptic no touch technique, a 22 gauge, 1 1/2\" needle with 6 cc of Xylocaine 1% was directed into the trigger point. After negative aspiration, 2 cc of the medication was injected in a fanning out method at each of 3 trigger points  Adequate hemostasis was achieved and a bandage applied. The patient tolerated the procedure well and was educated in post injection care.   The patient was clinically monitored after the injection and left the office without incident. There was post-injection reduction in pain and improved range of motion. Isaias Herndon D.O., P.T.   Board Certified Physical Medicine and Rehabilitation  Board Certified Electrodiagnostic Medicine

## 2022-10-17 RX ADMIN — LIDOCAINE HYDROCHLORIDE 4 ML: 10 INJECTION, SOLUTION INFILTRATION; PERINEURAL at 10:28

## 2022-10-27 ENCOUNTER — TELEPHONE (OUTPATIENT)
Dept: ADMINISTRATIVE | Age: 57
End: 2022-10-27

## 2022-10-27 NOTE — TELEPHONE ENCOUNTER
Pt would like to know if she can get her Trigger point injections sooner. She has an appt on 11/21/ at 3:30 pm. For the last 8 days she has had Migraines. She spoke to Dr. Altaf Rangel and she advised her to call and get scheduled.

## 2022-10-27 NOTE — TELEPHONE ENCOUNTER
Called and spoke with the patient and scheduled her for trigger point injections. Patient is scheduled on 10-28-22.

## 2022-10-28 ENCOUNTER — OFFICE VISIT (OUTPATIENT)
Dept: PHYSICAL MEDICINE AND REHAB | Age: 57
End: 2022-10-28
Payer: COMMERCIAL

## 2022-10-28 VITALS
DIASTOLIC BLOOD PRESSURE: 89 MMHG | WEIGHT: 111 LBS | BODY MASS INDEX: 20.43 KG/M2 | TEMPERATURE: 97.2 F | HEART RATE: 101 BPM | SYSTOLIC BLOOD PRESSURE: 127 MMHG | HEIGHT: 62 IN

## 2022-10-28 DIAGNOSIS — M54.2 TRIGGER POINT OF NECK: Primary | ICD-10-CM

## 2022-10-28 DIAGNOSIS — M54.81 BILATERAL OCCIPITAL NEURALGIA: ICD-10-CM

## 2022-10-28 PROCEDURE — 64405 NJX AA&/STRD GR OCPL NRV: CPT | Performed by: PHYSICAL MEDICINE & REHABILITATION

## 2022-10-28 PROCEDURE — 20552 NJX 1/MLT TRIGGER POINT 1/2: CPT | Performed by: PHYSICAL MEDICINE & REHABILITATION

## 2022-10-28 PROCEDURE — 96372 THER/PROPH/DIAG INJ SC/IM: CPT | Performed by: PHYSICAL MEDICINE & REHABILITATION

## 2022-10-28 RX ORDER — BUPIVACAINE HYDROCHLORIDE 2.5 MG/ML
30 INJECTION, SOLUTION INFILTRATION; PERINEURAL ONCE
Status: COMPLETED | OUTPATIENT
Start: 2022-10-28 | End: 2022-10-28

## 2022-10-28 RX ORDER — LIDOCAINE HYDROCHLORIDE 10 MG/ML
20 INJECTION, SOLUTION INFILTRATION; PERINEURAL ONCE
Status: COMPLETED | OUTPATIENT
Start: 2022-10-28 | End: 2022-10-28

## 2022-10-28 RX ORDER — KETOROLAC TROMETHAMINE 15 MG/ML
15 INJECTION, SOLUTION INTRAMUSCULAR; INTRAVENOUS ONCE
Status: COMPLETED | OUTPATIENT
Start: 2022-10-28 | End: 2022-10-28

## 2022-10-28 RX ADMIN — LIDOCAINE HYDROCHLORIDE 20 ML: 10 INJECTION, SOLUTION INFILTRATION; PERINEURAL at 10:41

## 2022-10-28 RX ADMIN — BUPIVACAINE HYDROCHLORIDE 75 MG: 2.5 INJECTION, SOLUTION INFILTRATION; PERINEURAL at 10:38

## 2022-10-28 RX ADMIN — KETOROLAC TROMETHAMINE 15 MG: 15 INJECTION, SOLUTION INTRAMUSCULAR; INTRAVENOUS at 10:39

## 2022-10-28 NOTE — PROGRESS NOTES
Jennifer Ludwig D.O. [facility]  [address] [facility] [place]  Phone: [phone]  Fax: [phone]    10/28/2022    Chief Complaint   Patient presents with    Migraine     Trigger point injections        Last injection: 10/14/22  Taking anticoagulants/antiplatelets: No  Diabetic: No  Febrile/active infection: No    Ambulatory Procedure Time Out  Correct Patient: Yes  Correct Procedure: Yes  Correct Site/Side: Yes  Correct Site(s) Marked: Yes  Informed Consent Signed: Yes  Allergies Verified: Yes  Staff Present & Credential[de-identified] Chuy Haji, [address] [place] [address]    After explaining the indications, risks, benefits and alternatives of a Bilateral trapezius trigger point, the patient agreed to proceed. A permit was signed and scanned into the chart. The patient was placed in the  seated position. The skin over the trigger point was prepared with alcohol. Using aseptic no touch technique, a 22 gauge, 1 1/2\" needle with 2 cc of Xylocaine 1% was directed into the trigger point. After negative aspiration, the medication was injected in a fanning out method. Adequate hemostasis was achieved and a bandage applied. The patient tolerated the procedure well and was educated in post injection care. The patient was clinically monitored after the injection and left the office without incident. There was post-injection reduction in pain and improved range of motion. Jennifer Ludwig D.O., P.T.   Board Certified Physical Medicine and Rehabilitation  Board Certified Electrodiagnostic Medicine    Administrations This Visit       bupivacaine (MARCAINE) 0.25 % injection 75 mg       Admin Date  10/28/2022  10:38 Action  Given Dose  75 mg Route  IntraDERmal Site  Other Administered By  Bekah Angel RN    Ordering Provider: Hilary Hansen DO    NDC: 1530-9092-27    Lot#: [id number]    : HOSPIRA    Patient Supplied?: No              ketorolac (TORADOL) injection 15 mg       Admin Date  10/28/2022  10:39 Action  Given Dose  15 mg Route  IntraMUSCular Site  Dorsogluteal Right Administered By  Chris Clark, RN    Ordering Provider: Jose Alvarado DO    NDC: 3867-0213-58    Lot#: [id number]    : Carlos Coldgerhard    Patient Supplied?: No              lidocaine 1 % injection 20 mL       Admin Date  10/28/2022  10:41 Action  Given Dose  20 mL Route  Other Site   Administered By  Chris Clark, RN    Ordering Provider: Jose Alvarado DO    Anayeli Kaminski 47: [id number]    Lot#: [id number]    : [address] [facility]    Patient Supplied?: No                    Deanne Ness D.O. [facility]  [address] [facility]. [address] [place] [address]  Phone: [phone]  Fax: [phone]    10/28/2022    Chief Complaint   Patient presents with    Migraine     Trigger point injections        Last injection: 6/13/22  Taking anticoagulants/antiplatelets: No  Diabetic: No  Febrile/active infection: No    Ambulatory Procedure Time Out  Correct Patient: Yes  Correct Procedure: Yes  Correct Site/Side: Yes  Correct Site(s) Marked: Yes  Informed Consent Signed: Yes  Allergies Verified: Yes  Staff Present & Credential[de-identified] Carol Kelsey, [address] [place] [address]    After explaining the indications, risks, benefits and alternatives of a Bilateral occipital nerve block, the patient agreed to proceed. A permit was signed and scanned into the media. The patient was placed in the seated position. The skin was prepared with alcohol. Using an aseptic, no touch technique, a 25 gauge, 5/8\" needle with 2 cc of Bupivicaine 0.25% was directed to the occipital notch at the point of maximal tenderness. After negative aspiration, the medication was injected was injected. Adequate hemostasis was obtained. The patient tolerated the procedure well and was educated in post injection care. The patient was monitored clinically and left the office without incident. Pain was reduced post-injection. Merlin Nam D.O., P.T.   Board Certified Physical Medicine and Rehabilitation  Board Certified Electrodiagnostic Medicine    Administrations This Visit       bupivacaine (MARCAINE) 0.25 % injection 75 mg       Admin Date  10/28/2022  10:38 Action  Given Dose  75 mg Route  IntraDERmal Site  Other Administered By  Kaylen Boyer RN    Ordering Provider: Kathia Hi DO    NDC: 5248-9071-54    Lot#: [id number]    : HOSPIRA    Patient Supplied?: No              ketorolac (TORADOL) injection 15 mg       Admin Date  10/28/2022  10:39 Action  Given Dose  15 mg Route  IntraMUSCular Site  Dorsogluteal Right Administered By  Kaylen Boyer RN    Ordering Provider: Kathia Hi DO    NDC: 9759-0267-36    Lot#: [id number]    : HOSPIRA    Patient Supplied?: No              lidocaine 1 % injection 20 mL       Admin Date  10/28/2022  10:41 Action  Given Dose  20 mL Route  Other Site   Administered By  Kaylen Boyer RN    Ordering Provider: DO Anayeli Watts 47: [id number]    Lot#: [id number]    : [address] [facility]    Patient Supplied?: No

## 2022-11-21 ENCOUNTER — OFFICE VISIT (OUTPATIENT)
Dept: PHYSICAL MEDICINE AND REHAB | Age: 57
End: 2022-11-21
Payer: COMMERCIAL

## 2022-11-21 VITALS
SYSTOLIC BLOOD PRESSURE: 120 MMHG | BODY MASS INDEX: 20.43 KG/M2 | HEART RATE: 99 BPM | TEMPERATURE: 97.3 F | DIASTOLIC BLOOD PRESSURE: 85 MMHG | HEIGHT: 62 IN | WEIGHT: 111 LBS

## 2022-11-21 DIAGNOSIS — G43.709 CHRONIC MIGRAINE WITHOUT AURA WITHOUT STATUS MIGRAINOSUS, NOT INTRACTABLE: ICD-10-CM

## 2022-11-21 DIAGNOSIS — M54.2 TRIGGER POINT OF NECK: Primary | ICD-10-CM

## 2022-11-21 DIAGNOSIS — M54.81 BILATERAL OCCIPITAL NEURALGIA: ICD-10-CM

## 2022-11-21 PROCEDURE — 20552 NJX 1/MLT TRIGGER POINT 1/2: CPT | Performed by: PHYSICAL MEDICINE & REHABILITATION

## 2022-11-21 PROCEDURE — 99214 OFFICE O/P EST MOD 30 MIN: CPT | Performed by: PHYSICAL MEDICINE & REHABILITATION

## 2022-11-21 RX ORDER — HYDROXYZINE PAMOATE 25 MG/1
CAPSULE ORAL
COMMUNITY
Start: 2022-10-24

## 2022-11-21 RX ORDER — PROMETHAZINE HYDROCHLORIDE 25 MG/1
25 TABLET ORAL EVERY 6 HOURS PRN
Qty: 40 TABLET | Refills: 2 | Status: SHIPPED | OUTPATIENT
Start: 2022-11-21 | End: 2022-12-21

## 2022-11-21 RX ORDER — LIDOCAINE HYDROCHLORIDE 10 MG/ML
4 INJECTION, SOLUTION INFILTRATION; PERINEURAL ONCE
Status: COMPLETED | OUTPATIENT
Start: 2022-11-21 | End: 2022-11-21

## 2022-11-21 RX ORDER — BUTALBITAL, ACETAMINOPHEN AND CAFFEINE 300; 40; 50 MG/1; MG/1; MG/1
1 CAPSULE ORAL 2 TIMES DAILY PRN
Qty: 60 CAPSULE | Refills: 2 | Status: SHIPPED | OUTPATIENT
Start: 2022-11-21

## 2022-11-21 RX ADMIN — LIDOCAINE HYDROCHLORIDE 4 ML: 10 INJECTION, SOLUTION INFILTRATION; PERINEURAL at 16:21

## 2022-11-21 NOTE — PROGRESS NOTES
Tyrese Rogers D.O. [facility]   [facility] [address] [facility] [place]  Phone: [phone]  Fax: [phone]       2022    Chief Complaint   Patient presents with    Migraine     Trigger point injections       Maximo Graham is a 65 y.o.  right hand dominant woman seen today in follow up of headache. Interval history: Since the last visit the patient reports her headache frequency has decreased down to 3 per month in the most recent 30 days. She is doing acupunture weekly. She is off Neurontin and the paresthesias have not recurred in her left arm. Now, the pain is constant. The patient used 3 rescue medications per month. The pain is rated Pain Score:   2, is described as shooting, and is located in the neck with radiation to the top of the head. The symptoms have been worse since onset. The pain is better with ice. The pain is worse with lights, sounds. There is not aura. HIT 6 is 46. As a part of today's visit, I have reviewed the following medical records: prior office notes, Botox injection procedure note. An independent historian was not needed to obtain history.      Past Medical History:   Diagnosis Date    Endometriosis     Headache     Hypertension     Osteoarthritis     Peptic ulcer        Past Surgical History:   Procedure Laterality Date    ABDOMEN SURGERY      X2 Laproscopic for endometriosis    BREAST SURGERY      HYSTERECTOMY, VAGINAL      KNEE SURGERY Bilateral     X5 on each    NECK SURGERY      RHINOPLASTY      TONSILLECTOMY      WRIST GANGLION EXCISION      LEFT       Social History     Tobacco Use    Smoking status: Former     Packs/day: 0.25     Types: Cigarettes     Quit date: 2020     Years since quittin.2    Smokeless tobacco: Never   Vaping Use    Vaping Use: Never used   Substance Use Topics    Alcohol use: Yes     Comment: socially    Drug use: Never       Family History   Problem Relation Age of Onset Osteoporosis Mother     Hypertension Father     Cancer Father        Current Outpatient Medications   Medication Sig Dispense Refill    butalbital-APAP-caffeine (FIORICET) -40 MG CAPS per capsule Take 1 capsule by mouth 2 times daily as needed for Headaches 60 capsule 2    promethazine (PHENERGAN) 25 MG tablet Take 1 tablet by mouth every 6 hours as needed for Nausea 40 tablet 2    hydrOXYzine pamoate (VISTARIL) 25 MG capsule TAKE 1 CAPSULE BY MOUTH 3 TIMES A DAY AS NEEDED FOR ANXIETY. CAN ALSO BE USED FOR ITCHINESS      cyclobenzaprine (FLEXERIL) 10 MG tablet Take 1 tablet by mouth 2 times daily as needed for Muscle spasms (may cause drowsiness) 180 tablet 3    ipratropium (ATROVENT) 0.06 % nasal spray       butalbital-APAP-caffeine (FIORICET) -40 MG CAPS per capsule Take 1 capsule by mouth 2 times daily as needed for Headaches 60 capsule 2    topiramate (TOPAMAX) 50 mg tablet Take 1 tablet by mouth 2 times daily 180 tablet 3    ZOLMitriptan (ZOMIG) 5 MG tablet TAKE 1 TABLET BY MOUTH AS NEEDED FOR MIGRAINE 6 tablet 5    loratadine (CLARITIN) 10 MG capsule Take 10 mg by mouth daily      mupirocin (BACTROBAN) 2 % ointment       pantoprazole (PROTONIX) 40 MG tablet 2 times daily      triamcinolone (KENALOG) 0.1 % cream       acetaminophen (TYLENOL) 500 MG tablet Acetaminophen TYLENOL EXTRA STRENGTH 500 MG TABS take as directed on bottle as needed 2020/08/05 ACETAMINOPHEN [id number] Laxmi Cheung MD 08-  Lisa ([id number])      Lasmiditan Succinate (REYVOW) 50 MG TABS Take 50 mg by mouth daily as needed (headache) 10 tablet 2    Onabotulinumtoxin A (BOTOX, COSMETIC,) 200 units injection Inject 155 Units into the muscle every 30 days      hydrochlorothiazide (MICROZIDE) 12.5 MG capsule Take 12.5 mg by mouth daily. gabapentin (NEURONTIN) 300 MG capsule Take 1 capsule by mouth 3 times daily for 30 days.  90 capsule 2    almotriptan (AXERT) 12.5 MG tablet Take 1 tablet by mouth once as needed for Migraine may repeat in 2 hours if needed 10 tablet 2    meloxicam (MOBIC) 15 MG tablet  (Patient not taking: Reported on 11/21/2022)       No current facility-administered medications for this visit. Allergies   Allergen Reactions    Aimovig [Erenumab-Aooe] Swelling    Avelox [Moxifloxacin]     Charcoal Activated [Activated Charcoal] Hives    Doxycycline Swelling     Lips    Sulfa Antibiotics     Augmentin [Amoxicillin-Pot Clavulanate] Rash     ROS: No new weakness, paresthesia, incontinence of bowel or bladder, falls or gait dysfunction. Physical Exam: Blood pressure 120/85, pulse 99, temperature 97.3 °F (36.3 °C), temperature source Temporal, height 5' 2\" (1.575 m), weight 111 lb (50.3 kg), last menstrual period 04/10/2018. General: The patient is in no apparent distress. Body habitus is non-obese. MSK: Trigger point present right trapezius, right rhomboid X2 Reproduction of headache with palpation of bilateral occipital notch. Spurling negative. Neurologic:  No focal sensorimotor deficit. Reflexes 2+ and symmetric. Gait is normal.    Impression:   1. Trigger point of neck    2. Chronic migraine without aura without status migrainosus, not intractable    3. Bilateral occipital neuralgia        Plan: This has included the decision for minor procedure: Trigger point injection . Procedure risk factors were discussed.      Prescription drug management has included:     Orders Placed This Encounter   Medications    butalbital-APAP-caffeine (FIORICET) -40 MG CAPS per capsule     Sig: Take 1 capsule by mouth 2 times daily as needed for Headaches     Dispense:  60 capsule     Refill:  2    promethazine (PHENERGAN) 25 MG tablet     Sig: Take 1 tablet by mouth every 6 hours as needed for Nausea     Dispense:  40 tablet     Refill:  2    lidocaine 1 % injection 4 mL            Medications Discontinued During This Encounter   Medication Reason butalbital-APAP-caffeine (FIORICET) -40 MG CAPS per capsule DUPLICATE    promethazine (PHENERGAN) 25 MG tablet REORDER         Medications prescribed do not require monitoring for toxicity. Diagnosis and treatment were significantly impacted by social determinants of health including significant psychosocial stressors. Continue acupuncture  The patient was educated about the diagnosis, prognosis, indications, risks and benefits of treatment. An opportunity to ask questions was given to the patient and questions were answered. The patient agreed to proceed with the recommended treatment as described above. Return in about 6 weeks (around 1/2/2023). Thank you for allowing me to participate in the care of your patient. Kira Duggan D.O., P.T. Board Certified Physical Medicine and Rehabilitation  Board Certified [address] [place] [address]. [facility]  [facility]. [address] [place] [address]  Phone: [phone]  Fax: [phone]    11/21/2022    Chief Complaint   Patient presents with    Migraine     Trigger point injections       Last injection: 5/25/22  Taking anticoagulants/antiplatelets: No  Diabetic: No  Febrile/active infection: No    Ambulatory Procedure Time Out  Correct Patient: Yes  Correct Procedure: Yes  Correct Site/Side: Yes  Correct Site(s) Marked: Yes  Informed Consent Signed: Yes  Allergies Verified: Yes  Staff Present & Credential[de-identified] Maricel Simms LPN, Dr. Berrios DO    After explaining the indications, risks, benefits and alternatives of a Right trapezius, right rhomboid trigger points X2, the patient agreed to proceed. A permit was signed and scanned into the chart. The patient was placed in the  seated position. The skin over the trigger point was prepared with alcohol. Using aseptic no touch technique, a 22 gauge, 1 1/2\" needle with 6 cc of Xylocaine 1% was directed into the trigger point.   After negative aspiration, 2 cc of the medication was injected in a fanning out method at each of 2 trigger points  Adequate hemostasis was achieved and a bandage applied. The patient tolerated the procedure well and was educated in post injection care. The patient was clinically monitored after the injection and left the office without incident. There was post-injection reduction in pain and improved range of motion. Hua Manzo D.O., P.T.   Board Certified Physical Medicine and Rehabilitation  Board Certified Electrodiagnostic Medicine    Administrations This Visit       lidocaine 1 % injection 4 mL       Admin Date  11/21/2022  16:21 Action  Given Dose  4 mL Route  Other Site   Administered By  Elsa Jones MA    Ordering Provider: DO Anayeli Burgess [address]: [id number]    Lot#: [id number]    [id number]: [address]    Patient Supplied?: No

## 2022-12-02 ENCOUNTER — OFFICE VISIT (OUTPATIENT)
Dept: PHYSICAL MEDICINE AND REHAB | Age: 57
End: 2022-12-02

## 2022-12-02 VITALS
HEIGHT: 62 IN | DIASTOLIC BLOOD PRESSURE: 81 MMHG | HEART RATE: 81 BPM | BODY MASS INDEX: 20.94 KG/M2 | SYSTOLIC BLOOD PRESSURE: 117 MMHG | WEIGHT: 113.8 LBS

## 2022-12-02 DIAGNOSIS — M54.2 TRIGGER POINT OF NECK: Primary | ICD-10-CM

## 2022-12-02 RX ORDER — LIDOCAINE HYDROCHLORIDE 10 MG/ML
4 INJECTION, SOLUTION INFILTRATION; PERINEURAL ONCE
Status: COMPLETED | OUTPATIENT
Start: 2022-12-02 | End: 2022-12-02

## 2022-12-02 RX ADMIN — LIDOCAINE HYDROCHLORIDE 4 ML: 10 INJECTION, SOLUTION INFILTRATION; PERINEURAL at 15:40

## 2022-12-02 NOTE — PROGRESS NOTES
Jaciel Jara D.O. [facility]  [address] [facility] [place]  Phone: [phone]  Fax: [phone]    12/2/2022    Chief Complaint   Patient presents with    Neck Pain    Shoulder Pain     Trigger point injections       Last injection: 10/28/22  Taking anticoagulants/antiplatelets: No  Diabetic: No  Febrile/active infection: No    Ambulatory Procedure Time Out  Correct Patient: Yes  Correct Procedure: Yes  Correct Site/Side: Yes  Correct Site(s) Marked: Yes  Informed Consent Signed: Yes  Allergies Verified: Yes  Staff Present & Credential[de-identified] Mau Ansarielor. N./    After explaining the indications, risks, benefits and alternatives of a Right trapezius trigger point, the patient agreed to proceed. A permit was signed and scanned into the chart. The patient was placed in the  seated position. The skin over the trigger point was prepared with alcohol. Using aseptic no touch technique, a 22 gauge, 1 1/2\" needle with 2 cc of Xylocaine 1% was directed into the trigger point. After negative aspiration, the medication was injected in a fanning out method. Adequate hemostasis was achieved and a bandage applied. The patient tolerated the procedure well and was educated in post injection care. The patient was clinically monitored after the injection and left the office without incident. There was post-injection reduction in pain and improved range of motion. Jaciel Jara D.O., P.T.   Board Certified Physical Medicine and Rehabilitation  Board Certified Electrodiagnostic Medicine    Administrations This Visit       lidocaine 1 % injection 4 mL       Admin Date  12/02/2022  15:40 Action  Given Dose  4 mL Route  Other Site   Administered By  Levon Grider RN    Ordering Provider: Luis Velazquez DO    NDC: 6003-0367-33    Lot#: [id number]    : [address]    Patient Supplied?: No

## 2022-12-19 RX ORDER — PROMETHAZINE HYDROCHLORIDE 25 MG/1
25 TABLET ORAL EVERY 6 HOURS PRN
Qty: 40 TABLET | Refills: 2 | Status: CANCELLED | OUTPATIENT
Start: 2022-12-19 | End: 2023-01-18

## 2022-12-19 RX ORDER — ZOLMITRIPTAN 5 MG/1
TABLET, FILM COATED ORAL
Qty: 6 TABLET | Refills: 5 | Status: SHIPPED | OUTPATIENT
Start: 2022-12-19

## 2022-12-19 NOTE — TELEPHONE ENCOUNTER
Patient last visit 12-2-22 next visit 1-10-22. Patient sent a refill request for zomig 5 mg 1 tab prn sent 5-25-22 #6 5 refills. Please advise.

## 2022-12-23 ENCOUNTER — OFFICE VISIT (OUTPATIENT)
Dept: PHYSICAL MEDICINE AND REHAB | Age: 57
End: 2022-12-23

## 2022-12-23 VITALS
WEIGHT: 113 LBS | SYSTOLIC BLOOD PRESSURE: 139 MMHG | HEIGHT: 62 IN | BODY MASS INDEX: 20.8 KG/M2 | TEMPERATURE: 96.9 F | HEART RATE: 108 BPM | DIASTOLIC BLOOD PRESSURE: 89 MMHG

## 2022-12-23 DIAGNOSIS — G43.901 STATUS MIGRAINOSUS: ICD-10-CM

## 2022-12-23 DIAGNOSIS — M54.2 TRIGGER POINT OF NECK: Primary | ICD-10-CM

## 2022-12-23 DIAGNOSIS — M54.81 BILATERAL OCCIPITAL NEURALGIA: ICD-10-CM

## 2022-12-23 RX ORDER — KETOROLAC TROMETHAMINE 15 MG/ML
15 INJECTION, SOLUTION INTRAMUSCULAR; INTRAVENOUS ONCE
Status: COMPLETED | OUTPATIENT
Start: 2022-12-23 | End: 2022-12-23

## 2022-12-23 RX ORDER — PROMETHAZINE HYDROCHLORIDE 25 MG/1
25 TABLET ORAL 4 TIMES DAILY PRN
Qty: 60 TABLET | Refills: 11 | Status: SHIPPED | OUTPATIENT
Start: 2022-12-23 | End: 2023-01-22

## 2022-12-23 RX ORDER — BUPIVACAINE HYDROCHLORIDE 2.5 MG/ML
4 INJECTION, SOLUTION INFILTRATION; PERINEURAL ONCE
Status: COMPLETED | OUTPATIENT
Start: 2022-12-23 | End: 2022-12-23

## 2022-12-23 RX ADMIN — BUPIVACAINE HYDROCHLORIDE 10 MG: 2.5 INJECTION, SOLUTION INFILTRATION; PERINEURAL at 12:52

## 2022-12-23 RX ADMIN — KETOROLAC TROMETHAMINE 15 MG: 15 INJECTION, SOLUTION INTRAMUSCULAR; INTRAVENOUS at 12:53

## 2022-12-23 NOTE — PROGRESS NOTES
Nae Chaidez D.O. [facility]  [address] [facility] [place]  Phone: [phone]  Fax: [phone]       12/23/2022    Chief Complaint   Patient presents with    Migraine     Follow up and possible injections         Monica Milton is a 65 y.o.  right hand dominant woman seen today in follow up of headache. Interval history: Since the last visit the patient reports her headache frequency has increased to about 14 headache days per month. She has had a continuous migraine for the last 5 days. Looking at the last 4 months of headache diary she has had between 9-12 headaches every month. She is doing acupunture weekly but does not feel like that is helping. She obtained her medical marijuana card from outside provider but states she was allergic to the coconut in the first one she tried and then got a headache with the second one. Now, the pain is constant. The patient used 10 rescue medications per month. The pain is rated Pain Score:   5, is described as shooting, and is located in the neck with radiation to the top of the head. The symptoms have been worse since onset. The pain is better with ice. The pain is worse with lights, sounds. There is severe associated nausea but no vomiting. There is not aura. As a part of today's visit, I have reviewed the following medical records: prior office notes, patient's headache diary. An independent historian was not needed to obtain history.      Past Medical History:   Diagnosis Date    Endometriosis     Headache     Hypertension     Osteoarthritis     Peptic ulcer        Past Surgical History:   Procedure Laterality Date    ABDOMEN SURGERY      X2 Laproscopic for endometriosis    BREAST SURGERY      HYSTERECTOMY, VAGINAL      KNEE SURGERY Bilateral     X5 on each    NECK SURGERY      RHINOPLASTY      TONSILLECTOMY      WRIST GANGLION EXCISION      LEFT       Social History     Tobacco Use Smoking status: Former     Packs/day: 0.25     Types: Cigarettes     Quit date: 2020     Years since quittin.3    Smokeless tobacco: Never   Vaping Use    Vaping Use: Never used   Substance Use Topics    Alcohol use: Yes     Comment: socially    Drug use: Never       Family History   Problem Relation Age of Onset    Osteoporosis Mother     Hypertension Father     Cancer Father        Current Outpatient Medications   Medication Sig Dispense Refill    promethazine (PHENERGAN) 25 MG tablet Take 1 tablet by mouth 4 times daily as needed for Nausea 60 tablet 11    ZOLMitriptan (ZOMIG) 5 MG tablet TAKE 1 TABLET BY MOUTH AS NEEDED FOR MIGRAINE 6 tablet 5    butalbital-APAP-caffeine (FIORICET) -40 MG CAPS per capsule Take 1 capsule by mouth 2 times daily as needed for Headaches 60 capsule 2    hydrOXYzine pamoate (VISTARIL) 25 MG capsule TAKE 1 CAPSULE BY MOUTH 3 TIMES A DAY AS NEEDED FOR ANXIETY.  CAN ALSO BE USED FOR ITCHINESS      cyclobenzaprine (FLEXERIL) 10 MG tablet Take 1 tablet by mouth 2 times daily as needed for Muscle spasms (may cause drowsiness) 180 tablet 3    ipratropium (ATROVENT) 0.06 % nasal spray       butalbital-APAP-caffeine (FIORICET) -40 MG CAPS per capsule Take 1 capsule by mouth 2 times daily as needed for Headaches 60 capsule 2    topiramate (TOPAMAX) 50 mg tablet Take 1 tablet by mouth 2 times daily 180 tablet 3    loratadine (CLARITIN) 10 MG capsule Take 10 mg by mouth daily      mupirocin (BACTROBAN) 2 % ointment       pantoprazole (PROTONIX) 40 MG tablet 2 times daily      triamcinolone (KENALOG) 0.1 % cream       acetaminophen (TYLENOL) 500 MG tablet Acetaminophen TYLENOL EXTRA STRENGTH 500 MG TABS take as directed on bottle as needed  ACETAMINOPHEN [phone] Monica Coyle MD 2020  Lisa ([id number])      Lasmiditan Succinate (REYVOW) 50 MG TABS Take 50 mg by mouth daily as needed (headache) 10 tablet 2 Onabotulinumtoxin A (BOTOX, COSMETIC,) 200 units injection Inject 155 Units into the muscle every 30 days      hydrochlorothiazide (MICROZIDE) 12.5 MG capsule Take 12.5 mg by mouth daily. almotriptan (AXERT) 12.5 MG tablet Take 1 tablet by mouth once as needed for Migraine may repeat in 2 hours if needed 10 tablet 2     No current facility-administered medications for this visit. Allergies   Allergen Reactions    Aimovig [Erenumab-Aooe] Swelling    Avelox [Moxifloxacin]     Charcoal Activated [Activated Charcoal] Hives    Doxycycline Swelling     Lips    Sulfa Antibiotics     Augmentin [Amoxicillin-Pot Clavulanate] Rash     ROS: No new weakness, paresthesia, incontinence of bowel or bladder, falls or gait dysfunction. Physical Exam: Blood pressure 139/89, pulse (!) 108, temperature 96.9 °F (36.1 °C), temperature source Temporal, height 5' 2\" (1.575 m), weight 113 lb (51.3 kg), last menstrual period 04/10/2018. General: The patient is in no apparent distress. Body habitus is non-obese. MSK: Trigger point present right trapezius. Reproduction of headache with palpation of bilateral occipital notch. Spurling negative. Neurologic:  No focal sensorimotor deficit. Reflexes 2+ and symmetric. Gait is normal.    Impression:   1. Trigger point of neck    2. Bilateral occipital neuralgia    3. Status migrainosus          Plan: This has included the decision for minor procedure: Trigger point injection, occipital nerve block bilateral .  Procedure risk factors were discussed. Prescription drug management has included:     Orders Placed This Encounter   Medications    bupivacaine (MARCAINE) 0.25 % injection 10 mg    ketorolac (TORADOL) injection 15 mg    promethazine (PHENERGAN) 25 MG tablet     Sig: Take 1 tablet by mouth 4 times daily as needed for Nausea     Dispense:  60 tablet     Refill:  11     Samples provided Ubrelvy 50 mg.   Patient will notify us if it is beneficial and if any side effects and we can call in prescription. Discussed with patient that her frequency of migraines has significantly increased since we discontinued the Botox injections last May. She does not want to resume at this time. We discussed Nervive for migraine abortive treatment. She prefers to see about the Nelson procedure. Patient has a consult scheduled with Dr. Ana Souza to see if she is a candidate for the Ana Souza procedure. Discussed referral to plastic surgery to evaluate for migraine surgery (avulsion of bilateral zygomaticotemporal branches of the trigeminal nerve/ Bilateral occipital nerve decompression with bilateral advancement flap using adipofascial tissue). Patient declined at this time. In interim, we will schedule her for migraine infusion treatment. Medications prescribed do not require monitoring for toxicity. Diagnosis and treatment were significantly impacted by social determinants of health including significant psychosocial stressors. Continue acupuncture  The patient was educated about the diagnosis, prognosis, indications, risks and benefits of treatment. An opportunity to ask questions was given to the patient and questions were answered. The patient agreed to proceed with the recommended treatment as described above. Return in about 2 weeks (around 1/6/2023). Thank you for allowing me to participate in the care of your patient. Radu Kaiser D.O., P.T. Board Certified Physical Medicine and Rehabilitation  Board Certified [facility]. [address] [facility]. [facility]  [address] [facility].  [address] [place] [address]  Phone: [phone]  Fax: [phone]    12/23/2022    Chief Complaint   Patient presents with    Migraine     Follow up and possible injections         Last injection:12/222  Taking anticoagulants/antiplatelets: No  Diabetic: No  Febrile/active infection: No    Ambulatory Procedure Time Out  Correct Patient: Yes  Correct Procedure: Yes  Correct Site/Side: Yes  Correct Site(s) Marked: Yes  Informed Consent Signed: Yes  Allergies Verified: Yes  Staff Present & Credential[de-identified] Chuy Haji, [address] [place] [address],     After explaining the indications, risks, benefits and alternatives of a Right trapezius trigger point, the patient agreed to proceed. A permit was signed and scanned into the chart. The patient was placed in the  seated position. The skin over the trigger point was prepared with alcohol. Using aseptic no touch technique, a 22 gauge, 1 1/2\" needle with 2 cc of Xylocaine 1% was directed into the trigger point. After negative aspiration, the medication was injected in a fanning out method. Adequate hemostasis was achieved and a bandage applied. The patient tolerated the procedure well and was educated in post injection care. The patient was clinically monitored after the injection and left the office without incident. There was post-injection reduction in pain and improved range of motion. Jennifer Ludwig D.O., P.T. Board Certified Physical Medicine and Rehabilitation  Board Certified Electrodiagnostic Medicine    Administrations This Visit       bupivacaine (MARCAINE) 0.25 % injection 10 mg       Admin Date  12/23/2022  12:52 Action  Given Dose  10 mg Route  IntraDERmal Site  Other Administered By  Chuy Haji MA    Ordering Provider: Hilary Hansen DO    NDC: 6273-9852-13    Lot#: [id number]    : HOSPIRA    Patient Supplied?: No              ketorolac (TORADOL) injection 15 mg       Admin Date  12/23/2022  12:53 Action  Given Dose  15 mg Route  IntraMUSCular Site  Deltoid Right Administered By  Chuy Haji MA    Ordering Provider: DO Anayeli Lara [address]: [id number]    Lot#: [id number]    : [address]    Patient Supplied?: No                      Jennifer Ludwig D.O. [facility]  [address] [place] [address]  Phone: [phone]  Fax: [phone]    12/23/2022    Chief Complaint   Patient presents with    Migraine     Follow up and possible injections         Last injection: 10/28/22  Taking anticoagulants/antiplatelets: No  Diabetic: No  Febrile/active infection: No    Ambulatory Procedure Time Out  Correct Patient: Yes  Correct Procedure: Yes  Correct Site/Side: Yes  Correct Site(s) Marked: Yes  Informed Consent Signed: Yes  Allergies Verified: Yes  Staff Present & Credential[de-identified] Kori Kearney, [address] [place] [address],     After explaining the indications, risks, benefits and alternatives of a Bilateral occipital nerve block, the patient agreed to proceed. A permit was signed and scanned into the media. The patient was placed in the seated position. The skin was prepared with alcohol. Using an aseptic, no touch technique, a 25 gauge, 5/8\" needle with 2 cc of Bupivicaine 0.25% was directed to the occipital notch at the point of maximal tenderness. After negative aspiration, the medication was injected was injected. Adequate hemostasis was obtained. The patient tolerated the procedure well and was educated in post injection care. The patient was monitored clinically and left the office without incident. Pain was reduced post-injection. Cristofer Delgado D.O., P.T.   Board Certified Physical Medicine and Rehabilitation  Board Certified Electrodiagnostic Medicine    Administrations This Visit       bupivacaine (MARCAINE) 0.25 % injection 10 mg       Admin Date  12/23/2022  12:52 Action  Given Dose  10 mg Route  IntraDERmal Site  Other Administered By  Kori Kearney MA    Ordering Provider: Monica Head DO    NDC: 9272-3141-92    Lot#: [id number]    : HOSPIRA    Patient Supplied?: No              ketorolac (TORADOL) injection 15 mg       Admin Date  12/23/2022  12:53 Action  Given Dose  15 mg Route  IntraMUSCular Site  Deltoid Right Administered By  Kori Kearney MA    Ordering Provider: Lois Verde DO    NDC: 9510-0409-54    Lot#: [id number]    : HOSPIRA    Patient Supplied?: No

## 2022-12-29 RX ORDER — DIPHENHYDRAMINE HYDROCHLORIDE 50 MG/ML
50 INJECTION INTRAMUSCULAR; INTRAVENOUS ONCE
Start: 2023-01-02 | End: 2023-01-02

## 2022-12-29 RX ORDER — ONDANSETRON 2 MG/ML
8 INJECTION INTRAMUSCULAR; INTRAVENOUS ONCE
Start: 2023-01-02 | End: 2023-01-02

## 2022-12-29 RX ORDER — HEPARIN SODIUM (PORCINE) LOCK FLUSH IV SOLN 100 UNIT/ML 100 UNIT/ML
500 SOLUTION INTRAVENOUS PRN
OUTPATIENT
Start: 2023-01-02

## 2022-12-29 RX ORDER — SODIUM CHLORIDE 0.9 % (FLUSH) 0.9 %
5-40 SYRINGE (ML) INJECTION PRN
OUTPATIENT
Start: 2023-01-02

## 2022-12-29 RX ORDER — DEXAMETHASONE SODIUM PHOSPHATE 4 MG/ML
4 INJECTION, SOLUTION INTRA-ARTICULAR; INTRALESIONAL; INTRAMUSCULAR; INTRAVENOUS; SOFT TISSUE ONCE
Start: 2023-01-02 | End: 2023-01-02

## 2022-12-29 RX ORDER — KETOROLAC TROMETHAMINE 30 MG/ML
30 INJECTION, SOLUTION INTRAMUSCULAR; INTRAVENOUS ONCE
Start: 2023-01-02 | End: 2023-01-02

## 2022-12-29 RX ORDER — SODIUM CHLORIDE 9 MG/ML
5-250 INJECTION, SOLUTION INTRAVENOUS PRN
OUTPATIENT
Start: 2023-01-02

## 2022-12-29 RX ORDER — MAGNESIUM SULFATE 1 G/100ML
1000 INJECTION INTRAVENOUS ONCE
Start: 2023-01-02 | End: 2023-01-02

## 2022-12-29 RX ORDER — DIHYDROERGOTAMINE MESYLATE 1 MG/ML
1 INJECTION, SOLUTION INTRAMUSCULAR; INTRAVENOUS; SUBCUTANEOUS ONCE
Start: 2023-01-02 | End: 2023-01-02

## 2023-01-10 ENCOUNTER — OFFICE VISIT (OUTPATIENT)
Dept: PHYSICAL MEDICINE AND REHAB | Age: 58
End: 2023-01-10

## 2023-01-10 VITALS
DIASTOLIC BLOOD PRESSURE: 86 MMHG | HEART RATE: 94 BPM | WEIGHT: 113 LBS | HEIGHT: 62 IN | TEMPERATURE: 97.1 F | BODY MASS INDEX: 20.8 KG/M2 | SYSTOLIC BLOOD PRESSURE: 130 MMHG

## 2023-01-10 DIAGNOSIS — M79.10 TRIGGER POINT: Primary | ICD-10-CM

## 2023-01-10 RX ORDER — LIDOCAINE HYDROCHLORIDE 10 MG/ML
8 INJECTION, SOLUTION INFILTRATION; PERINEURAL ONCE
Status: COMPLETED | OUTPATIENT
Start: 2023-01-10 | End: 2023-01-10

## 2023-01-10 RX ADMIN — LIDOCAINE HYDROCHLORIDE 8 ML: 10 INJECTION, SOLUTION INFILTRATION; PERINEURAL at 16:08

## 2023-01-10 NOTE — PROGRESS NOTES
Joaquín Elias D.O. [place] [facility]  [address] [facility] [place]  Phone: [phone]  Fax: [phone]    1/11/2023    Chief Complaint   Patient presents with    Neck Pain    Headache     Trigger point injections       Last injection: 10/28/22  Taking anticoagulants/antiplatelets: No  Diabetic: No  Febrile/active infection: No    Ambulatory Procedure Time Out  Correct Patient: Yes  Correct Procedure: Yes  Correct Site/Side: Yes  Correct Site(s) Marked: Yes  Informed Consent Signed: Yes  Allergies Verified: Yes  Staff Present & Credential[de-identified] Ledy Christie, [address] [place] [address]    After explaining the indications, risks, benefits and alternatives of a bilateral trapezius, bilateral rhomboid trigger point, the patient agreed to proceed. A permit was signed and scanned into the chart. The patient was placed in the  seated position. The skin over the trigger point was prepared with alcohol. Using aseptic no touch technique, a 22 gauge, 1 1/2\" needle with 8 cc of Xylocaine 1% was directed into the trigger point. After negative aspiration, 2 cc of the medication was injected in a fanning out method at each of 4 trigger points  Adequate hemostasis was achieved and a bandage applied. The patient tolerated the procedure well and was educated in post injection care. The patient was clinically monitored after the injection and left the office without incident. There was post-injection reduction in pain and improved range of motion. Joaquín Elias D.O., P.T.   Board Certified Physical Medicine and Rehabilitation  Board Certified Electrodiagnostic Medicine    Administrations This Visit       lidocaine 1 % injection 8 mL       Admin Date  01/10/2023  16:08 Action  Given Dose  8 mL Route  Other Site   Administered By  Kaleb Nesbitt RN    Ordering Provider: DO Anayeli Gimenez 47: [id number]    Lot#: [id number]    : [facility]    Patient Supplied?: No

## 2023-02-08 DIAGNOSIS — G43.709 CHRONIC MIGRAINE WITHOUT AURA WITHOUT STATUS MIGRAINOSUS, NOT INTRACTABLE: ICD-10-CM

## 2023-02-09 NOTE — TELEPHONE ENCOUNTER
Patient last visit 1-10-23 next visit 2-22-23. Patient requesting refill on Fioricet -40 mg 1 capsule bid prn sent 11-21-22 #60 2 refills  please advise.

## 2023-02-10 RX ORDER — BUTALBITAL, ACETAMINOPHEN AND CAFFEINE 300; 40; 50 MG/1; MG/1; MG/1
1 CAPSULE ORAL 2 TIMES DAILY PRN
Qty: 60 CAPSULE | Refills: 2 | Status: SHIPPED | OUTPATIENT
Start: 2023-02-10

## 2023-02-22 ENCOUNTER — OFFICE VISIT (OUTPATIENT)
Dept: PHYSICAL MEDICINE AND REHAB | Age: 58
End: 2023-02-22

## 2023-02-22 VITALS
SYSTOLIC BLOOD PRESSURE: 129 MMHG | WEIGHT: 114 LBS | HEART RATE: 90 BPM | HEIGHT: 62 IN | TEMPERATURE: 97.5 F | BODY MASS INDEX: 20.98 KG/M2 | DIASTOLIC BLOOD PRESSURE: 86 MMHG

## 2023-02-22 DIAGNOSIS — M54.2 TRIGGER POINT OF NECK: Primary | ICD-10-CM

## 2023-02-22 DIAGNOSIS — M54.81 BILATERAL OCCIPITAL NEURALGIA: ICD-10-CM

## 2023-02-22 RX ORDER — BUPIVACAINE HYDROCHLORIDE 2.5 MG/ML
4 INJECTION, SOLUTION INFILTRATION; PERINEURAL ONCE
Status: COMPLETED | OUTPATIENT
Start: 2023-02-22 | End: 2023-02-22

## 2023-02-22 RX ORDER — LIDOCAINE HYDROCHLORIDE 10 MG/ML
4 INJECTION, SOLUTION INFILTRATION; PERINEURAL ONCE
Status: COMPLETED | OUTPATIENT
Start: 2023-02-22 | End: 2023-02-22

## 2023-02-22 RX ADMIN — BUPIVACAINE HYDROCHLORIDE 10 MG: 2.5 INJECTION, SOLUTION INFILTRATION; PERINEURAL at 15:09

## 2023-02-22 RX ADMIN — LIDOCAINE HYDROCHLORIDE 4 ML: 10 INJECTION, SOLUTION INFILTRATION; PERINEURAL at 15:10

## 2023-02-22 NOTE — PROGRESS NOTES
Jessa Monsalve D.O. [place] [facility]  [address] [facility]  Phone: [phone]  Fax: [phone]    2/22/2023    Chief Complaint   Patient presents with    Migraine     Trigger point injections       Last injection: 1/10/23  Taking anticoagulants/antiplatelets: No  Diabetic: No  Febrile/active infection: No    Ambulatory Procedure Time Out  Correct Patient: Yes  Correct Procedure: Yes  Correct Site/Side: Yes  Correct Site(s) Marked: Yes  Informed Consent Signed: Yes  Allergies Verified: Yes  Staff Present & Credential[de-identified] Bhumika Abreu LPN, Dr. Tod Vogel DO    After explaining the indications, risks, benefits and alternatives of a Bilateral trapezius and right rhomboid trigger point, the patient agreed to proceed. A permit was signed and scanned into the chart. The patient was placed in the  seated  position. The skin over the trigger point was prepared with alcohol. Using aseptic no touch technique, a 22 gauge, 1 1/2\" needle with 6 cc of Xylocaine 1% was directed into the trigger point. After negative aspiration, 2 cc of the medication was injected in a fanning out method. Adequate hemostasis was achieved and a bandage applied. The patient tolerated the procedure well and was educated in post injection care. The patient was clinically monitored after the injection and left the office without incident. There was post-injection reduction in pain and improved range of motion. Jessa Monsalve D.O., P.T.   Board Certified Physical Medicine and Rehabilitation  Board Certified Electrodiagnostic Medicine    Administrations This Visit       bupivacaine (MARCAINE) 0.25 % injection 10 mg       Admin Date  02/22/2023  15:09 Action  Given Dose  10 mg Route  IntraDERmal Site  Other Administered By  Lashon Zhu MA    Ordering Provider: Clifford Tucker DO    NDC: 3333-8140-11    Lot#: [id number]    : [facility]    Patient Supplied?: No              lidocaine 1 % injection 4 mL       Admin Date  02/22/2023  15:10 Action  Given Dose  4 mL Route  Other Site   Administered By  Chuy Haji MA    Ordering Provider: Hilary Hansen DO    NDC: 8422-8162-58    Lot#: [id number]    : [address]    Patient Supplied?: No                    Jennifer Ludwig D.O. [place] [facility]  [address] [facility]. [address] [place] [address]  Phone: [phone]  Fax: [phone]    2/22/2023    Chief Complaint   Patient presents with    Migraine     Trigger point injections       Last injection: 12/23/22  Taking anticoagulants/antiplatelets: No  Diabetic: No  Febrile/active infection: No    Ambulatory Procedure Time Out  Correct Patient: Yes  Correct Procedure: Yes  Correct Site/Side: Yes  Correct Site(s) Marked: Yes  Informed Consent Signed: Yes  Allergies Verified: Yes  Staff Present & Credential[de-identified] Stan Castillo LPN, Dr. Dickinson January DO    After explaining the indications, risks, benefits and alternatives of a Bilateral occipital nerve block, the patient agreed to proceed. A permit was signed and scanned into the media. The patient was placed in the seated position. The skin was prepared with alcohol. Using an aseptic, no touch technique, a 25 gauge, 5/8\" needle with 4  cc of Bupivicaine 0.25% was directed to the occipital notch at the point of maximal tenderness and 2 cc were injected bilaterally. After negative aspiration, the medication was injected was injected. Adequate hemostasis was obtained. The patient tolerated the procedure well and was educated in post injection care. The patient was monitored clinically and left the office without incident. Pain was reduced post-injection. Jennifer Ludwig D.O., P.T.   Board Certified Physical Medicine and Rehabilitation  Board Certified Electrodiagnostic Medicine    Administrations This Visit       bupivacaine (MARCAINE) 0.25 % injection 10 mg       Admin Date  02/22/2023  15:09 Action  Given Dose  10 mg Route  IntraDERmal Site  Other Administered By  Yasmin Jara MA    Ordering Provider: Tena Luu DO    NDC: 8024-5040-70    Lot#: [id number]    : [facility]    Patient Supplied?: No              lidocaine 1 % injection 4 mL       Admin Date  02/22/2023  15:10 Action  Given Dose  4 mL Route  Other Site   Administered By  Yasmin Jara MA    Ordering Provider: Tena Luu DO    NDC: 5962-0522-19    Lot#: [id number]    : [address]    Patient Supplied?: No

## 2023-03-10 NOTE — TELEPHONE ENCOUNTER
Patient requesting refills on her Zomig. States My insurance allows me to get 3 tablets every 7 days Please advise, medication pended but not as the 3 tablets every 7 days.

## 2023-03-13 RX ORDER — ZOLMITRIPTAN 5 MG/1
TABLET, FILM COATED ORAL
Qty: 6 TABLET | Refills: 5 | Status: SHIPPED | OUTPATIENT
Start: 2023-03-13

## 2023-03-29 ENCOUNTER — OFFICE VISIT (OUTPATIENT)
Dept: PHYSICAL MEDICINE AND REHAB | Age: 58
End: 2023-03-29
Payer: COMMERCIAL

## 2023-03-29 VITALS
WEIGHT: 115 LBS | HEART RATE: 93 BPM | HEIGHT: 62 IN | DIASTOLIC BLOOD PRESSURE: 82 MMHG | SYSTOLIC BLOOD PRESSURE: 123 MMHG | BODY MASS INDEX: 21.16 KG/M2 | TEMPERATURE: 97.3 F

## 2023-03-29 DIAGNOSIS — G43.709 CHRONIC MIGRAINE WITHOUT AURA WITHOUT STATUS MIGRAINOSUS, NOT INTRACTABLE: ICD-10-CM

## 2023-03-29 DIAGNOSIS — M79.10 TRIGGER POINT: Primary | ICD-10-CM

## 2023-03-29 PROCEDURE — 20553 NJX 1/MLT TRIGGER POINTS 3/>: CPT | Performed by: PHYSICAL MEDICINE & REHABILITATION

## 2023-03-29 RX ORDER — BUTALBITAL, ACETAMINOPHEN AND CAFFEINE 300; 40; 50 MG/1; MG/1; MG/1
1 CAPSULE ORAL 2 TIMES DAILY PRN
Qty: 60 CAPSULE | Refills: 2 | Status: SHIPPED | OUTPATIENT
Start: 2023-03-29

## 2023-03-29 RX ORDER — LIDOCAINE HYDROCHLORIDE 10 MG/ML
8 INJECTION, SOLUTION INFILTRATION; PERINEURAL ONCE
Status: COMPLETED | OUTPATIENT
Start: 2023-03-29 | End: 2023-03-29

## 2023-03-29 RX ADMIN — LIDOCAINE HYDROCHLORIDE 8 ML: 10 INJECTION, SOLUTION INFILTRATION; PERINEURAL at 16:22

## 2023-03-29 NOTE — PROGRESS NOTES
Billy Moreno D.O. [place] [facility]  [address] [facility] [address] [facility] [place]  Phone: [phone]  Fax: [phone]    3/29/2023    Chief Complaint   Patient presents with    Neck Pain     Trigger point injections       Last injection: 2/22/23  Taking anticoagulants/antiplatelets: No  Diabetic: No  Febrile/active infection: No    Ambulatory Procedure Time Out  Correct Patient: Yes  Correct Procedure: Yes  Correct Site/Side: Yes  Correct Site(s) Marked: Yes  Informed Consent Signed: Yes  Allergies Verified: Yes  Staff Present & Credential[de-identified] Allan Lawrence LPN, Dr. Marjan Gao    After explaining the indications, risks, benefits and alternatives of a Bilateral trapezius and bilateral rhomboid trigger point X4 , the patient agreed to proceed. A permit was signed and scanned into the chart. The patient was placed in the  seated position. The skin over the trigger point was prepared with alcohol. Using aseptic no touch technique, a 22 gauge, 1 1/2\" needle with 8 cc of Xylocaine 1% was directed into the trigger point. After negative aspiration, 2 cc of the medication was injected in a fanning out method at each of 4 trigger points. Adequate hemostasis was achieved and a bandage applied. The patient tolerated the procedure well and was educated in post injection care. The patient was clinically monitored after the injection and left the office without incident. There was post-injection reduction in pain and improved range of motion. Billy Moreno D.O., P.T.   Board Certified Physical Medicine and Rehabilitation  Board Certified Electrodiagnostic Medicine    Administrations This Visit       lidocaine 1 % injection 8 mL       Admin Date  03/29/2023  16:22 Action  Given Dose  8 mL Route  Other Site   Administered By  Carrie Hoffman MA    Ordering Provider: DO Anayeli Gtz 47: [id number]    Lot#: [id number]    [id number]: [address]    Patient Supplied?:

## 2023-04-06 ENCOUNTER — PATIENT MESSAGE (OUTPATIENT)
Dept: PHYSICAL MEDICINE AND REHAB | Age: 58
End: 2023-04-06

## 2023-04-06 NOTE — TELEPHONE ENCOUNTER
From: Nishant Norwood  To: Dr. Liam Sol: 4/6/2023 10:18 AM EDT  Subject: Botox again? Ailynlo Dr. Altaf Rangel and ladies,     I hope you all have a fabulous long holiday weekend! I just wanted to reach out and ask Dr. Altaf Rangel if maybe I should try Botox injections again. I was looking at my last 4 months data and my # of migraines/month have increased by 3-4 since I stopped getting the Botox injections. I just realized it. I know this would require planning and ordering. This is why I sent the message. Im just trying to reduce my # of migraines. Let me know. Happy Kellya Ibeth!     Blanca Gardiner

## 2023-04-07 NOTE — TELEPHONE ENCOUNTER
Insurance called in regarding prior auth for botox and questions were answered over the phone case number [id number], he stated determination could take up to 5 business days.

## 2023-04-21 ENCOUNTER — TELEPHONE (OUTPATIENT)
Dept: PHYSICAL MEDICINE AND REHAB | Age: 58
End: 2023-04-21

## 2023-04-21 RX ORDER — UBROGEPANT 50 MG/1
50 TABLET ORAL DAILY PRN
Qty: 16 TABLET | Refills: 2 | Status: SHIPPED | OUTPATIENT
Start: 2023-04-21

## 2023-05-03 ENCOUNTER — OFFICE VISIT (OUTPATIENT)
Dept: PHYSICAL MEDICINE AND REHAB | Age: 58
End: 2023-05-03
Payer: COMMERCIAL

## 2023-05-03 VITALS
SYSTOLIC BLOOD PRESSURE: 131 MMHG | DIASTOLIC BLOOD PRESSURE: 77 MMHG | HEART RATE: 106 BPM | HEIGHT: 62 IN | BODY MASS INDEX: 20.61 KG/M2 | WEIGHT: 112 LBS | TEMPERATURE: 97.2 F

## 2023-05-03 DIAGNOSIS — M54.81 BILATERAL OCCIPITAL NEURALGIA: ICD-10-CM

## 2023-05-03 DIAGNOSIS — G43.709 CHRONIC MIGRAINE WITHOUT AURA WITHOUT STATUS MIGRAINOSUS, NOT INTRACTABLE: Primary | ICD-10-CM

## 2023-05-03 PROCEDURE — 20553 NJX 1/MLT TRIGGER POINTS 3/>: CPT | Performed by: PHYSICAL MEDICINE & REHABILITATION

## 2023-05-03 PROCEDURE — 64405 NJX AA&/STRD GR OCPL NRV: CPT | Performed by: PHYSICAL MEDICINE & REHABILITATION

## 2023-05-03 RX ORDER — BUPIVACAINE HYDROCHLORIDE 2.5 MG/ML
30 INJECTION, SOLUTION INFILTRATION; PERINEURAL ONCE
Status: COMPLETED | OUTPATIENT
Start: 2023-05-03 | End: 2023-05-03

## 2023-05-03 RX ORDER — LIDOCAINE HYDROCHLORIDE 10 MG/ML
4 INJECTION, SOLUTION INFILTRATION; PERINEURAL ONCE
Status: COMPLETED | OUTPATIENT
Start: 2023-05-03 | End: 2023-05-03

## 2023-05-03 RX ADMIN — LIDOCAINE HYDROCHLORIDE 4 ML: 10 INJECTION, SOLUTION INFILTRATION; PERINEURAL at 16:16

## 2023-05-03 RX ADMIN — BUPIVACAINE HYDROCHLORIDE 75 MG: 2.5 INJECTION, SOLUTION INFILTRATION; PERINEURAL at 16:16

## 2023-05-03 NOTE — PROGRESS NOTES
Chuckie Guzmán D.O. [place] [facility]  [address] [facility] [place]  Phone: [phone]  Fax: [phone]    5/3/2023    Chief Complaint   Patient presents with    Neck Pain     Trigger point injection        Last injection: 3/29/23  Taking anticoagulants/antiplatelets: No  Diabetic: No  Febrile/active infection: No    Ambulatory Procedure Time Out  Correct Patient: Yes  Correct Procedure: Yes  Correct Site/Side: Yes  Correct Site(s) Marked: Yes  Informed Consent Signed: Yes  Allergies Verified: Yes  Staff Present & Credential[de-identified] Lucy Bay, [address] [place] [address]    After explaining the indications, risks, benefits and alternatives of a bilateral trapezius and right rhomboid trigger point, the patient agreed to proceed. A permit was signed and scanned into the chart. The patient was placed in the  seated position. The skin over the trigger point was prepared with alcohol. Using aseptic no touch technique, a 22 gauge, 1 1/2\" needle with 2 cc of Xylocaine 1% was directed into the trigger point. After negative aspiration, the medication was injected in a fanning out method. Adequate hemostasis was achieved and a bandage applied. The patient tolerated the procedure well and was educated in post injection care. The patient was clinically monitored after the injection and left the office without incident. There was post-injection reduction in pain and improved range of motion. Injection was performed by Dr. Merle Berrios  PGY1 under my direct supervision and assistance. Chuckie Guzmán D.O., P.T.   Board Certified Physical Medicine and Rehabilitation  Board Certified Electrodiagnostic Medicine    Administrations This Visit       bupivacaine (MARCAINE) 0.25 % injection 75 mg       Admin Date  05/03/2023  16:16 Action  Given Dose  75 mg Route  IntraDERmal Site  Other Administered By  Lucy Bay MA    Ordering Provider: Wanda Gill DO    NDC: 8920-8363-55

## 2023-05-31 RX ORDER — ZOLMITRIPTAN 5 MG/1
TABLET, FILM COATED ORAL
Qty: 6 TABLET | Refills: 5 | Status: SHIPPED | OUTPATIENT
Start: 2023-05-31

## 2023-06-01 NOTE — TELEPHONE ENCOUNTER
Called and spoke with the patient and informed her that her script was sent to her pharmacy. Patient is aware and voiced understanding.

## 2023-06-03 ENCOUNTER — HOSPITAL ENCOUNTER (EMERGENCY)
Age: 58
Discharge: HOME | End: 2023-06-03
Payer: COMMERCIAL

## 2023-06-03 VITALS
RESPIRATION RATE: 18 BRPM | TEMPERATURE: 98.2 F | BODY MASS INDEX: 19.75 KG/M2 | DIASTOLIC BLOOD PRESSURE: 65 MMHG | OXYGEN SATURATION: 100 % | HEART RATE: 91 BPM | WEIGHT: 108 LBS | SYSTOLIC BLOOD PRESSURE: 135 MMHG

## 2023-06-03 DIAGNOSIS — J01.00 ACUTE MAXILLARY SINUSITIS, RECURRENCE NOT SPECIFIED: Primary | ICD-10-CM

## 2023-06-03 PROCEDURE — 99211 OFF/OP EST MAY X REQ PHY/QHP: CPT

## 2023-06-03 RX ORDER — BROMPHENIRAMINE MALEATE, PSEUDOEPHEDRINE HYDROCHLORIDE, AND DEXTROMETHORPHAN HYDROBROMIDE 2; 30; 10 MG/5ML; MG/5ML; MG/5ML
5 SYRUP ORAL 4 TIMES DAILY PRN
Qty: 120 ML | Refills: 0 | Status: SHIPPED | OUTPATIENT
Start: 2023-06-03 | End: 2023-06-08

## 2023-06-03 RX ORDER — PREDNISONE 10 MG/1
TABLET ORAL
Qty: 20 TABLET | Refills: 0 | Status: SHIPPED | OUTPATIENT
Start: 2023-06-03 | End: 2023-06-13

## 2023-06-03 RX ORDER — AZITHROMYCIN 250 MG/1
TABLET, FILM COATED ORAL
Qty: 1 PACKET | Refills: 0 | Status: SHIPPED | OUTPATIENT
Start: 2023-06-03 | End: 2023-06-13

## 2023-06-03 RX ORDER — AZELASTINE 1 MG/ML
1 SPRAY, METERED NASAL 2 TIMES DAILY
COMMUNITY

## 2023-06-03 RX ORDER — FLUTICASONE PROPIONATE 50 MCG
1 SPRAY, SUSPENSION (ML) NASAL DAILY
COMMUNITY

## 2023-06-03 NOTE — ED PROVIDER NOTES
Normal range of motion is achieved in the neck. There is no JVD present. No meningeal signs are present   Anterior cervical adenopathy is normal.  Respiratory/chest: The chest is nontender. Breath sounds are normal. There is no respiratory distress. Cardiovascular: Heart shows a regular rate and rhythm without murmurs clicks or gallops. Abdominal exam: The abdomen is non tender without evidence of peritoneal signs. Specific attention to the left upper quadrant with palpation of the spleen demonstrates no organomegaly or tenderness  Skin: warm and dry, without rash  Neurologic: GCS 15   Psych: Normal Affect  -------------------------------------------------- RESULTS -------------------------------------------------    LABS:  No results found for this visit on 06/03/23. RADIOLOGY:  Interpreted by Radiologist.  No orders to display         ------------------------------ ED COURSE/MEDICAL DECISION MAKING----------------------  Medications - No data to display          Medical Decision Making:       presents stating that she just finished a Z-Moises on Tuesday for sinus infection however states that she has had worsening sinus pressure, headache and is uncertain if this is related to her chronic migraines or if it is a return of the sinus infection. States she has taken Tylenol, Zomig but with no relief in symptoms. She is reporting some postnasal drip, nasal congestion rhinorrhea. She has been taking Sudafed but with minimal relief as well. Denies any fever. Denies any chest pain or shortness of breath. Not hypoxic, nothing to suggests pneumonia. Well appearing, non toxic, appropriate for outpatient management. Plan is for symptom management and PCP follow up. This patient's ED course included: a personal history and physicial eaxmination and re-evaluation prior to disposition    This patient has remained hemodynamically stable during their ED course. Counseling:    The emergency provider has spoken

## 2023-06-07 ENCOUNTER — OFFICE VISIT (OUTPATIENT)
Dept: PHYSICAL MEDICINE AND REHAB | Age: 58
End: 2023-06-07

## 2023-06-07 VITALS
HEIGHT: 62 IN | TEMPERATURE: 97.1 F | BODY MASS INDEX: 20.43 KG/M2 | WEIGHT: 111 LBS | DIASTOLIC BLOOD PRESSURE: 81 MMHG | SYSTOLIC BLOOD PRESSURE: 136 MMHG | HEART RATE: 97 BPM

## 2023-06-07 DIAGNOSIS — M79.10 TRIGGER POINT: Primary | ICD-10-CM

## 2023-06-07 RX ORDER — UBROGEPANT 100 MG/1
100 TABLET ORAL DAILY PRN
Qty: 16 TABLET | Refills: 11 | Status: SHIPPED | OUTPATIENT
Start: 2023-06-07

## 2023-06-07 RX ORDER — PROMETHAZINE HYDROCHLORIDE 12.5 MG/1
12.5 SUPPOSITORY RECTAL EVERY 6 HOURS PRN
Qty: 40 SUPPOSITORY | Refills: 2 | Status: SHIPPED | OUTPATIENT
Start: 2023-06-07 | End: 2023-07-07

## 2023-06-07 RX ORDER — LIDOCAINE HYDROCHLORIDE 10 MG/ML
4 INJECTION, SOLUTION INFILTRATION; PERINEURAL ONCE
Status: COMPLETED | OUTPATIENT
Start: 2023-06-07 | End: 2023-06-07

## 2023-06-07 RX ORDER — PROMETHAZINE HYDROCHLORIDE 25 MG/1
25 TABLET ORAL 4 TIMES DAILY PRN
Qty: 60 TABLET | Refills: 5 | Status: SHIPPED | OUTPATIENT
Start: 2023-06-07 | End: 2023-09-05

## 2023-06-07 RX ADMIN — LIDOCAINE HYDROCHLORIDE 4 ML: 10 INJECTION, SOLUTION INFILTRATION; PERINEURAL at 11:11

## 2023-06-18 ENCOUNTER — HOSPITAL ENCOUNTER (EMERGENCY)
Age: 58
Discharge: HOME | End: 2023-06-18
Attending: EMERGENCY MEDICINE
Payer: COMMERCIAL

## 2023-06-18 ENCOUNTER — APPOINTMENT (OUTPATIENT)
Dept: ULTRASOUND IMAGING | Age: 58
End: 2023-06-18
Payer: COMMERCIAL

## 2023-06-18 VITALS
DIASTOLIC BLOOD PRESSURE: 65 MMHG | RESPIRATION RATE: 18 BRPM | SYSTOLIC BLOOD PRESSURE: 112 MMHG | OXYGEN SATURATION: 98 % | HEART RATE: 98 BPM | TEMPERATURE: 98.2 F

## 2023-06-18 DIAGNOSIS — M25.461 KNEE EFFUSION, RIGHT: Primary | ICD-10-CM

## 2023-06-18 DIAGNOSIS — M17.10 PRIMARY OSTEOARTHRITIS OF KNEE, UNSPECIFIED LATERALITY: ICD-10-CM

## 2023-06-18 LAB
ANION GAP SERPL CALCULATED.3IONS-SCNC: 12 MMOL/L (ref 7–16)
BASOPHILS # BLD: 0.05 E9/L (ref 0–0.2)
BASOPHILS NFR BLD: 0.3 % (ref 0–2)
BUN SERPL-MCNC: 15 MG/DL (ref 6–20)
CALCIUM SERPL-MCNC: 8.5 MG/DL (ref 8.6–10.2)
CHLORIDE SERPL-SCNC: 92 MMOL/L (ref 98–107)
CO2 SERPL-SCNC: 23 MMOL/L (ref 22–29)
CREAT SERPL-MCNC: 0.6 MG/DL (ref 0.5–1)
CRP SERPL HS-MCNC: 5.8 MG/DL (ref 0–0.4)
EOSINOPHIL # BLD: 0.07 E9/L (ref 0.05–0.5)
EOSINOPHIL NFR BLD: 0.5 % (ref 0–6)
ERYTHROCYTE [DISTWIDTH] IN BLOOD BY AUTOMATED COUNT: 13.1 FL (ref 11.5–15)
ERYTHROCYTE [SEDIMENTATION RATE] IN BLOOD BY WESTERGREN METHOD: 12 MM/HR (ref 0–20)
GLUCOSE SERPL-MCNC: 93 MG/DL (ref 74–99)
HCT VFR BLD AUTO: 40.2 % (ref 34–48)
HGB BLD-MCNC: 13.1 G/DL (ref 11.5–15.5)
IMM GRANULOCYTES # BLD: 0.08 E9/L
IMM GRANULOCYTES NFR BLD: 0.5 % (ref 0–5)
LYMPHOCYTES # BLD: 1.2 E9/L (ref 1.5–4)
LYMPHOCYTES NFR BLD: 8 % (ref 20–42)
MAGNESIUM SERPL-MCNC: 1.9 MG/DL (ref 1.6–2.6)
MCH RBC QN AUTO: 30 PG (ref 26–35)
MCHC RBC AUTO-ENTMCNC: 32.6 % (ref 32–34.5)
MCV RBC AUTO: 92.2 FL (ref 80–99.9)
MONOCYTES # BLD: 1.09 E9/L (ref 0.1–0.95)
MONOCYTES NFR BLD: 7.3 % (ref 2–12)
NEUTROPHILS # BLD: 12.54 E9/L (ref 1.8–7.3)
NEUTS SEG NFR BLD: 83.4 % (ref 43–80)
PLATELET # BLD AUTO: 354 E9/L (ref 130–450)
PMV BLD AUTO: 9.3 FL (ref 7–12)
POTASSIUM SERPL-SCNC: 3.2 MMOL/L (ref 3.5–5)
RBC # BLD AUTO: 4.36 E12/L (ref 3.5–5.5)
SODIUM SERPL-SCNC: 127 MMOL/L (ref 132–146)
URATE SERPL-MCNC: 3 MG/DL (ref 2.4–5.7)
WBC # BLD: 15 E9/L (ref 4.5–11.5)

## 2023-06-18 PROCEDURE — 86140 C-REACTIVE PROTEIN: CPT

## 2023-06-18 PROCEDURE — 6370000000 HC RX 637 (ALT 250 FOR IP): Performed by: PHYSICIAN ASSISTANT

## 2023-06-18 PROCEDURE — 85651 RBC SED RATE NONAUTOMATED: CPT

## 2023-06-18 PROCEDURE — 99284 EMERGENCY DEPT VISIT MOD MDM: CPT

## 2023-06-18 PROCEDURE — 36415 COLL VENOUS BLD VENIPUNCTURE: CPT

## 2023-06-18 PROCEDURE — 80048 BASIC METABOLIC PNL TOTAL CA: CPT

## 2023-06-18 PROCEDURE — 83735 ASSAY OF MAGNESIUM: CPT

## 2023-06-18 PROCEDURE — 85025 COMPLETE CBC W/AUTO DIFF WBC: CPT

## 2023-06-18 PROCEDURE — 93970 EXTREMITY STUDY: CPT

## 2023-06-18 PROCEDURE — 84550 ASSAY OF BLOOD/URIC ACID: CPT

## 2023-06-18 RX ORDER — CEPHALEXIN 500 MG/1
500 CAPSULE ORAL 4 TIMES DAILY
Qty: 40 CAPSULE | Refills: 0 | Status: SHIPPED | OUTPATIENT
Start: 2023-06-18 | End: 2023-06-28

## 2023-06-18 RX ORDER — HYDROCODONE BITARTRATE AND ACETAMINOPHEN 5; 325 MG/1; MG/1
1 TABLET ORAL ONCE
Status: COMPLETED | OUTPATIENT
Start: 2023-06-18 | End: 2023-06-18

## 2023-06-18 RX ORDER — CEPHALEXIN 250 MG/1
500 CAPSULE ORAL ONCE
Status: COMPLETED | OUTPATIENT
Start: 2023-06-18 | End: 2023-06-18

## 2023-06-18 RX ORDER — HYDROCODONE BITARTRATE AND ACETAMINOPHEN 5; 325 MG/1; MG/1
1 TABLET ORAL EVERY 6 HOURS PRN
Qty: 12 TABLET | Refills: 0 | Status: SHIPPED | OUTPATIENT
Start: 2023-06-18 | End: 2023-06-21

## 2023-06-18 RX ADMIN — HYDROCODONE BITARTRATE AND ACETAMINOPHEN 1 TABLET: 5; 325 TABLET ORAL at 13:12

## 2023-06-18 RX ADMIN — CEPHALEXIN 500 MG: 250 CAPSULE ORAL at 13:12

## 2023-06-20 ENCOUNTER — TELEPHONE (OUTPATIENT)
Dept: ORTHOPEDIC SURGERY | Age: 58
End: 2023-06-20

## 2023-06-20 NOTE — TELEPHONE ENCOUNTER
LVM to return call to [facility] Navigator at [phone] to schedule f/u appointment for bilateral knee pain and swelling.

## 2023-06-22 DIAGNOSIS — G43.709 CHRONIC MIGRAINE WITHOUT AURA WITHOUT STATUS MIGRAINOSUS, NOT INTRACTABLE: ICD-10-CM

## 2023-06-22 RX ORDER — BUTALBITAL, ACETAMINOPHEN AND CAFFEINE 300; 40; 50 MG/1; MG/1; MG/1
1 CAPSULE ORAL 2 TIMES DAILY PRN
Qty: 60 CAPSULE | Refills: 2 | Status: SHIPPED | OUTPATIENT
Start: 2023-06-22

## 2023-06-22 NOTE — TELEPHONE ENCOUNTER
Patient sent refill request on Fioricet sent 3-29-23 #60 2 refills. Patient sent mychart refill request.  Please advise.

## 2023-06-28 ENCOUNTER — HOSPITAL ENCOUNTER (OUTPATIENT)
Dept: INFUSION THERAPY | Age: 58
Setting detail: INFUSION SERIES
Discharge: HOME | End: 2023-06-28
Payer: COMMERCIAL

## 2023-06-28 VITALS
TEMPERATURE: 98.2 F | DIASTOLIC BLOOD PRESSURE: 79 MMHG | SYSTOLIC BLOOD PRESSURE: 149 MMHG | HEART RATE: 92 BPM | OXYGEN SATURATION: 98 % | RESPIRATION RATE: 18 BRPM

## 2023-06-28 DIAGNOSIS — G43.709 CHRONIC MIGRAINE WITHOUT AURA WITHOUT STATUS MIGRAINOSUS, NOT INTRACTABLE: Primary | ICD-10-CM

## 2023-06-28 LAB
ALBUMIN SERPL-MCNC: 3.9 G/DL (ref 3.5–5.2)
ALP SERPL-CCNC: 79 U/L (ref 35–104)
ALT SERPL-CCNC: 21 U/L (ref 0–32)
ANION GAP SERPL CALCULATED.3IONS-SCNC: 11 MMOL/L (ref 7–16)
AST SERPL-CCNC: 23 U/L (ref 0–31)
BILIRUB SERPL-MCNC: 0.2 MG/DL (ref 0–1.2)
BUN SERPL-MCNC: 13 MG/DL (ref 6–20)
CALCIUM SERPL-MCNC: 8.4 MG/DL (ref 8.6–10.2)
CHLORIDE SERPL-SCNC: 99 MMOL/L (ref 98–107)
CO2 SERPL-SCNC: 25 MMOL/L (ref 22–29)
CREAT SERPL-MCNC: 0.6 MG/DL (ref 0.5–1)
GLUCOSE SERPL-MCNC: 91 MG/DL (ref 74–99)
POTASSIUM SERPL-SCNC: 3.5 MMOL/L (ref 3.5–5)
PROT SERPL-MCNC: 6.3 G/DL (ref 6.4–8.3)
SODIUM SERPL-SCNC: 135 MMOL/L (ref 132–146)

## 2023-06-28 PROCEDURE — 80053 COMPREHEN METABOLIC PANEL: CPT

## 2023-06-28 PROCEDURE — 36415 COLL VENOUS BLD VENIPUNCTURE: CPT

## 2023-06-28 PROCEDURE — 96365 THER/PROPH/DIAG IV INF INIT: CPT

## 2023-06-28 PROCEDURE — 96375 TX/PRO/DX INJ NEW DRUG ADDON: CPT

## 2023-06-28 PROCEDURE — 2500000003 HC RX 250 WO HCPCS: Performed by: PHYSICAL MEDICINE & REHABILITATION

## 2023-06-28 PROCEDURE — 96367 TX/PROPH/DG ADDL SEQ IV INF: CPT

## 2023-06-28 PROCEDURE — 6360000002 HC RX W HCPCS: Performed by: PHYSICAL MEDICINE & REHABILITATION

## 2023-06-28 PROCEDURE — 2580000003 HC RX 258: Performed by: PHYSICAL MEDICINE & REHABILITATION

## 2023-06-28 RX ORDER — DIPHENHYDRAMINE HYDROCHLORIDE 50 MG/ML
50 INJECTION INTRAMUSCULAR; INTRAVENOUS ONCE
Status: COMPLETED | OUTPATIENT
Start: 2023-06-28 | End: 2023-06-28

## 2023-06-28 RX ORDER — KETOROLAC TROMETHAMINE 30 MG/ML
30 INJECTION, SOLUTION INTRAMUSCULAR; INTRAVENOUS ONCE
Status: CANCELLED
Start: 2023-06-29 | End: 2023-06-29

## 2023-06-28 RX ORDER — DIHYDROERGOTAMINE MESYLATE 1 MG/ML
1 INJECTION, SOLUTION INTRAMUSCULAR; INTRAVENOUS; SUBCUTANEOUS ONCE
Status: CANCELLED
Start: 2023-06-29 | End: 2023-06-29

## 2023-06-28 RX ORDER — MAGNESIUM SULFATE 1 G/100ML
1000 INJECTION INTRAVENOUS ONCE
Status: CANCELLED
Start: 2023-06-29 | End: 2023-06-29

## 2023-06-28 RX ORDER — MAGNESIUM SULFATE 1 G/100ML
1000 INJECTION INTRAVENOUS ONCE
Status: COMPLETED | OUTPATIENT
Start: 2023-06-28 | End: 2023-06-28

## 2023-06-28 RX ORDER — DEXAMETHASONE SODIUM PHOSPHATE 4 MG/ML
4 INJECTION, SOLUTION INTRA-ARTICULAR; INTRALESIONAL; INTRAMUSCULAR; INTRAVENOUS; SOFT TISSUE ONCE
Status: CANCELLED
Start: 2023-06-29 | End: 2023-06-29

## 2023-06-28 RX ORDER — SODIUM CHLORIDE 9 MG/ML
5-250 INJECTION, SOLUTION INTRAVENOUS PRN
Status: CANCELLED | OUTPATIENT
Start: 2023-06-29

## 2023-06-28 RX ORDER — HEPARIN SODIUM (PORCINE) LOCK FLUSH IV SOLN 100 UNIT/ML 100 UNIT/ML
500 SOLUTION INTRAVENOUS PRN
Status: CANCELLED | OUTPATIENT
Start: 2023-06-29

## 2023-06-28 RX ORDER — KETOROLAC TROMETHAMINE 30 MG/ML
30 INJECTION, SOLUTION INTRAMUSCULAR; INTRAVENOUS ONCE
Status: COMPLETED | OUTPATIENT
Start: 2023-06-28 | End: 2023-06-28

## 2023-06-28 RX ORDER — DIHYDROERGOTAMINE MESYLATE 1 MG/ML
1 INJECTION, SOLUTION INTRAMUSCULAR; INTRAVENOUS; SUBCUTANEOUS ONCE
Status: COMPLETED | OUTPATIENT
Start: 2023-06-28 | End: 2023-06-28

## 2023-06-28 RX ORDER — SODIUM CHLORIDE 0.9 % (FLUSH) 0.9 %
5-40 SYRINGE (ML) INJECTION PRN
Status: CANCELLED | OUTPATIENT
Start: 2023-06-29

## 2023-06-28 RX ORDER — ONDANSETRON 2 MG/ML
8 INJECTION INTRAMUSCULAR; INTRAVENOUS ONCE
Status: CANCELLED
Start: 2023-06-29 | End: 2023-06-29

## 2023-06-28 RX ORDER — SODIUM CHLORIDE 0.9 % (FLUSH) 0.9 %
5-40 SYRINGE (ML) INJECTION PRN
Status: DISCONTINUED | OUTPATIENT
Start: 2023-06-28 | End: 2023-06-29 | Stop reason: HOSPADM

## 2023-06-28 RX ORDER — ONDANSETRON 2 MG/ML
8 INJECTION INTRAMUSCULAR; INTRAVENOUS ONCE
Status: COMPLETED | OUTPATIENT
Start: 2023-06-28 | End: 2023-06-28

## 2023-06-28 RX ORDER — DEXAMETHASONE SODIUM PHOSPHATE 4 MG/ML
4 INJECTION, SOLUTION INTRA-ARTICULAR; INTRALESIONAL; INTRAMUSCULAR; INTRAVENOUS; SOFT TISSUE ONCE
Status: COMPLETED | OUTPATIENT
Start: 2023-06-28 | End: 2023-06-28

## 2023-06-28 RX ORDER — DIPHENHYDRAMINE HYDROCHLORIDE 50 MG/ML
50 INJECTION INTRAMUSCULAR; INTRAVENOUS ONCE
Status: CANCELLED
Start: 2023-06-29 | End: 2023-06-29

## 2023-06-28 RX ADMIN — SODIUM CHLORIDE, PRESERVATIVE FREE 10 ML: 5 INJECTION INTRAVENOUS at 13:18

## 2023-06-28 RX ADMIN — VALPROATE SODIUM 500 MG: 100 INJECTION, SOLUTION INTRAVENOUS at 13:30

## 2023-06-28 RX ADMIN — DIHYDROERGOTAMINE MESYLATE 1 MG: 1 INJECTION, SOLUTION INTRAMUSCULAR; INTRAVENOUS; SUBCUTANEOUS at 12:05

## 2023-06-28 RX ADMIN — SODIUM CHLORIDE, PRESERVATIVE FREE 10 ML: 5 INJECTION INTRAVENOUS at 11:29

## 2023-06-28 RX ADMIN — DIPHENHYDRAMINE HYDROCHLORIDE 50 MG: 50 INJECTION, SOLUTION INTRAMUSCULAR; INTRAVENOUS at 11:30

## 2023-06-28 RX ADMIN — SODIUM CHLORIDE, PRESERVATIVE FREE 10 ML: 5 INJECTION INTRAVENOUS at 14:24

## 2023-06-28 RX ADMIN — SODIUM CHLORIDE, PRESERVATIVE FREE 10 ML: 5 INJECTION INTRAVENOUS at 14:26

## 2023-06-28 RX ADMIN — SODIUM CHLORIDE, PRESERVATIVE FREE 10 ML: 5 INJECTION INTRAVENOUS at 11:07

## 2023-06-28 RX ADMIN — SODIUM CHLORIDE, PRESERVATIVE FREE 10 ML: 5 INJECTION INTRAVENOUS at 12:11

## 2023-06-28 RX ADMIN — MAGNESIUM SULFATE HEPTAHYDRATE 1000 MG: 1 INJECTION, SOLUTION INTRAVENOUS at 12:11

## 2023-06-28 RX ADMIN — DEXAMETHASONE SODIUM PHOSPHATE 4 MG: 4 INJECTION, SOLUTION INTRAMUSCULAR; INTRAVENOUS at 11:42

## 2023-06-28 RX ADMIN — SODIUM CHLORIDE, PRESERVATIVE FREE 10 ML: 5 INJECTION INTRAVENOUS at 11:45

## 2023-06-28 RX ADMIN — ONDANSETRON 8 MG: 2 INJECTION INTRAMUSCULAR; INTRAVENOUS at 11:29

## 2023-06-28 RX ADMIN — KETOROLAC TROMETHAMINE 30 MG: 30 INJECTION, SOLUTION INTRAMUSCULAR; INTRAVENOUS at 13:28

## 2023-06-28 ASSESSMENT — PAIN DESCRIPTION - LOCATION: LOCATION: HEAD

## 2023-06-28 ASSESSMENT — PAIN SCALES - GENERAL
PAINLEVEL_OUTOF10: 6
PAINLEVEL_OUTOF10: 4
PAINLEVEL_OUTOF10: 8
PAINLEVEL_OUTOF10: 5

## 2023-06-29 ENCOUNTER — HOSPITAL ENCOUNTER (OUTPATIENT)
Dept: INFUSION THERAPY | Age: 58
Setting detail: INFUSION SERIES
Discharge: HOME | End: 2023-06-29
Payer: COMMERCIAL

## 2023-06-29 VITALS
RESPIRATION RATE: 18 BRPM | OXYGEN SATURATION: 98 % | SYSTOLIC BLOOD PRESSURE: 132 MMHG | TEMPERATURE: 98 F | HEART RATE: 80 BPM | DIASTOLIC BLOOD PRESSURE: 72 MMHG

## 2023-06-29 DIAGNOSIS — G43.709 CHRONIC MIGRAINE WITHOUT AURA WITHOUT STATUS MIGRAINOSUS, NOT INTRACTABLE: Primary | ICD-10-CM

## 2023-06-29 PROCEDURE — 2500000003 HC RX 250 WO HCPCS: Performed by: PHYSICAL MEDICINE & REHABILITATION

## 2023-06-29 PROCEDURE — 96367 TX/PROPH/DG ADDL SEQ IV INF: CPT

## 2023-06-29 PROCEDURE — 96365 THER/PROPH/DIAG IV INF INIT: CPT

## 2023-06-29 PROCEDURE — 6360000002 HC RX W HCPCS: Performed by: PHYSICAL MEDICINE & REHABILITATION

## 2023-06-29 PROCEDURE — 96375 TX/PRO/DX INJ NEW DRUG ADDON: CPT

## 2023-06-29 PROCEDURE — 2580000003 HC RX 258: Performed by: PHYSICAL MEDICINE & REHABILITATION

## 2023-06-29 RX ORDER — MAGNESIUM SULFATE 1 G/100ML
1000 INJECTION INTRAVENOUS ONCE
Status: CANCELLED
Start: 2023-06-30 | End: 2023-06-30

## 2023-06-29 RX ORDER — DEXAMETHASONE SODIUM PHOSPHATE 4 MG/ML
4 INJECTION, SOLUTION INTRA-ARTICULAR; INTRALESIONAL; INTRAMUSCULAR; INTRAVENOUS; SOFT TISSUE ONCE
Status: CANCELLED
Start: 2023-06-30 | End: 2023-06-30

## 2023-06-29 RX ORDER — KETOROLAC TROMETHAMINE 30 MG/ML
30 INJECTION, SOLUTION INTRAMUSCULAR; INTRAVENOUS ONCE
Status: CANCELLED
Start: 2023-06-30 | End: 2023-06-30

## 2023-06-29 RX ORDER — HEPARIN SODIUM (PORCINE) LOCK FLUSH IV SOLN 100 UNIT/ML 100 UNIT/ML
500 SOLUTION INTRAVENOUS PRN
Status: DISCONTINUED | OUTPATIENT
Start: 2023-06-29 | End: 2023-06-30 | Stop reason: HOSPADM

## 2023-06-29 RX ORDER — DIHYDROERGOTAMINE MESYLATE 1 MG/ML
1 INJECTION, SOLUTION INTRAMUSCULAR; INTRAVENOUS; SUBCUTANEOUS ONCE
Status: CANCELLED
Start: 2023-06-30 | End: 2023-06-30

## 2023-06-29 RX ORDER — MAGNESIUM SULFATE 1 G/100ML
1000 INJECTION INTRAVENOUS ONCE
Status: COMPLETED | OUTPATIENT
Start: 2023-06-29 | End: 2023-06-29

## 2023-06-29 RX ORDER — ONDANSETRON 2 MG/ML
8 INJECTION INTRAMUSCULAR; INTRAVENOUS ONCE
Status: CANCELLED
Start: 2023-06-30 | End: 2023-06-30

## 2023-06-29 RX ORDER — DIHYDROERGOTAMINE MESYLATE 1 MG/ML
1 INJECTION, SOLUTION INTRAMUSCULAR; INTRAVENOUS; SUBCUTANEOUS ONCE
Status: COMPLETED | OUTPATIENT
Start: 2023-06-29 | End: 2023-06-29

## 2023-06-29 RX ORDER — SODIUM CHLORIDE 0.9 % (FLUSH) 0.9 %
5-40 SYRINGE (ML) INJECTION PRN
Status: DISCONTINUED | OUTPATIENT
Start: 2023-06-29 | End: 2023-06-30 | Stop reason: HOSPADM

## 2023-06-29 RX ORDER — HEPARIN SODIUM (PORCINE) LOCK FLUSH IV SOLN 100 UNIT/ML 100 UNIT/ML
500 SOLUTION INTRAVENOUS PRN
Status: CANCELLED | OUTPATIENT
Start: 2023-06-30

## 2023-06-29 RX ORDER — SODIUM CHLORIDE 9 MG/ML
5-250 INJECTION, SOLUTION INTRAVENOUS PRN
Status: CANCELLED | OUTPATIENT
Start: 2023-06-30

## 2023-06-29 RX ORDER — SODIUM CHLORIDE 0.9 % (FLUSH) 0.9 %
5-40 SYRINGE (ML) INJECTION PRN
Status: CANCELLED | OUTPATIENT
Start: 2023-06-30

## 2023-06-29 RX ORDER — DEXAMETHASONE SODIUM PHOSPHATE 4 MG/ML
4 INJECTION, SOLUTION INTRA-ARTICULAR; INTRALESIONAL; INTRAMUSCULAR; INTRAVENOUS; SOFT TISSUE ONCE
Status: COMPLETED | OUTPATIENT
Start: 2023-06-29 | End: 2023-06-29

## 2023-06-29 RX ORDER — ONDANSETRON 2 MG/ML
8 INJECTION INTRAMUSCULAR; INTRAVENOUS ONCE
Status: COMPLETED | OUTPATIENT
Start: 2023-06-29 | End: 2023-06-29

## 2023-06-29 RX ORDER — DIPHENHYDRAMINE HYDROCHLORIDE 50 MG/ML
50 INJECTION INTRAMUSCULAR; INTRAVENOUS ONCE
Status: COMPLETED | OUTPATIENT
Start: 2023-06-29 | End: 2023-06-29

## 2023-06-29 RX ORDER — KETOROLAC TROMETHAMINE 30 MG/ML
30 INJECTION, SOLUTION INTRAMUSCULAR; INTRAVENOUS ONCE
Status: COMPLETED | OUTPATIENT
Start: 2023-06-29 | End: 2023-06-29

## 2023-06-29 RX ORDER — DIPHENHYDRAMINE HYDROCHLORIDE 50 MG/ML
50 INJECTION INTRAMUSCULAR; INTRAVENOUS ONCE
Status: CANCELLED
Start: 2023-06-30 | End: 2023-06-30

## 2023-06-29 RX ADMIN — SODIUM CHLORIDE, PRESERVATIVE FREE 10 ML: 5 INJECTION INTRAVENOUS at 12:58

## 2023-06-29 RX ADMIN — DIPHENHYDRAMINE HYDROCHLORIDE 50 MG: 50 INJECTION, SOLUTION INTRAMUSCULAR; INTRAVENOUS at 11:29

## 2023-06-29 RX ADMIN — DEXAMETHASONE SODIUM PHOSPHATE 4 MG: 4 INJECTION, SOLUTION INTRAMUSCULAR; INTRAVENOUS at 11:42

## 2023-06-29 RX ADMIN — KETOROLAC TROMETHAMINE 30 MG: 30 INJECTION, SOLUTION INTRAMUSCULAR; INTRAVENOUS at 12:58

## 2023-06-29 RX ADMIN — SODIUM CHLORIDE, PRESERVATIVE FREE 10 ML: 5 INJECTION INTRAVENOUS at 11:21

## 2023-06-29 RX ADMIN — SODIUM CHLORIDE, PRESERVATIVE FREE 10 ML: 5 INJECTION INTRAVENOUS at 11:46

## 2023-06-29 RX ADMIN — HEPARIN SODIUM (PORCINE) LOCK FLUSH IV SOLN 100 UNIT/ML 500 UNITS: 100 SOLUTION at 14:07

## 2023-06-29 RX ADMIN — DIHYDROERGOTAMINE MESYLATE 1 MG: 1 INJECTION INTRAMUSCULAR; INTRAVENOUS; SUBCUTANEOUS at 11:46

## 2023-06-29 RX ADMIN — SODIUM CHLORIDE, PRESERVATIVE FREE 10 ML: 5 INJECTION INTRAVENOUS at 11:42

## 2023-06-29 RX ADMIN — SODIUM CHLORIDE, PRESERVATIVE FREE 10 ML: 5 INJECTION INTRAVENOUS at 11:54

## 2023-06-29 RX ADMIN — ONDANSETRON 8 MG: 2 INJECTION INTRAMUSCULAR; INTRAVENOUS at 11:31

## 2023-06-29 RX ADMIN — SODIUM CHLORIDE, PRESERVATIVE FREE 10 ML: 5 INJECTION INTRAVENOUS at 14:03

## 2023-06-29 RX ADMIN — MAGNESIUM SULFATE HEPTAHYDRATE 1000 MG: 1 INJECTION, SOLUTION INTRAVENOUS at 11:54

## 2023-06-29 RX ADMIN — VALPROATE SODIUM 500 MG: 100 INJECTION, SOLUTION INTRAVENOUS at 13:02

## 2023-06-29 RX ADMIN — SODIUM CHLORIDE, PRESERVATIVE FREE 10 ML: 5 INJECTION INTRAVENOUS at 13:01

## 2023-06-29 RX ADMIN — SODIUM CHLORIDE, PRESERVATIVE FREE 10 ML: 5 INJECTION INTRAVENOUS at 11:31

## 2023-06-29 ASSESSMENT — PAIN DESCRIPTION - LOCATION: LOCATION: HEAD

## 2023-06-29 ASSESSMENT — PAIN SCALES - GENERAL
PAINLEVEL_OUTOF10: 2
PAINLEVEL_OUTOF10: 5
PAINLEVEL_OUTOF10: 5

## 2023-06-30 ENCOUNTER — HOSPITAL ENCOUNTER (OUTPATIENT)
Dept: INFUSION THERAPY | Age: 58
Setting detail: INFUSION SERIES
Discharge: HOME | End: 2023-06-30
Payer: COMMERCIAL

## 2023-06-30 VITALS
DIASTOLIC BLOOD PRESSURE: 60 MMHG | OXYGEN SATURATION: 99 % | TEMPERATURE: 98 F | HEART RATE: 102 BPM | SYSTOLIC BLOOD PRESSURE: 123 MMHG | RESPIRATION RATE: 20 BRPM

## 2023-06-30 DIAGNOSIS — G43.709 CHRONIC MIGRAINE WITHOUT AURA WITHOUT STATUS MIGRAINOSUS, NOT INTRACTABLE: Primary | ICD-10-CM

## 2023-06-30 PROCEDURE — 96365 THER/PROPH/DIAG IV INF INIT: CPT

## 2023-06-30 PROCEDURE — 2580000003 HC RX 258: Performed by: PHYSICAL MEDICINE & REHABILITATION

## 2023-06-30 PROCEDURE — 2500000003 HC RX 250 WO HCPCS: Performed by: PHYSICAL MEDICINE & REHABILITATION

## 2023-06-30 PROCEDURE — 96375 TX/PRO/DX INJ NEW DRUG ADDON: CPT

## 2023-06-30 PROCEDURE — 6360000002 HC RX W HCPCS: Performed by: PHYSICAL MEDICINE & REHABILITATION

## 2023-06-30 PROCEDURE — 96367 TX/PROPH/DG ADDL SEQ IV INF: CPT

## 2023-06-30 RX ORDER — DEXAMETHASONE SODIUM PHOSPHATE 4 MG/ML
4 INJECTION, SOLUTION INTRA-ARTICULAR; INTRALESIONAL; INTRAMUSCULAR; INTRAVENOUS; SOFT TISSUE ONCE
Status: CANCELLED
Start: 2023-06-30 | End: 2023-06-30

## 2023-06-30 RX ORDER — ONDANSETRON 2 MG/ML
8 INJECTION INTRAMUSCULAR; INTRAVENOUS ONCE
Status: COMPLETED | OUTPATIENT
Start: 2023-06-30 | End: 2023-06-30

## 2023-06-30 RX ORDER — MAGNESIUM SULFATE 1 G/100ML
1000 INJECTION INTRAVENOUS ONCE
Status: CANCELLED
Start: 2023-06-30 | End: 2023-06-30

## 2023-06-30 RX ORDER — HEPARIN SODIUM (PORCINE) LOCK FLUSH IV SOLN 100 UNIT/ML 100 UNIT/ML
500 SOLUTION INTRAVENOUS PRN
Status: CANCELLED | OUTPATIENT
Start: 2023-06-30

## 2023-06-30 RX ORDER — DIHYDROERGOTAMINE MESYLATE 1 MG/ML
1 INJECTION, SOLUTION INTRAMUSCULAR; INTRAVENOUS; SUBCUTANEOUS ONCE
Status: CANCELLED
Start: 2023-06-30 | End: 2023-06-30

## 2023-06-30 RX ORDER — SODIUM CHLORIDE 0.9 % (FLUSH) 0.9 %
5-40 SYRINGE (ML) INJECTION PRN
Status: CANCELLED | OUTPATIENT
Start: 2023-06-30

## 2023-06-30 RX ORDER — DEXAMETHASONE SODIUM PHOSPHATE 10 MG/ML
4 INJECTION INTRAMUSCULAR; INTRAVENOUS ONCE
Status: COMPLETED | OUTPATIENT
Start: 2023-06-30 | End: 2023-06-30

## 2023-06-30 RX ORDER — KETOROLAC TROMETHAMINE 30 MG/ML
30 INJECTION, SOLUTION INTRAMUSCULAR; INTRAVENOUS ONCE
Status: COMPLETED | OUTPATIENT
Start: 2023-06-30 | End: 2023-06-30

## 2023-06-30 RX ORDER — MAGNESIUM SULFATE 1 G/100ML
1000 INJECTION INTRAVENOUS ONCE
Status: COMPLETED | OUTPATIENT
Start: 2023-06-30 | End: 2023-06-30

## 2023-06-30 RX ORDER — SODIUM CHLORIDE 0.9 % (FLUSH) 0.9 %
5-40 SYRINGE (ML) INJECTION PRN
Status: DISCONTINUED | OUTPATIENT
Start: 2023-06-30 | End: 2023-07-01 | Stop reason: HOSPADM

## 2023-06-30 RX ORDER — DIHYDROERGOTAMINE MESYLATE 1 MG/ML
1 INJECTION, SOLUTION INTRAMUSCULAR; INTRAVENOUS; SUBCUTANEOUS ONCE
Status: COMPLETED | OUTPATIENT
Start: 2023-06-30 | End: 2023-06-30

## 2023-06-30 RX ORDER — SODIUM CHLORIDE 9 MG/ML
5-250 INJECTION, SOLUTION INTRAVENOUS PRN
Status: CANCELLED | OUTPATIENT
Start: 2023-06-30

## 2023-06-30 RX ORDER — ONDANSETRON 2 MG/ML
8 INJECTION INTRAMUSCULAR; INTRAVENOUS ONCE
Status: CANCELLED
Start: 2023-06-30 | End: 2023-06-30

## 2023-06-30 RX ORDER — DIPHENHYDRAMINE HYDROCHLORIDE 50 MG/ML
50 INJECTION INTRAMUSCULAR; INTRAVENOUS ONCE
Status: COMPLETED | OUTPATIENT
Start: 2023-06-30 | End: 2023-06-30

## 2023-06-30 RX ORDER — DIPHENHYDRAMINE HYDROCHLORIDE 50 MG/ML
50 INJECTION INTRAMUSCULAR; INTRAVENOUS ONCE
Status: CANCELLED
Start: 2023-06-30 | End: 2023-06-30

## 2023-06-30 RX ORDER — KETOROLAC TROMETHAMINE 30 MG/ML
30 INJECTION, SOLUTION INTRAMUSCULAR; INTRAVENOUS ONCE
Status: CANCELLED
Start: 2023-06-30 | End: 2023-06-30

## 2023-06-30 RX ADMIN — MAGNESIUM SULFATE HEPTAHYDRATE 1000 MG: 1 INJECTION, SOLUTION INTRAVENOUS at 11:53

## 2023-06-30 RX ADMIN — VALPROATE SODIUM 500 MG: 100 INJECTION, SOLUTION INTRAVENOUS at 13:09

## 2023-06-30 RX ADMIN — Medication 10 ML: at 11:42

## 2023-06-30 RX ADMIN — DIPHENHYDRAMINE HYDROCHLORIDE 50 MG: 50 INJECTION, SOLUTION INTRAMUSCULAR; INTRAVENOUS at 11:26

## 2023-06-30 RX ADMIN — DEXAMETHASONE SODIUM PHOSPHATE 4 MG: 10 INJECTION INTRAMUSCULAR; INTRAVENOUS at 11:34

## 2023-06-30 RX ADMIN — Medication 10 ML: at 11:40

## 2023-06-30 RX ADMIN — Medication 10 ML: at 11:45

## 2023-06-30 RX ADMIN — ONDANSETRON 8 MG: 2 INJECTION INTRAMUSCULAR; INTRAVENOUS at 11:26

## 2023-06-30 RX ADMIN — DIHYDROERGOTAMINE MESYLATE 1 MG: 1 INJECTION INTRAMUSCULAR; INTRAVENOUS; SUBCUTANEOUS at 11:34

## 2023-06-30 RX ADMIN — Medication 10 ML: at 14:11

## 2023-06-30 RX ADMIN — KETOROLAC TROMETHAMINE 30 MG: 30 INJECTION, SOLUTION INTRAMUSCULAR; INTRAVENOUS at 13:06

## 2023-06-30 RX ADMIN — Medication 10 ML: at 11:56

## 2023-06-30 RX ADMIN — Medication 10 ML: at 11:41

## 2023-06-30 ASSESSMENT — PAIN SCALES - GENERAL
PAINLEVEL_OUTOF10: 2
PAINLEVEL_OUTOF10: 4

## 2023-06-30 ASSESSMENT — PAIN DESCRIPTION - LOCATION: LOCATION: HEAD

## 2023-07-19 ENCOUNTER — OFFICE VISIT (OUTPATIENT)
Dept: PHYSICAL MEDICINE AND REHAB | Age: 58
End: 2023-07-19

## 2023-07-19 VITALS
HEART RATE: 89 BPM | HEIGHT: 62 IN | SYSTOLIC BLOOD PRESSURE: 125 MMHG | OXYGEN SATURATION: 98 % | BODY MASS INDEX: 20.06 KG/M2 | RESPIRATION RATE: 16 BRPM | WEIGHT: 109 LBS | DIASTOLIC BLOOD PRESSURE: 82 MMHG | TEMPERATURE: 97.2 F

## 2023-07-19 DIAGNOSIS — M79.10 TRIGGER POINT: Primary | ICD-10-CM

## 2023-07-19 DIAGNOSIS — M54.81 BILATERAL OCCIPITAL NEURALGIA: ICD-10-CM

## 2023-07-19 RX ORDER — LIDOCAINE HYDROCHLORIDE 10 MG/ML
8 INJECTION, SOLUTION INFILTRATION; PERINEURAL ONCE
Status: COMPLETED | OUTPATIENT
Start: 2023-07-19 | End: 2023-07-19

## 2023-07-19 RX ORDER — BUPIVACAINE HYDROCHLORIDE 2.5 MG/ML
4 INJECTION, SOLUTION INFILTRATION; PERINEURAL ONCE
Status: COMPLETED | OUTPATIENT
Start: 2023-07-19 | End: 2023-07-19

## 2023-07-19 RX ADMIN — LIDOCAINE HYDROCHLORIDE 8 ML: 10 INJECTION, SOLUTION INFILTRATION; PERINEURAL at 13:42

## 2023-07-19 RX ADMIN — BUPIVACAINE HYDROCHLORIDE 10 MG: 2.5 INJECTION, SOLUTION INFILTRATION; PERINEURAL at 13:40

## 2023-07-19 NOTE — PROGRESS NOTES
Nacho Nickerson D.O. [place] [facility]  [address] [facility]. [address], [address] [place] [address]  Phone: [phone]  Fax: [phone]    7/19/2023    Chief Complaint   Patient presents with    Migraine     Injection-trigger point       Last injection: 6/7/23  Taking anticoagulants/antiplatelets: No  Diabetic: No  Febrile/active infection: No    Ambulatory Procedure Time Out  Correct Patient: Yes  Correct Procedure: Yes  Correct Site/Side: Yes  Correct Site(s) Marked: Yes  Informed Consent Signed: Yes  Allergies Verified: Yes  Staff Present & Credential[de-identified] Gillian Spencer LPN/Dr Jeffrey Burgos    After explaining the indications, risks, benefits and alternatives of a Bilateral trapezius and right rhomboid trigger points X3, the patient agreed to proceed. A permit was signed and scanned into the chart. The patient was placed in the seated position. The skin over the trigger point was prepared with alcohol. Using aseptic no touch technique, a 22 gauge, 1 1/2\" needle with 2 cc of Xylocaine 1% was directed into the trigger point. After negative aspiration, 2cc of  the medication was injected in a fanning out method at each of 3 trigger points. Adequate hemostasis was achieved and a bandage applied. The patient tolerated the procedure well and was educated in post injection care. The patient was clinically monitored after the injection and left the office without incident. There was post-injection reduction in pain and improved range of motion. Nacho Nickerson D.O., P.T.   Board Certified Physical Medicine and Rehabilitation  Board Certified Electrodiagnostic Medicine    Administrations This Visit       bupivacaine (MARCAINE) 0.25 % injection 10 mg       Admin Date  07/19/2023  13:40 Action  Given Dose  10 mg Route  IntraDERmal Site  Other Administered By  Anel Mcadams RN    Ordering Provider: Logan Holder DO    [address]: 7600-1495-38    Lot#: [id number]    : [facility]    Patient

## 2023-08-10 RX ORDER — ZOLMITRIPTAN 5 MG/1
TABLET, FILM COATED ORAL
Qty: 6 TABLET | Refills: 5 | Status: SHIPPED | OUTPATIENT
Start: 2023-08-10

## 2023-08-10 RX ORDER — TOPIRAMATE 50 MG/1
TABLET, FILM COATED ORAL
Qty: 180 TABLET | Refills: 3 | OUTPATIENT
Start: 2023-08-10

## 2023-08-10 NOTE — TELEPHONE ENCOUNTER
Patient called and stated that she need a refill on Zomig 5 mg 1 tab as needed for migraines #6 5 refills sent 5-31-23 . Patient stated that she has had to use it more and has no more refills. Please advise.

## 2023-08-10 NOTE — TELEPHONE ENCOUNTER
[facility] Subjective: No acute events overnight No acute distress No acute complaints Very weak, debilitated Ct guided bx + for lung cancer, squamous cell carcinoma Current Facility-Administered Medications Medication Dose Route Frequency  insulin lispro (HUMALOG) injection   SubCUTAneous AC&HS  
 glucose chewable tablet 16 g  4 Tab Oral PRN  
 dextrose (D50W) injection syrg 12.5-25 g  12.5-25 g IntraVENous PRN  
 glucagon (GLUCAGEN) injection 1 mg  1 mg IntraMUSCular PRN  
 scopolamine (TRANSDERM-SCOP) 1 mg over 3 days 1 Patch  1 Patch TransDERmal Q72H  
 HYDROcodone-acetaminophen (NORCO) 5-325 mg per tablet 2 Tab  2 Tab Oral Q4H PRN  predniSONE (DELTASONE) tablet 40 mg  40 mg Oral DAILY WITH BREAKFAST  ALPRAZolam (XANAX) tablet 0.5 mg  0.5 mg Oral QID PRN  prochlorperazine (COMPAZINE) with saline injection 10 mg  10 mg IntraVENous Q6H PRN  
 amoxicillin-clavulanate (AUGMENTIN) 875-125 mg per tablet 1 Tab  1 Tab Oral Q12H  
 zinc oxide-cod liver oil (DESITIN) 40 % paste   Topical BID and QHS  methIMAzole (TAPAZOLE) tablet 10 mg  10 mg Oral DAILY  bacitracin 500 unit/gram packet 1 Packet  1 Packet Topical DAILY  gabapentin (NEURONTIN) capsule 300 mg  300 mg Oral TID  arformoterol 15 mcg/budesonide 0.5 mg neb solution   Nebulization BID RT  
 zinc oxide-cod liver oil (DESITIN) 40 % paste   Topical PRN  
 morphine injection 2 mg  2 mg IntraVENous Q6H PRN  
 0.9% sodium chloride infusion  75 mL/hr IntraVENous CONTINUOUS  
 levalbuterol (XOPENEX) nebulizer soln 1.25 mg/3 mL  1.25 mg Nebulization Q6H RT  
 ipratropium (ATROVENT) 0.02 % nebulizer solution 0.5 mg  0.5 mg Nebulization Q6H RT  
 labetaloL (NORMODYNE;TRANDATE) injection 10 mg  10 mg IntraVENous Q2H PRN  
 metoprolol tartrate (LOPRESSOR) tablet 50 mg  50 mg Oral TID  sodium chloride (NS) flush 5-40 mL  5-40 mL IntraVENous Q8H  
 Request approved. Please notify patient. Thank you.  sodium chloride (NS) flush 5-40 mL  5-40 mL IntraVENous PRN  
 acetaminophen (TYLENOL) tablet 650 mg  650 mg Oral Q4H PRN  
 naloxone (NARCAN) injection 0.4 mg  0.4 mg IntraVENous PRN  
 amLODIPine (NORVASC) tablet 10 mg  10 mg Oral DAILY  DULoxetine (CYMBALTA) capsule 40 mg  40 mg Oral DAILY  levETIRAcetam (KEPPRA) tablet 500 mg  500 mg Oral BID  enoxaparin (LOVENOX) injection 40 mg  40 mg SubCUTAneous Q24H  
 methocarbamoL (ROBAXIN) tablet 750-1,500 mg  750-1,500 mg Oral BID  ondansetron (ZOFRAN) injection 4 mg  4 mg IntraVENous Q4H PRN Review of Systems: 
Constitutional HEENT Cardiovascular Pulmonary Gastrointestinal Genitourinary Musculoskeletal Integument Neurologic Endocrinologic Hematologic ROS unremarkable except for  HPI Objective:  
Vital Signs:   
Visit Vitals /72 (BP 1 Location: Right arm, BP Patient Position: At rest) Pulse (!) 108 Temp 98.6 °F (37 °C) Resp 18 Ht 5' 2\" (1.575 m) Wt 77.1 kg (169 lb 15.6 oz) SpO2 90% BMI 31.09 kg/m² O2 Device: Nasal cannula O2 Flow Rate (L/min): 6 l/min Temp (24hrs), Av.8 °F (36.6 °C), Min:97.4 °F (36.3 °C), Max:98.6 °F (37 °C) Intake/Output:  
Last shift:      No intake/output data recorded. Last 3 shifts:  1901 -  0700 In: 1140 [P.O.:240; I.V.:900] Out: 1900 [VIFX] Intake/Output Summary (Last 24 hours) at 2020 9028 Last data filed at 2020 9745 Gross per 24 hour Intake 1140 ml Output 1000 ml Net 140 ml Physical Exam:  
General:  Alert, cooperative, no distress, . Head:  Normocephalic, atraumatic without obvious abnormality Eyes:  Sclera non injected Conjunctivae. PERRL, EOMs intact. Nose: Nares normal. Septum midline. Mucosa normal. No drainage or sinus tenderness. Mouth: Moist mucus membranes poor Dentition Neck: Supple, symmetrical, trachea midline, no adenopathy,   No stridor Back:   Without CVA or spinal tenderness Lungs: No acessory muscle use wheeze rhonchi rales- decreased BS Heart:  Regular rate and rhythm, S1, S2 normal, no murmur, click, rub or gallop. Abdomen:   Soft, non-tender. Bowel sounds normal. No masses, tenderness, guarding rebound No organomegaly. Extremities: no cyanosis  edema clubbing Skin: Warm dry. No rashes or lesions Lymph nodes: Cervical, supraclavicular, and axillary nodes normal.  
Neurologic: Alert /Oriented no gross motor deficits Data Review Recent Results (from the past 24 hour(s)) GLUCOSE, POC Collection Time: 11/12/20 11:34 AM  
Result Value Ref Range Glucose (POC) 124 (H) 65 - 100 mg/dL Performed by Scarlet Joseph GLUCOSE, POC Collection Time: 11/12/20  4:28 PM  
Result Value Ref Range Glucose (POC) 151 (H) 65 - 100 mg/dL Performed by Adrienne Workman (PCT) GLUCOSE, POC Collection Time: 11/12/20  9:06 PM  
Result Value Ref Range Glucose (POC) 129 (H) 65 - 100 mg/dL Performed by Lara Torres RN   
HEMOGLOBIN A1C WITH EAG Collection Time: 11/13/20  1:17 AM  
Result Value Ref Range Hemoglobin A1c 5.8 (H) 4.0 - 5.6 % Est. average glucose 120 mg/dL GLUCOSE, POC Collection Time: 11/13/20  7:53 AM  
Result Value Ref Range Glucose (POC) 107 (H) 65 - 100 mg/dL Performed by Ida Leos (PCT) Chest X-Ray:and CT reports and images noted Assessment:  
 
 
· Bilateral pulmonary mass/nodules GAVIOTA/RUL/LLL · Lung cancer confirmed by bx · Subcarinal LA 
· O2 dependent · COPD with exacerbation · 50 pk year tobacco use-(minimizes hx/habit) Recommendations:  
 
1. O2 
2. PET scan as out pt 3. Smoking cessation 4. Bronchodilators and steroids for COPD exacerbation 5.  PO abx 5. DVT ppx 6. ECHO done, no obvious evidence of endocarditis 7. Increased dose of po pain medications 8. Xanax for anxiety 9. Dr Yunier Ragland following 10. Needs palliative care consult 11. Will f/u monday Lori Mays MD

## 2023-08-25 ENCOUNTER — OFFICE VISIT (OUTPATIENT)
Dept: PHYSICAL MEDICINE AND REHAB | Age: 58
End: 2023-08-25

## 2023-08-25 VITALS
TEMPERATURE: 97.2 F | BODY MASS INDEX: 20.98 KG/M2 | HEIGHT: 62 IN | SYSTOLIC BLOOD PRESSURE: 124 MMHG | HEART RATE: 90 BPM | DIASTOLIC BLOOD PRESSURE: 82 MMHG | WEIGHT: 114 LBS

## 2023-08-25 DIAGNOSIS — G43.709 CHRONIC MIGRAINE WITHOUT AURA WITHOUT STATUS MIGRAINOSUS, NOT INTRACTABLE: ICD-10-CM

## 2023-08-25 DIAGNOSIS — M79.10 TRIGGER POINT: ICD-10-CM

## 2023-08-25 RX ORDER — PROMETHAZINE HYDROCHLORIDE 25 MG/1
25 TABLET ORAL 4 TIMES DAILY PRN
Qty: 60 TABLET | Refills: 5 | Status: SHIPPED | OUTPATIENT
Start: 2023-08-25 | End: 2023-11-23

## 2023-08-25 RX ORDER — BUTALBITAL, ACETAMINOPHEN AND CAFFEINE 300; 40; 50 MG/1; MG/1; MG/1
1 CAPSULE ORAL 2 TIMES DAILY PRN
Qty: 60 CAPSULE | Refills: 2 | Status: SHIPPED | OUTPATIENT
Start: 2023-08-25

## 2023-08-25 RX ORDER — LIDOCAINE HYDROCHLORIDE 10 MG/ML
4 INJECTION, SOLUTION INFILTRATION; PERINEURAL ONCE
Status: COMPLETED | OUTPATIENT
Start: 2023-08-25 | End: 2023-08-28

## 2023-08-25 NOTE — PROGRESS NOTES
Josepha Severe, D.O. [facility]  [address] [facility]. [facility] [address], [address] [place] [address]  Phone: [phone]  Fax: [phone]    8/31/2023    Chief Complaint   Patient presents with    Migraine     Trigger point injection       Last injection: 7/19/23  Taking anticoagulants/antiplatelets: No  Diabetic: No  Febrile/active infection: No    Ambulatory Procedure Time Out  Correct Patient: Yes  Correct Procedure: Yes  Correct Site/Side: Yes  Correct Site(s) Marked: Yes  Informed Consent Signed: Yes  Allergies Verified: Yes  Staff Present & Credential[de-identified] Lisa Liu LPN/ Dr. Josepha Severe    After explaining the indications, risks, benefits and alternatives of a right trapezius and right rhomboid trigger points X2, the patient agreed to proceed. A permit was signed and scanned into the chart. The patient was placed in the seated position. The skin over the trigger point was prepared with alcohol. Using aseptic no touch technique, a 22 gauge, 1 1/2\" needle with 4 cc of Xylocaine 1% was directed into the trigger point. After negative aspiration, 2cc of  the medication was injected in a fanning out method at each of 2 trigger points. Adequate hemostasis was achieved and a bandage applied. The patient tolerated the procedure well and was educated in post injection care. The patient was clinically monitored after the injection and left the office without incident. There was post-injection reduction in pain and improved range of motion. Josepha Severe, D.O., P.T.   Board Certified Physical Medicine and Rehabilitation  Board Certified Electrodiagnostic Medicine    Administrations This Visit       lidocaine 1 % injection 4 mL       Admin Date  08/28/2023  08:49 Action  Given Dose  4 mL Route  Other Site   Administered By  Ness Grubbs RN    Ordering Provider: Hillary Giron DO    NDC: 0374-4670-85    Lot#: [id number]    : [place]    Patient Supplied?: No

## 2023-08-28 RX ADMIN — LIDOCAINE HYDROCHLORIDE 4 ML: 10 INJECTION, SOLUTION INFILTRATION; PERINEURAL at 08:49

## 2023-09-06 ENCOUNTER — NURSE ONLY (OUTPATIENT)
Dept: PHYSICAL MEDICINE AND REHAB | Age: 58
End: 2023-09-06

## 2023-09-06 DIAGNOSIS — G43.709 CHRONIC MIGRAINE WITHOUT AURA WITHOUT STATUS MIGRAINOSUS, NOT INTRACTABLE: Primary | ICD-10-CM

## 2023-09-06 RX ORDER — KETOROLAC TROMETHAMINE 15 MG/ML
15 INJECTION, SOLUTION INTRAMUSCULAR; INTRAVENOUS ONCE
Status: COMPLETED | OUTPATIENT
Start: 2023-09-06 | End: 2023-09-07

## 2023-09-06 RX ORDER — ORPHENADRINE CITRATE 30 MG/ML
30 INJECTION INTRAMUSCULAR; INTRAVENOUS ONCE
Status: COMPLETED | OUTPATIENT
Start: 2023-09-06 | End: 2023-09-07

## 2023-09-07 RX ADMIN — KETOROLAC TROMETHAMINE 15 MG: 15 INJECTION, SOLUTION INTRAMUSCULAR; INTRAVENOUS at 08:12

## 2023-09-07 RX ADMIN — ORPHENADRINE CITRATE 30 MG: 30 INJECTION INTRAMUSCULAR; INTRAVENOUS at 08:13

## 2023-09-11 ENCOUNTER — HOSPITAL ENCOUNTER (OUTPATIENT)
Dept: CT IMAGING | Age: 58
Discharge: HOME | End: 2023-09-11
Payer: COMMERCIAL

## 2023-09-11 DIAGNOSIS — M17.11 ARTHRITIS OF KNEE, RIGHT: ICD-10-CM

## 2023-09-11 PROCEDURE — 73700 CT LOWER EXTREMITY W/O DYE: CPT

## 2023-09-18 ENCOUNTER — OFFICE VISIT (OUTPATIENT)
Dept: PHYSICAL MEDICINE AND REHAB | Age: 58
End: 2023-09-18

## 2023-09-18 VITALS
WEIGHT: 114 LBS | DIASTOLIC BLOOD PRESSURE: 70 MMHG | TEMPERATURE: 97.1 F | SYSTOLIC BLOOD PRESSURE: 118 MMHG | BODY MASS INDEX: 20.98 KG/M2 | HEIGHT: 62 IN | HEART RATE: 78 BPM

## 2023-09-18 DIAGNOSIS — G43.709 CHRONIC MIGRAINE WITHOUT AURA WITHOUT STATUS MIGRAINOSUS, NOT INTRACTABLE: Primary | ICD-10-CM

## 2023-09-18 RX ORDER — PREDNISONE 5 MG/1
TABLET ORAL
COMMUNITY
Start: 2023-08-15

## 2023-09-18 RX ORDER — HYDROCHLOROTHIAZIDE 12.5 MG/1
12.5 TABLET ORAL DAILY
COMMUNITY
Start: 2023-08-25

## 2023-09-18 RX ORDER — BIOTIN 5 MG
5 TABLET ORAL DAILY
COMMUNITY
Start: 2022-03-29

## 2023-09-18 NOTE — PROGRESS NOTES
skin was prepared with Betadine. Using a no touch technique, the injections were carried out at the following sites: bilateral Frontalis 20 units at 4 sites, bilateral Temporalis 40 units divided at 8 sites, bilateral Occipitalis 30 units divided at 6 sites, bilateral cervical paraspinals 20 units divided at 4 sites, and bilateral trapezius 30 units divided at 6 sites. The total was 140 units and as a result there was 60 units of unavoidable waste. Adequate hemostasis was obtained. Ice was applied for 20 minutes post injection. The patient tolerated the procedure well. There were no complications. The patient was educated in post-procedure care including ice 20 minutes on/20 minutes off prn pain/bruising/swelling, advised to avoid hats and rubbing the forehead for 1-2 days and to call if any fevers chills, night sweats, drainage, increased pain, weakness, difficulty breathing or swallowing. Patient advised to expect improvements in about 2 weeks. follow up 6 weeks      Bo Gill D.O., P.T.   Board Certified Physical Medicine and Rehabilitation  Board Certified Electrodiagnostic Medicine    Administrations This Visit       Onabotulinumtoxin A (BOTOX) injection 200 Units       Admin Date  09/19/2023  07:56 Action  Given Dose  200 Units Route  IntraMUSCular Site  Other Administered By  Dion Miranda RN    Ordering Provider: Winsome Ba DO    NDC: 6248-5064-47    Lot#: [id number]    : [place]    Patient Supplied?: No

## 2023-10-03 ENCOUNTER — OFFICE VISIT (OUTPATIENT)
Dept: PHYSICAL MEDICINE AND REHAB | Age: 58
End: 2023-10-03

## 2023-10-03 VITALS
TEMPERATURE: 97.2 F | HEIGHT: 62 IN | DIASTOLIC BLOOD PRESSURE: 78 MMHG | BODY MASS INDEX: 20.8 KG/M2 | SYSTOLIC BLOOD PRESSURE: 132 MMHG | WEIGHT: 113 LBS | HEART RATE: 100 BPM

## 2023-10-03 DIAGNOSIS — M79.609 PAIN IN EXTREMITY, UNSPECIFIED EXTREMITY: Primary | ICD-10-CM

## 2023-10-03 DIAGNOSIS — M54.81 BILATERAL OCCIPITAL NEURALGIA: ICD-10-CM

## 2023-10-03 DIAGNOSIS — M11.869 CALCIUM PYROPHOSPHATE DEPOSITION DISEASE (CPDD) OF KNEE: ICD-10-CM

## 2023-10-03 DIAGNOSIS — M54.2 TRIGGER POINT WITH NECK PAIN: ICD-10-CM

## 2023-10-03 RX ORDER — TRIAMCINOLONE ACETONIDE 40 MG/ML
40 INJECTION, SUSPENSION INTRA-ARTICULAR; INTRAMUSCULAR ONCE
Status: COMPLETED | OUTPATIENT
Start: 2023-10-03 | End: 2023-10-03

## 2023-10-03 RX ORDER — LIDOCAINE HYDROCHLORIDE 10 MG/ML
20 INJECTION, SOLUTION EPIDURAL; INFILTRATION; INTRACAUDAL; PERINEURAL ONCE
Status: COMPLETED | OUTPATIENT
Start: 2023-10-03 | End: 2023-10-03

## 2023-10-03 RX ORDER — CYCLOBENZAPRINE HCL 10 MG
10 TABLET ORAL 2 TIMES DAILY PRN
Qty: 180 TABLET | Refills: 3 | Status: SHIPPED | OUTPATIENT
Start: 2023-10-03

## 2023-10-03 RX ADMIN — LIDOCAINE HYDROCHLORIDE 20 ML: 10 INJECTION, SOLUTION EPIDURAL; INFILTRATION; INTRACAUDAL; PERINEURAL at 12:19

## 2023-10-03 RX ADMIN — TRIAMCINOLONE ACETONIDE 40 MG: 40 INJECTION, SUSPENSION INTRA-ARTICULAR; INTRAMUSCULAR at 12:19

## 2023-10-03 NOTE — PROGRESS NOTES
By  Hany Ashley MA    Ordering Provider: Samuel DO Katelyn    NDC: 0622-4152-74    Lot#: [id number]    : [place]    Patient Supplied?: No              triamcinolone acetonide (KENALOG-40) injection 40 mg       Admin Date  10/03/2023  12:19 Action  Given Dose  40 mg Route  IntraMUSCular Site  Other Administered By  Hany Ashley MA    Ordering Provider: Samuel DO Katelyn    NDC: 4925-4979-68    Lot#: [id number]    : [facility] (PRIMARY CARE)    Patient Supplied?: No                        Nato Gu D.O. [facility]  [facility]. [address] [facility], [address] [place] [address]  Phone: [phone]  Fax: [phone]    10/4/2023    Chief Complaint   Patient presents with    Knee Pain    Migraine     Trigger point injections and left knee injection       Last injection: 8/25/23  Taking anticoagulants/antiplatelets: No  Diabetic: No  Febrile/active infection: No    Ambulatory Procedure Time Out  Correct Patient: Yes  Correct Procedure: Yes  Correct Site/Side: Yes  Correct Site(s) Marked: Yes  Informed Consent Signed: Yes  Allergies Verified: Yes  Staff Present & Credential[de-identified] Ivette Calhoun LPN/ Dr. Darrick Gomes    Diagnosis:  1. Pain in extremity, unspecified extremity    2. Trigger point with neck pain    3. 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 Bilateral occipital neuralgia        After explaining the indications, risks, benefits and alternatives of a Bilateral trapezius, bilateral rhomboid trigger points X4, the patient agreed to proceed. A permit was signed and scanned into the chart. The patient was placed in the  seated  position. The skin over the trigger point was prepared with alcohol. Using aseptic no touch technique, a 22 gauge, 1 1/2\" needle with 8 cc of Xylocaine 1% was directed into the trigger point.   After negative aspiration, t2 cc of the he medication was injected in a fanning out method at

## 2023-10-30 ENCOUNTER — OFFICE VISIT (OUTPATIENT)
Dept: PHYSICAL MEDICINE AND REHAB | Age: 58
End: 2023-10-30
Payer: COMMERCIAL

## 2023-10-30 VITALS — WEIGHT: 110 LBS | HEIGHT: 62 IN | BODY MASS INDEX: 20.24 KG/M2

## 2023-10-30 DIAGNOSIS — G43.709 CHRONIC MIGRAINE WITHOUT AURA WITHOUT STATUS MIGRAINOSUS, NOT INTRACTABLE: Primary | ICD-10-CM

## 2023-10-30 DIAGNOSIS — M54.2 TRIGGER POINT OF NECK: ICD-10-CM

## 2023-10-30 PROCEDURE — 99214 OFFICE O/P EST MOD 30 MIN: CPT | Performed by: PHYSICAL MEDICINE & REHABILITATION

## 2023-10-30 PROCEDURE — 20552 NJX 1/MLT TRIGGER POINT 1/2: CPT | Performed by: PHYSICAL MEDICINE & REHABILITATION

## 2023-10-30 RX ORDER — PROPRANOLOL HYDROCHLORIDE 10 MG/1
10 TABLET ORAL DAILY
COMMUNITY
Start: 2023-10-20

## 2023-10-30 RX ORDER — TRAMADOL HYDROCHLORIDE 50 MG/1
50 TABLET ORAL 4 TIMES DAILY PRN
COMMUNITY
Start: 2023-10-27

## 2023-10-30 RX ORDER — ATOGEPANT 60 MG/1
1 TABLET ORAL DAILY
COMMUNITY
Start: 2023-10-07

## 2023-10-30 RX ORDER — LIDOCAINE HYDROCHLORIDE 10 MG/ML
4 INJECTION, SOLUTION EPIDURAL; INFILTRATION; INTRACAUDAL; PERINEURAL ONCE
Status: COMPLETED | OUTPATIENT
Start: 2023-10-30 | End: 2023-10-30

## 2023-10-30 RX ORDER — ZOLMITRIPTAN 5 MG/1
TABLET, FILM COATED ORAL
Qty: 6 TABLET | Refills: 5 | Status: SHIPPED | OUTPATIENT
Start: 2023-10-30

## 2023-10-30 RX ADMIN — LIDOCAINE HYDROCHLORIDE 4 ML: 10 INJECTION, SOLUTION EPIDURAL; INFILTRATION; INTRACAUDAL; PERINEURAL at 14:15

## 2023-10-30 NOTE — PROGRESS NOTES
care.  The patient was clinically monitored after the injection and left the office without incident. There was post-injection reduction in pain and improved range of motion. Josepha Severe, D.O., P.T.   Board Certified Physical Medicine and Rehabilitation  Board Certified Electrodiagnostic Medicine    Administrations This Visit       lidocaine PF 1 % injection 4 mL       Admin Date  10/30/2023  14:15 Action  Given Dose  4 mL Route  IntraDERmal Site  Other Administered By  Lisa Liu LPN    Ordering Provider: Hillary Giron DO    [address]: [id number]    Lot#: [id number]    : [facility]    Patient Supplied?: No

## 2023-11-15 ENCOUNTER — NURSE ONLY (OUTPATIENT)
Dept: PHYSICAL MEDICINE AND REHAB | Age: 58
End: 2023-11-15
Payer: COMMERCIAL

## 2023-11-15 VITALS — TEMPERATURE: 97.6 F

## 2023-11-15 DIAGNOSIS — G43.709 CHRONIC MIGRAINE WITHOUT AURA WITHOUT STATUS MIGRAINOSUS, NOT INTRACTABLE: Primary | ICD-10-CM

## 2023-11-15 PROCEDURE — 99211 OFF/OP EST MAY X REQ PHY/QHP: CPT | Performed by: PHYSICAL MEDICINE & REHABILITATION

## 2023-11-15 RX ORDER — KETOROLAC TROMETHAMINE 15 MG/ML
15 INJECTION, SOLUTION INTRAMUSCULAR; INTRAVENOUS ONCE
Status: COMPLETED | OUTPATIENT
Start: 2023-11-15 | End: 2023-11-16

## 2023-11-16 PROCEDURE — 96372 THER/PROPH/DIAG INJ SC/IM: CPT | Performed by: PHYSICAL MEDICINE & REHABILITATION

## 2023-11-16 RX ADMIN — KETOROLAC TROMETHAMINE 15 MG: 15 INJECTION, SOLUTION INTRAMUSCULAR; INTRAVENOUS at 10:22

## 2023-11-22 DIAGNOSIS — G43.709 CHRONIC MIGRAINE WITHOUT AURA WITHOUT STATUS MIGRAINOSUS, NOT INTRACTABLE: ICD-10-CM

## 2023-11-27 RX ORDER — BUTALBITAL, ACETAMINOPHEN AND CAFFEINE 300; 40; 50 MG/1; MG/1; MG/1
1 CAPSULE ORAL 2 TIMES DAILY PRN
Qty: 60 CAPSULE | Refills: 2 | Status: SHIPPED | OUTPATIENT
Start: 2023-11-27

## 2023-12-11 ENCOUNTER — OFFICE VISIT (OUTPATIENT)
Dept: PHYSICAL MEDICINE AND REHAB | Age: 58
End: 2023-12-11
Payer: COMMERCIAL

## 2023-12-11 VITALS
BODY MASS INDEX: 21.35 KG/M2 | TEMPERATURE: 97.1 F | HEIGHT: 62 IN | DIASTOLIC BLOOD PRESSURE: 85 MMHG | HEART RATE: 100 BPM | WEIGHT: 116 LBS | SYSTOLIC BLOOD PRESSURE: 120 MMHG

## 2023-12-11 DIAGNOSIS — M54.2 TRIGGER POINT OF NECK: Primary | ICD-10-CM

## 2023-12-11 PROCEDURE — 20553 NJX 1/MLT TRIGGER POINTS 3/>: CPT | Performed by: PHYSICAL MEDICINE & REHABILITATION

## 2023-12-11 RX ORDER — ATOGEPANT 60 MG/1
1 TABLET ORAL DAILY
Qty: 60 TABLET | Refills: 2 | Status: SHIPPED | OUTPATIENT
Start: 2023-12-11 | End: 2024-01-10

## 2023-12-11 RX ORDER — LIDOCAINE HYDROCHLORIDE 10 MG/ML
8 INJECTION, SOLUTION EPIDURAL; INFILTRATION; INTRACAUDAL; PERINEURAL ONCE
Status: COMPLETED | OUTPATIENT
Start: 2023-12-11 | End: 2023-12-13

## 2023-12-11 NOTE — PROGRESS NOTES
Armando Livingston D.O. [facility]  [address] [facility]. [address], [address] [place] [address]  Phone: [phone]  Fax: [phone]    12/13/2023    Chief Complaint   Patient presents with    Neck Pain     Trigger point injections       Last injection: 10/30/23  Taking anticoagulants/antiplatelets: No  Diabetic: No  Febrile/active infection: No    Ambulatory Procedure Time Out  Correct Patient: Yes  Correct Procedure: Yes  Correct Site/Side: Yes  Correct Site(s) Marked: Yes  Informed Consent Signed: Yes  Allergies Verified: Yes  Staff Present & Credential[de-identified] Waqar Amos LPN/ Armando Livingston    Diagnosis:  1. Trigger point of neck        After explaining the indications, risks, benefits and alternatives of a Bilateral trapezius and rhomboid trigger points X4, the patient agreed to proceed. A permit was signed and scanned into the chart. The patient was placed in the  seated position. The skin over the trigger point was prepared with alcohol. Using aseptic no touch technique, a 22 gauge, 1 1/2\" needle with 8 cc of Xylocaine 1% was directed into the trigger point. After negative aspiration, 2 cc of the medication was injected in a fanning out method at each of 4 trigger points. Adequate hemostasis was achieved and a bandage applied. The patient tolerated the procedure well and was educated in post injection care. The patient was clinically monitored after the injection and left the office without incident. There was post-injection reduction in pain and improved range of motion.     /  Armando Livingston D.O., P.T.   Board Certified Physical Medicine and Rehabilitation  Board Certified Electrodiagnostic Medicine    Administrations This Visit       lidocaine PF 1 % injection 8 mL       Admin Date  12/13/2023  08:13 Action  Given Dose  8 mL Route  IntraDERmal Site  Other Administered By  Jenny Ruiz RN    Ordering Provider: Emanuel Cortes DO    [address]: [id number]    Lot#: [id number]

## 2023-12-11 NOTE — TELEPHONE ENCOUNTER
Patient was here today for trigger point injections, wants a refill for Nayla Benson 30 day supply for mail away pharmacy  please advise

## 2023-12-13 RX ADMIN — LIDOCAINE HYDROCHLORIDE 8 ML: 10 INJECTION, SOLUTION EPIDURAL; INFILTRATION; INTRACAUDAL; PERINEURAL at 08:13

## 2024-01-03 ENCOUNTER — OFFICE VISIT (OUTPATIENT)
Dept: PHYSICAL MEDICINE AND REHAB | Age: 59
End: 2024-01-03
Payer: COMMERCIAL

## 2024-01-03 VITALS
SYSTOLIC BLOOD PRESSURE: 130 MMHG | HEIGHT: 62 IN | TEMPERATURE: 97.2 F | WEIGHT: 117 LBS | BODY MASS INDEX: 21.53 KG/M2 | DIASTOLIC BLOOD PRESSURE: 80 MMHG | HEART RATE: 90 BPM

## 2024-01-03 DIAGNOSIS — M54.2 TRIGGER POINT OF NECK: Primary | ICD-10-CM

## 2024-01-03 PROCEDURE — 20553 NJX 1/MLT TRIGGER POINTS 3/>: CPT | Performed by: PHYSICAL MEDICINE & REHABILITATION

## 2024-01-03 RX ORDER — LIDOCAINE HYDROCHLORIDE 10 MG/ML
8 INJECTION, SOLUTION EPIDURAL; INFILTRATION; INTRACAUDAL; PERINEURAL ONCE
Status: COMPLETED | OUTPATIENT
Start: 2024-01-03 | End: 2024-01-03

## 2024-01-03 RX ORDER — PROPRANOLOL HYDROCHLORIDE 20 MG/1
20 TABLET ORAL 2 TIMES DAILY
COMMUNITY
Start: 2023-11-27

## 2024-01-03 RX ADMIN — LIDOCAINE HYDROCHLORIDE 8 ML: 10 INJECTION, SOLUTION EPIDURAL; INFILTRATION; INTRACAUDAL; PERINEURAL at 15:50

## 2024-01-03 NOTE — PROGRESS NOTES
Solange Mason D.O.  [facility]  [address] [facility] [place] [address]  Phone: [phone]  Fax: [phone]    1/3/2024    Chief Complaint   Patient presents with    Neck Pain     Trigger point injections       Last injection: 12/11/23  Taking anticoagulants/antiplatelets: No  Diabetic: No  Febrile/active infection: No    Ambulatory Procedure Time Out  Correct Patient: Yes  Correct Procedure: Yes  Correct Site/Side: Yes  Correct Site(s) Marked: Yes  Informed Consent Signed: Yes  Allergies Verified: Yes  Staff Present & Credential:: Barby Hamilton LPN/ Dr. Solange Mason    Diagnosis:  1. Trigger point of neck        After explaining the indications, risks, benefits and alternatives of a Right SCM, bilateral trapezius, left Levator scapulae trigger point X4, the patient agreed to proceed.  A permit was signed and scanned into the chart.  The patient was placed in the  seated position. The skin over the trigger point was prepared with alcohol.  Using aseptic no touch technique, a 22 gauge, 1 1/2\" needle with 8 cc of Xylocaine 1% was directed into the trigger point.  After negative aspiration, 2 cc of the medication was injected in a fanning out method at each of 4 trigger points.  Adequate hemostasis was achieved and a bandage applied.  The patient tolerated the procedure well and was educated in post injection care.  The patient was clinically monitored after the injection and left the office without incident. There was post-injection reduction in pain and improved range of motion.       Solange Mason D.O., P.T.  Board Certified Physical Medicine and Rehabilitation  Board Certified Electrodiagnostic Medicine    Administrations This Visit       lidocaine PF 1 % injection 8 mL       Admin Date  01/03/2024  15:50 Action  Given Dose  8 mL Route  IntraDERmal Site  Other Administered By  Darling Neri MA    Ordering Provider: Solange Mason DO    NDC: 0524-7697-40

## 2024-01-16 RX ORDER — ZOLMITRIPTAN 5 MG/1
TABLET, FILM COATED ORAL
Qty: 6 TABLET | Refills: 5 | Status: SHIPPED | OUTPATIENT
Start: 2024-01-16

## 2024-01-16 NOTE — TELEPHONE ENCOUNTER
Called and spoke with the patient and informed her that her medication was sent to her pharmacy.  Patient is aware and voiced understanding.

## 2024-01-16 NOTE — TELEPHONE ENCOUNTER
Patient requesting a refill for zomig, clarified with pharmacy since 5 refills on original script from October. Patient has used all her refills. Patient allotted 3 pills per 7 days and she gets a refill then.  Her next appointment is Feb 6 2024 for trigger point  please advise

## 2024-02-01 ENCOUNTER — NURSE ONLY (OUTPATIENT)
Dept: PHYSICAL MEDICINE AND REHAB | Age: 59
End: 2024-02-01
Payer: COMMERCIAL

## 2024-02-01 DIAGNOSIS — M54.2 TRIGGER POINT OF NECK: Primary | ICD-10-CM

## 2024-02-01 DIAGNOSIS — G43.709 CHRONIC MIGRAINE WITHOUT AURA WITHOUT STATUS MIGRAINOSUS, NOT INTRACTABLE: ICD-10-CM

## 2024-02-01 PROCEDURE — 99211 OFF/OP EST MAY X REQ PHY/QHP: CPT | Performed by: PHYSICAL MEDICINE & REHABILITATION

## 2024-02-03 RX ORDER — KETOROLAC TROMETHAMINE 15 MG/ML
15 INJECTION, SOLUTION INTRAMUSCULAR; INTRAVENOUS ONCE
Status: SHIPPED | OUTPATIENT
Start: 2024-02-03

## 2024-02-06 ENCOUNTER — OFFICE VISIT (OUTPATIENT)
Dept: PHYSICAL MEDICINE AND REHAB | Age: 59
End: 2024-02-06

## 2024-02-06 VITALS
DIASTOLIC BLOOD PRESSURE: 78 MMHG | BODY MASS INDEX: 20.98 KG/M2 | HEIGHT: 62 IN | SYSTOLIC BLOOD PRESSURE: 128 MMHG | WEIGHT: 114 LBS | HEART RATE: 82 BPM

## 2024-02-06 DIAGNOSIS — G47.33 OBSTRUCTIVE SLEEP APNEA SYNDROME: Primary | ICD-10-CM

## 2024-02-06 DIAGNOSIS — M79.10 TRIGGER POINT: ICD-10-CM

## 2024-02-06 DIAGNOSIS — G43.709 CHRONIC MIGRAINE WITHOUT AURA WITHOUT STATUS MIGRAINOSUS, NOT INTRACTABLE: ICD-10-CM

## 2024-02-06 RX ORDER — NORTRIPTYLINE HYDROCHLORIDE 10 MG/1
10 CAPSULE ORAL NIGHTLY
Qty: 30 CAPSULE | Refills: 2 | Status: SHIPPED | OUTPATIENT
Start: 2024-02-06 | End: 2024-05-06

## 2024-02-06 RX ORDER — TRAZODONE HYDROCHLORIDE 100 MG/1
100 TABLET ORAL NIGHTLY
COMMUNITY
Start: 2024-01-16 | End: 2024-02-06

## 2024-02-06 RX ORDER — BUTALBITAL, ACETAMINOPHEN AND CAFFEINE 300; 40; 50 MG/1; MG/1; MG/1
1 CAPSULE ORAL 2 TIMES DAILY PRN
Qty: 60 CAPSULE | Refills: 2 | Status: SHIPPED | OUTPATIENT
Start: 2024-02-06

## 2024-02-06 RX ADMIN — LIDOCAINE HYDROCHLORIDE 8 ML: 10 INJECTION, SOLUTION EPIDURAL; INFILTRATION; INTRACAUDAL; PERINEURAL at 15:30

## 2024-02-07 NOTE — PROGRESS NOTES
[facility]     Number of Visits Requested:   1    ME INJECT TRIGGER POINTS, 3 OR GREATER     Orders Placed This Encounter   Medications    butalbital-APAP-caffeine (FIORICET) -40 MG CAPS per capsule     Sig: Take 1 capsule by mouth 2 times daily as needed for Headaches     Dispense:  60 capsule     Refill:  2    nortriptyline (PAMELOR) 10 MG capsule     Sig: Take 1 capsule by mouth nightly     Dispense:  30 capsule     Refill:  2    lidocaine PF 1 % injection 8 mL           Solange Mason D.O., P.T.  Board Certified Physical Medicine and Rehabilitation  Board Certified Electrodiagnostic Medicine    Administrations This Visit       lidocaine PF 1 % injection 8 mL       Admin Date  02/06/2024  15:30 Action  Given Dose  8 mL Route  IntraDERmal Site  Other Administered By  Barby Hamilton LPN    Ordering Provider: Solange Mason DO    NDC: 7128-1259-96    Lot#: [id number]    : [facility]    Patient Supplied?: No

## 2024-02-08 RX ORDER — LIDOCAINE HYDROCHLORIDE 10 MG/ML
8 INJECTION, SOLUTION EPIDURAL; INFILTRATION; INTRACAUDAL; PERINEURAL ONCE
Status: COMPLETED | OUTPATIENT
Start: 2024-02-08 | End: 2024-02-06

## 2024-03-04 NOTE — PROGRESS NOTES
Procedures    US GUIDED NEEDLE PLACEMENT     Order Specific Question:   Will this be performed in the office today? If yes, the order will immediately fall to your reading worklist where you can document your result.     Answer:   Yes    NE INJECT TRIGGER POINTS, 3 OR GREATER     Orders Placed This Encounter   Medications    lidocaine PF 1 % injection 8 mL           Solange Mason D.O., P.T.  Board Certified Physical Medicine and Rehabilitation  Board Certified Electrodiagnostic Medicine    Administrations This Visit       lidocaine PF 1 % injection 8 mL       Admin Date  03/05/2024  13:52 Action  Given Dose  8 mL Route  IntraDERmal Site  Other Administered By  Dary Hoff LPN    Ordering Provider: Solange Mason DO    NDC: 3107-7416-81    Lot#: [id number]    : [place]    Patient Supplied?: No

## 2024-03-04 NOTE — PROGRESS NOTES
Solange Mason D.O.  [facility] [place] [facility]  [address] [place] [address] [address]  Phone: [phone]  Fax: [phone]    3/18/2024    Chief Complaint   Patient presents with    Migraine     200 units botox       Informed Consent:  The indications, risks, benefits, and  alternatives of onabotulinum toxin A were discussed with the patient. I explained to the patient the potential side effects including but not limited to: distant spread of toxin effect causing droopy eyelids, facial drooping, neck pain, headache, double vision, muscle pain/spasm/weakness/stiffness,  bronchitis,  injection site pain, increased blood pressure, hypersensitivity, anaphylaxis, difficulty swallowing, difficulty speaking, urinary incontinence and breathing difficulty.  The patient is aware that swallowing and breathing difficulties can be life threatening and there have been reports of death in some cases where onabotulinum toxin was injected.   The patient was advised of the expected benefit to include less headache days per month, and the anticipated duration of effect of 3 months. A permit was signed and scanned into the media.    Last injection: 12/18/23  Taking anticoagulants/antiplatelets: No  Diabetic: No  Febrile/active infection: No    Ambulatory Procedure Time Out  Correct Patient: Yes  Correct Procedure: Yes  Correct Site/Side: Yes  Correct Site(s) Marked: Yes  Informed Consent Signed: Yes  Allergies Verified: Yes  Staff Present & Credential:: Barby Hamilton LPN/ Dr. Solange Mason    Diagnosis:  1. Chronic migraine without aura without status migrainosus, not intractable          Procedure note: After obtaining verbal and written consent from the patient for injection of  onabotulinum toxin A the patient agreed to proceed.    200 units of onabotulinum toxin A was reconstituted using 4 cc of preservative free normal saline ( 5 units per 0.1 ml).  The medication was injected using a 30 g 0.5\"

## 2024-03-05 ENCOUNTER — OFFICE VISIT (OUTPATIENT)
Dept: PHYSICAL MEDICINE AND REHAB | Age: 59
End: 2024-03-05
Payer: COMMERCIAL

## 2024-03-05 VITALS
WEIGHT: 119 LBS | HEIGHT: 62 IN | TEMPERATURE: 97.5 F | HEART RATE: 103 BPM | BODY MASS INDEX: 21.9 KG/M2 | SYSTOLIC BLOOD PRESSURE: 133 MMHG | DIASTOLIC BLOOD PRESSURE: 77 MMHG

## 2024-03-05 DIAGNOSIS — M79.609 PAIN IN EXTREMITY, UNSPECIFIED EXTREMITY: Primary | ICD-10-CM

## 2024-03-05 DIAGNOSIS — M54.2 TRIGGER POINT OF NECK: ICD-10-CM

## 2024-03-05 PROCEDURE — 20553 NJX 1/MLT TRIGGER POINTS 3/>: CPT | Performed by: PHYSICAL MEDICINE & REHABILITATION

## 2024-03-05 RX ORDER — TRIAMCINOLONE ACETONIDE 40 MG/ML
40 INJECTION, SUSPENSION INTRA-ARTICULAR; INTRAMUSCULAR ONCE
Status: CANCELLED | OUTPATIENT
Start: 2024-03-05 | End: 2024-03-05

## 2024-03-05 RX ORDER — LIDOCAINE HYDROCHLORIDE 10 MG/ML
8 INJECTION, SOLUTION EPIDURAL; INFILTRATION; INTRACAUDAL; PERINEURAL ONCE
Status: COMPLETED | OUTPATIENT
Start: 2024-03-05 | End: 2024-03-05

## 2024-03-05 RX ADMIN — LIDOCAINE HYDROCHLORIDE 8 ML: 10 INJECTION, SOLUTION EPIDURAL; INFILTRATION; INTRACAUDAL; PERINEURAL at 13:52

## 2024-03-12 ENCOUNTER — OFFICE VISIT (OUTPATIENT)
Dept: SLEEP CENTER | Age: 59
End: 2024-03-12
Payer: COMMERCIAL

## 2024-03-12 ENCOUNTER — NURSE ONLY (OUTPATIENT)
Dept: PHYSICAL MEDICINE AND REHAB | Age: 59
End: 2024-03-12

## 2024-03-12 VITALS
HEART RATE: 94 BPM | HEIGHT: 62 IN | SYSTOLIC BLOOD PRESSURE: 136 MMHG | DIASTOLIC BLOOD PRESSURE: 86 MMHG | BODY MASS INDEX: 21.83 KG/M2 | RESPIRATION RATE: 16 BRPM | WEIGHT: 118.61 LBS | OXYGEN SATURATION: 100 %

## 2024-03-12 DIAGNOSIS — R51.9 ACHING HEADACHE: ICD-10-CM

## 2024-03-12 DIAGNOSIS — G43.709 CHRONIC MIGRAINE WITHOUT AURA WITHOUT STATUS MIGRAINOSUS, NOT INTRACTABLE: Primary | ICD-10-CM

## 2024-03-12 DIAGNOSIS — G47.33 OBSTRUCTIVE SLEEP APNEA: Primary | ICD-10-CM

## 2024-03-12 PROCEDURE — 99204 OFFICE O/P NEW MOD 45 MIN: CPT | Performed by: NURSE PRACTITIONER

## 2024-03-12 ASSESSMENT — SLEEP AND FATIGUE QUESTIONNAIRES
HOW LIKELY ARE YOU TO NOD OFF OR FALL ASLEEP IN A CAR, WHILE STOPPED FOR A FEW MINUTES IN TRAFFIC: 0
ESS TOTAL SCORE: 8
HOW LIKELY ARE YOU TO NOD OFF OR FALL ASLEEP WHILE SITTING AND READING: 2
HOW LIKELY ARE YOU TO NOD OFF OR FALL ASLEEP WHILE SITTING AND TALKING TO SOMEONE: 0
HOW LIKELY ARE YOU TO NOD OFF OR FALL ASLEEP WHILE LYING DOWN TO REST IN THE AFTERNOON WHEN CIRCUMSTANCES PERMIT: 1
HOW LIKELY ARE YOU TO NOD OFF OR FALL ASLEEP WHILE SITTING INACTIVE IN A PUBLIC PLACE: 0
HOW LIKELY ARE YOU TO NOD OFF OR FALL ASLEEP WHILE SITTING QUIETLY AFTER LUNCH WITHOUT ALCOHOL: 1
HOW LIKELY ARE YOU TO NOD OFF OR FALL ASLEEP WHEN YOU ARE A PASSENGER IN A CAR FOR AN HOUR WITHOUT A BREAK: 3
HOW LIKELY ARE YOU TO NOD OFF OR FALL ASLEEP WHILE WATCHING TV: 1

## 2024-03-12 NOTE — PROGRESS NOTES
Patient came in today for torodol 15mg IM injection for a migraine #5 on pain scale. Administered torodol 15mg IM left deltoid

## 2024-03-12 NOTE — PROGRESS NOTES
needed for arthritic flare      hydroCHLOROthiazide (HYDRODIURIL) 12.5 MG tablet Take 1 tablet by mouth daily      biotin (VITAMIN B7) 5 MG TABS tablet Take 1 tablet by mouth daily      aspirin-acetaminophen-caffeine (EXCEDRIN MIGRAINE) 250-250-65 MG per tablet Take 1 tablet by mouth every 6 hours as needed for Headaches States OTC ( excedrin migraine )      Fexofenadine HCl (ALLEGRA ALLERGY PO) Take 1 tablet by mouth daily      Ubrogepant (UBRELVY) 100 MG TABS Take 100 mg by mouth daily as needed (headache) 16 tablet 11    azelastine (ASTELIN) 0.1 % nasal spray 1 spray by Nasal route 2 times daily Use in each nostril as directed      fluticasone (FLONASE) 50 MCG/ACT nasal spray 1 spray by Each Nostril route daily      pantoprazole (PROTONIX) 40 MG tablet 2 times daily      Onabotulinumtoxin A (BOTOX, COSMETIC,) 200 units injection Inject 155 Units into the muscle every 30 days       No current facility-administered medications for this visit.        Review of Systems  (Sleep ROS above)     Constitutional: no chills, no fever   Cardiovascular: no chest pain, no lower extremity edema.  Respiratory: no cough, no shortness of breath   Gastrointestinal:  no nausea,  no vomiting, no diarrhea.   Neurological:  no dizziness,  no headache, no memory changes.  Endocrine: No feelings of weakness    Objective:   Physical Exam  /86 (Site: Left Upper Arm, Position: Sitting, Cuff Size: Medium Adult)   Pulse 94   Resp 16   Ht 1.575 m (5' 2\")   Wt 53.8 kg (118 lb 9.7 oz)   LMP 04/10/2018 (Exact Date)   SpO2 100%   BMI 21.69 kg/m²      Additional Measurements    03/12/24 1528   Neck circumference (Inches): 15       General: No acute distress. BMI of Body mass index is 21.69 kg/m².  HEENT: Normocephalic, atraumatic. No oropharyngeal lesions. Neck circumference (Inches): 15  Mallampati class- 2  Tonsils- 1  Neck: Trachea midline  Lungs: Clear to auscultation bilaterally. No wheezes or crackles  Cardiac: Regular rate and

## 2024-03-18 ENCOUNTER — OFFICE VISIT (OUTPATIENT)
Dept: PHYSICAL MEDICINE AND REHAB | Age: 59
End: 2024-03-18
Payer: COMMERCIAL

## 2024-03-18 ENCOUNTER — TELEPHONE (OUTPATIENT)
Dept: SLEEP CENTER | Age: 59
End: 2024-03-18

## 2024-03-18 VITALS — BODY MASS INDEX: 21.58 KG/M2 | TEMPERATURE: 97.1 F | WEIGHT: 118 LBS

## 2024-03-18 DIAGNOSIS — G43.709 CHRONIC MIGRAINE WITHOUT AURA WITHOUT STATUS MIGRAINOSUS, NOT INTRACTABLE: Primary | ICD-10-CM

## 2024-03-18 PROCEDURE — 64615 CHEMODENERV MUSC MIGRAINE: CPT | Performed by: PHYSICAL MEDICINE & REHABILITATION

## 2024-03-27 RX ORDER — ZOLMITRIPTAN 5 MG/1
TABLET, FILM COATED ORAL
Qty: 6 TABLET | Refills: 5 | Status: SHIPPED | OUTPATIENT
Start: 2024-03-27

## 2024-03-27 NOTE — TELEPHONE ENCOUNTER
Patient last visit 3-18-24 next visit 4-9-24.  Patient sent refill request for Zomig 5 mg 1 tab prn sent 1-16-24 #6 5 refills.  Please advise.

## 2024-03-29 NOTE — PROGRESS NOTES
Solange Mason D.O.  [facility]  [address] [facility] [address] [place] [address]  Phone: [phone]  Fax: [phone]    4/10/2024    Chief Complaint   Patient presents with    Neck Pain    Migraine     Trigger point injections     Interval: Patient reporting significant difficulty staying and falling asleep. She reports obstruction from nasal sinus disease and states she has remote sleep study showed mild obstructive sleep apnea.  PCP prescribed Trazodone for sleep but she states her migraines have increased since starting Trazodone.     Last injection: 03/05/24  Taking anticoagulants/antiplatelets: No  Diabetic: No  Febrile/active infection: No    Ambulatory Procedure Time Out  Correct Patient: Yes  Correct Procedure: Yes  Correct Site/Side: Yes  Correct Site(s) Marked: Yes  Informed Consent Signed: Yes  Allergies Verified: Yes  Staff Present & Credential:: BECKI Maciel,     Diagnosis:  1. Trigger point of neck    2. Chronic migraine without aura without status migrainosus, not intractable    3. Cervico-occipital neuralgia of left side            After explaining the indications, risks, benefits and alternatives of a bilateral trapezius, bilateral Levator scapulae trigger point X4, the patient agreed to proceed.  A permit was signed and scanned into the chart.  The patient was placed in the  seated position. The skin over the trigger point was prepared with alcohol.  Using aseptic no touch technique, a 22 gauge, 1 1/2\" needle with 8 cc of Xylocaine 1% was directed into the trigger point.  After negative aspiration, 2 cc of the medication was injected in a fanning out method at each of 4 trigger points.  Adequate hemostasis was achieved and a bandage applied.  The patient tolerated the procedure well and was educated in post injection care.  The patient was clinically monitored after the injection and left the office without incident. There was

## 2024-04-09 ENCOUNTER — OFFICE VISIT (OUTPATIENT)
Dept: PHYSICAL MEDICINE AND REHAB | Age: 59
End: 2024-04-09
Payer: COMMERCIAL

## 2024-04-09 VITALS
WEIGHT: 115 LBS | HEART RATE: 116 BPM | HEIGHT: 62 IN | BODY MASS INDEX: 21.16 KG/M2 | DIASTOLIC BLOOD PRESSURE: 88 MMHG | SYSTOLIC BLOOD PRESSURE: 149 MMHG | TEMPERATURE: 97.2 F

## 2024-04-09 DIAGNOSIS — M54.2 TRIGGER POINT OF NECK: Primary | ICD-10-CM

## 2024-04-09 DIAGNOSIS — M54.81 CERVICO-OCCIPITAL NEURALGIA OF LEFT SIDE: ICD-10-CM

## 2024-04-09 DIAGNOSIS — G43.709 CHRONIC MIGRAINE WITHOUT AURA WITHOUT STATUS MIGRAINOSUS, NOT INTRACTABLE: ICD-10-CM

## 2024-04-09 PROCEDURE — 64405 NJX AA&/STRD GR OCPL NRV: CPT | Performed by: PHYSICAL MEDICINE & REHABILITATION

## 2024-04-09 PROCEDURE — 20553 NJX 1/MLT TRIGGER POINTS 3/>: CPT | Performed by: PHYSICAL MEDICINE & REHABILITATION

## 2024-04-10 PROCEDURE — 96372 THER/PROPH/DIAG INJ SC/IM: CPT | Performed by: PHYSICAL MEDICINE & REHABILITATION

## 2024-04-10 RX ORDER — LIDOCAINE HYDROCHLORIDE 10 MG/ML
4 INJECTION, SOLUTION EPIDURAL; INFILTRATION; INTRACAUDAL; PERINEURAL ONCE
Status: COMPLETED | OUTPATIENT
Start: 2024-04-10 | End: 2024-04-10

## 2024-04-10 RX ORDER — EPTINEZUMAB-JJMR 100 MG/ML
100 INJECTION INTRAVENOUS
Qty: 1.12 ML | Refills: 3 | Status: SHIPPED | OUTPATIENT
Start: 2024-04-10 | End: 2025-05-18

## 2024-04-10 RX ORDER — KETOROLAC TROMETHAMINE 30 MG/ML
30 INJECTION, SOLUTION INTRAMUSCULAR; INTRAVENOUS ONCE
Status: COMPLETED | OUTPATIENT
Start: 2024-04-10 | End: 2024-04-10

## 2024-04-10 RX ORDER — BUPIVACAINE HYDROCHLORIDE 2.5 MG/ML
2 INJECTION, SOLUTION INFILTRATION; PERINEURAL ONCE
Status: COMPLETED | OUTPATIENT
Start: 2024-04-10 | End: 2024-04-10

## 2024-04-10 RX ORDER — PROMETHAZINE HYDROCHLORIDE 25 MG/1
25 TABLET ORAL 4 TIMES DAILY PRN
Qty: 60 TABLET | Refills: 5 | Status: SHIPPED | OUTPATIENT
Start: 2024-04-10 | End: 2024-07-09

## 2024-04-10 RX ADMIN — LIDOCAINE HYDROCHLORIDE 4 ML: 10 INJECTION, SOLUTION EPIDURAL; INFILTRATION; INTRACAUDAL; PERINEURAL at 09:39

## 2024-04-10 RX ADMIN — BUPIVACAINE HYDROCHLORIDE 5 MG: 2.5 INJECTION, SOLUTION INFILTRATION; PERINEURAL at 09:39

## 2024-04-10 RX ADMIN — KETOROLAC TROMETHAMINE 30 MG: 30 INJECTION, SOLUTION INTRAMUSCULAR; INTRAVENOUS at 12:56

## 2024-04-12 DIAGNOSIS — M54.81 OCCIPITAL NEURALGIA, UNSPECIFIED LATERALITY: Primary | ICD-10-CM

## 2024-04-12 RX ORDER — SODIUM CHLORIDE 9 MG/ML
5-250 INJECTION, SOLUTION INTRAVENOUS PRN
OUTPATIENT
Start: 2024-04-12

## 2024-04-12 RX ORDER — HEPARIN 100 UNIT/ML
500 SYRINGE INTRAVENOUS PRN
OUTPATIENT
Start: 2024-04-12

## 2024-04-12 RX ORDER — SODIUM CHLORIDE 0.9 % (FLUSH) 0.9 %
5-40 SYRINGE (ML) INJECTION PRN
OUTPATIENT
Start: 2024-04-12

## 2024-04-16 DIAGNOSIS — G43.709 CHRONIC MIGRAINE WITHOUT AURA WITHOUT STATUS MIGRAINOSUS, NOT INTRACTABLE: ICD-10-CM

## 2024-04-17 RX ORDER — BUTALBITAL, ACETAMINOPHEN AND CAFFEINE 300; 40; 50 MG/1; MG/1; MG/1
1 CAPSULE ORAL 2 TIMES DAILY PRN
Qty: 60 CAPSULE | Refills: 2 | OUTPATIENT
Start: 2024-04-17

## 2024-04-19 RX ORDER — BUTALBITAL, ACETAMINOPHEN AND CAFFEINE 300; 40; 50 MG/1; MG/1; MG/1
1 CAPSULE ORAL 2 TIMES DAILY PRN
Qty: 60 CAPSULE | Refills: 2 | Status: SHIPPED | OUTPATIENT
Start: 2024-04-19

## 2024-04-22 ENCOUNTER — HOSPITAL ENCOUNTER (OUTPATIENT)
Dept: CT IMAGING | Age: 59
Discharge: HOME | End: 2024-04-22
Payer: COMMERCIAL

## 2024-04-22 DIAGNOSIS — R52 PAIN: ICD-10-CM

## 2024-04-22 PROCEDURE — 73700 CT LOWER EXTREMITY W/O DYE: CPT

## 2024-04-25 ENCOUNTER — TELEPHONE (OUTPATIENT)
Dept: SLEEP CENTER | Age: 59
End: 2024-04-25

## 2024-04-25 NOTE — PROGRESS NOTES
Solange Mason D.O.  [facility]  [address] [facility] [place] [address]  Phone: [phone]  Fax: [phone]    5/2/2024    Chief Complaint   Patient presents with    Neck Pain    Headache     Trigger point injections     Last injection: 03/05/24  Taking anticoagulants/antiplatelets: No  Diabetic: No  Febrile/active infection: No    Ambulatory Procedure Time Out  Correct Patient: Yes  Correct Procedure: Yes  Correct Site/Side: Yes  Correct Site(s) Marked: Yes  Informed Consent Signed: Yes  Allergies Verified: Yes  Staff Present & Credential:: BECKI Maciel,     Diagnosis:  1. Trigger point of neck            After explaining the indications, risks, benefits and alternatives of a bilateral trapezius, bilateral rhomboid, bilateral Sternocleidomastoid trigger point X6, the patient agreed to proceed.  A permit was signed and scanned into the chart.  The patient was placed in the  seated position. The skin over the trigger point was prepared with alcohol.  Using aseptic no touch technique, a 22 gauge, 1 1/2\" needle with 12  cc of Xylocaine 1% was directed into the trigger point.  After negative aspiration, 2 cc of the medication was injected in a fanning out method at each of 6 trigger points.  Adequate hemostasis was achieved and a bandage applied.  The patient tolerated the procedure well and was educated in post injection care.  The patient was clinically monitored after the injection and left the office without incident. There was post-injection reduction in pain and improved range of motion.     Orders Placed This Encounter   Procedures    AL INJECT TRIGGER POINTS, 3 OR GREATER     Orders Placed This Encounter   Medications    lidocaine PF 1 % injection 6 mL           Solange Mason D.O., P.T.  Board Certified Physical Medicine and Rehabilitation  Board Certified Electrodiagnostic Medicine    Administrations This Visit       lidocaine PF 1 %

## 2024-04-25 NOTE — TELEPHONE ENCOUNTER
Regarding Sleep Study for April 27, 2024  Confirmed appointment with the patient.  She will bring the forms.

## 2024-04-27 ENCOUNTER — HOSPITAL ENCOUNTER (OUTPATIENT)
Dept: SLEEP CENTER | Age: 59
Discharge: HOME | End: 2024-04-27
Payer: COMMERCIAL

## 2024-04-27 DIAGNOSIS — G47.33 OBSTRUCTIVE SLEEP APNEA: ICD-10-CM

## 2024-04-27 PROCEDURE — 95810 POLYSOM 6/> YRS 4/> PARAM: CPT

## 2024-04-30 ENCOUNTER — HOSPITAL ENCOUNTER (EMERGENCY)
Age: 59
Discharge: HOME | End: 2024-04-30
Attending: EMERGENCY MEDICINE
Payer: COMMERCIAL

## 2024-04-30 ENCOUNTER — OFFICE VISIT (OUTPATIENT)
Dept: FAMILY MEDICINE CLINIC | Age: 59
End: 2024-04-30
Payer: COMMERCIAL

## 2024-04-30 ENCOUNTER — OFFICE VISIT (OUTPATIENT)
Dept: PHYSICAL MEDICINE AND REHAB | Age: 59
End: 2024-04-30
Payer: COMMERCIAL

## 2024-04-30 VITALS — OXYGEN SATURATION: 98 % | SYSTOLIC BLOOD PRESSURE: 137 MMHG | DIASTOLIC BLOOD PRESSURE: 90 MMHG | HEART RATE: 114 BPM

## 2024-04-30 VITALS
DIASTOLIC BLOOD PRESSURE: 97 MMHG | TEMPERATURE: 98 F | SYSTOLIC BLOOD PRESSURE: 147 MMHG | RESPIRATION RATE: 18 BRPM | HEART RATE: 87 BPM | OXYGEN SATURATION: 99 %

## 2024-04-30 VITALS
TEMPERATURE: 97.2 F | BODY MASS INDEX: 21.71 KG/M2 | DIASTOLIC BLOOD PRESSURE: 77 MMHG | WEIGHT: 118 LBS | SYSTOLIC BLOOD PRESSURE: 137 MMHG | HEIGHT: 62 IN | HEART RATE: 118 BPM

## 2024-04-30 DIAGNOSIS — R47.81 SLURRED SPEECH: Primary | ICD-10-CM

## 2024-04-30 DIAGNOSIS — M54.2 TRIGGER POINT OF NECK: Primary | ICD-10-CM

## 2024-04-30 DIAGNOSIS — T50.905A ADVERSE EFFECT OF DRUG, INITIAL ENCOUNTER: Primary | ICD-10-CM

## 2024-04-30 DIAGNOSIS — R20.0 FACIAL NUMBNESS: ICD-10-CM

## 2024-04-30 PROCEDURE — 99212 OFFICE O/P EST SF 10 MIN: CPT

## 2024-04-30 PROCEDURE — 20553 NJX 1/MLT TRIGGER POINTS 3/>: CPT | Performed by: PHYSICAL MEDICINE & REHABILITATION

## 2024-04-30 PROCEDURE — 99283 EMERGENCY DEPT VISIT LOW MDM: CPT

## 2024-04-30 NOTE — PROGRESS NOTES
24  Virginia Barrios : 1965 Sex: female  Age 58 y.o.    Subjective:  Chief Complaint   Patient presents with    OTHER     Rapid response losing speech and facial numbness       HPI:   Virginia Barrios , 58 y.o. female presents to the clinic for evaluation of rapid response after 4 injections x today. The patient also reports slurred speech, having a hard time talking and numbness in the mouth. The patient reports no change in symptoms over time. The patient denies SOB, Chest pain, or throat swelling, headache, sinus congestion, cough, sore throat, rash, and fever. The patient also denies chest pain, abdominal pain, shortness of breath, wheezing, and nausea / vomiting / diarrhea.    ROS:   Unless otherwise stated in this report the patient's positive and negative responses for review of systems for constitutional, eyes, ENT, cardiovascular, respiratory, gastrointestinal, neurological, , musculoskeletal, and integument systems and related systems to the presenting problem are either stated in the history of present illness or were not pertinent or were negative for the symptoms and/or complaints related to the presenting medical problem.  Positives and pertinent negatives as per HPI.  All others reviewed and are negative.      PMH:     Past Medical History:   Diagnosis Date    Endometriosis     Headache     Hypertension     Increased heart rate     Osteoarthritis     Peptic ulcer        Past Surgical History:   Procedure Laterality Date    ABDOMEN SURGERY      X2 Laproscopic for endometriosis    BREAST SURGERY      HYSTERECTOMY (CERVIX STATUS UNKNOWN)      HYSTERECTOMY, VAGINAL      KNEE SURGERY Bilateral     X5 on each    NECK SURGERY      RHINOPLASTY      TONSILLECTOMY      WRIST GANGLION EXCISION      LEFT       Family History   Problem Relation Age of Onset    Osteoporosis Mother     Hypertension Father     Cancer Father        Medications:     Current Outpatient Medications:

## 2024-05-01 ENCOUNTER — TELEPHONE (OUTPATIENT)
Dept: PHYSICAL MEDICINE AND REHAB | Age: 59
End: 2024-05-01

## 2024-05-01 RX ORDER — LIDOCAINE HYDROCHLORIDE 10 MG/ML
6 INJECTION, SOLUTION EPIDURAL; INFILTRATION; INTRACAUDAL; PERINEURAL ONCE
Status: COMPLETED | OUTPATIENT
Start: 2024-05-01 | End: 2024-05-02

## 2024-05-01 NOTE — ED PROVIDER NOTES
significant symptomatic relief. Patient would like to go home.     Pt will be d/c and will follow up with her PCP. She is educated on signs and symptoms that require emergent evaluation. Pt is advised to return to the ED if her symptoms change or worsen. If her pain persists, pt may need further evaluation. Pt is agreeable to plan and all questions have been answered at this time.      1. Adverse effect of drug, initial encounter      IP CONSULT TO PHYSICAL MEDICINE REHAB    Please see ED course for more details:    ED Course as of 05/01/24 0106   Tue Apr 30, 2024   6422 Chart review shows that on today's date patient was seen by Dr. Mason for trigger point injection.  Patient had trigger point injection for chronic migraine and neck pain.  Patient had bilateral trapezius, levator scapula a trigger points x 4 injected [MS]   1808   ATTENDING PROVIDER ATTESTATION:     I have personally performed and/or participated in the history, exam, medical decision making, and procedures and agree with all pertinent clinical information unless otherwise noted.    I have also reviewed and agree with the past medical, family and social history unless otherwise noted.    I have discussed this patient in detail with the resident and provided the instruction and education regarding the evidence-based evaluation and treatment of medication reaction.    Any EKG that may have been performed has been personally reviewed by me and I agree with the documentation as noted by the resident.    History: Patient had trigger point injections today at her physician's office.  She gets chronic headaches and gets these injections.  She states that when she was walking out she felt like she had a cough.  She went to clear her chest and is felt vibrations in her chest.  She then noticed that she was having some trouble speaking due to tightness in her throat.  She walked back in and when she did they called 911 and she was brought here to be

## 2024-05-01 NOTE — TELEPHONE ENCOUNTER
Called patient who denies any symptoms and states she does not know what happened. States on the way to the hospital via ambulance back of her tongue was numb and went away. States she is just real nauseated today and mouth dry. Speech was clear. Denies any needs or concerns at this time. She states she does not think it was medication related but not sure.

## 2024-05-01 NOTE — TELEPHONE ENCOUNTER
----- Message from Solange Mason DO sent at 5/1/2024 11:24 AM EDT -----  Please call to check in on her  ----- Message -----  From: Liam Heredia DO  Sent: 5/1/2024   8:49 AM EDT  To: Solange Mason DO

## 2024-05-01 NOTE — TELEPHONE ENCOUNTER
Patient notified of Dr. Mason's recommendation to call and advise office if episode happens again.

## 2024-05-02 RX ADMIN — LIDOCAINE HYDROCHLORIDE 6 ML: 10 INJECTION, SOLUTION EPIDURAL; INFILTRATION; INTRACAUDAL; PERINEURAL at 07:52

## 2024-05-20 ENCOUNTER — OFFICE VISIT (OUTPATIENT)
Dept: SLEEP CENTER | Age: 59
End: 2024-05-20
Payer: COMMERCIAL

## 2024-05-20 ENCOUNTER — TELEPHONE (OUTPATIENT)
Dept: PHYSICAL MEDICINE AND REHAB | Age: 59
End: 2024-05-20

## 2024-05-20 VITALS
OXYGEN SATURATION: 97 % | DIASTOLIC BLOOD PRESSURE: 82 MMHG | RESPIRATION RATE: 17 BRPM | WEIGHT: 117.95 LBS | BODY MASS INDEX: 21.7 KG/M2 | HEART RATE: 118 BPM | HEIGHT: 62 IN | SYSTOLIC BLOOD PRESSURE: 146 MMHG

## 2024-05-20 DIAGNOSIS — G47.33 OBSTRUCTIVE SLEEP APNEA: Primary | ICD-10-CM

## 2024-05-20 DIAGNOSIS — R51.9 NOCTURNAL HEADACHES: ICD-10-CM

## 2024-05-20 PROCEDURE — 99214 OFFICE O/P EST MOD 30 MIN: CPT | Performed by: NURSE PRACTITIONER

## 2024-05-20 ASSESSMENT — SLEEP AND FATIGUE QUESTIONNAIRES
HOW LIKELY ARE YOU TO NOD OFF OR FALL ASLEEP WHILE SITTING AND READING: HIGH CHANCE OF DOZING
HOW LIKELY ARE YOU TO NOD OFF OR FALL ASLEEP WHILE LYING DOWN TO REST IN THE AFTERNOON WHEN CIRCUMSTANCES PERMIT: SLIGHT CHANCE OF DOZING
HOW LIKELY ARE YOU TO NOD OFF OR FALL ASLEEP WHILE SITTING AND TALKING TO SOMEONE: WOULD NEVER DOZE
HOW LIKELY ARE YOU TO NOD OFF OR FALL ASLEEP WHILE WATCHING TV: SLIGHT CHANCE OF DOZING
ESS TOTAL SCORE: 9
HOW LIKELY ARE YOU TO NOD OFF OR FALL ASLEEP WHEN YOU ARE A PASSENGER IN A CAR FOR AN HOUR WITHOUT A BREAK: HIGH CHANCE OF DOZING
HOW LIKELY ARE YOU TO NOD OFF OR FALL ASLEEP WHILE SITTING INACTIVE IN A PUBLIC PLACE: WOULD NEVER DOZE
HOW LIKELY ARE YOU TO NOD OFF OR FALL ASLEEP WHILE SITTING QUIETLY AFTER LUNCH WITHOUT ALCOHOL: SLIGHT CHANCE OF DOZING
HOW LIKELY ARE YOU TO NOD OFF OR FALL ASLEEP IN A CAR, WHILE STOPPED FOR A FEW MINUTES IN TRAFFIC: WOULD NEVER DOZE

## 2024-05-20 NOTE — PROGRESS NOTES
[facility] Sleep Medicine    Patient Name: Virginia Barrios  Age: 58 y.o.   : 1965    Date of Visit: 24        Review of Last Visit Summary:    The patient was last seen on 3/12/2024 for  Obstructive Sleep Apnea.  Interim History:     Virginia Barrios is a 58 y.o. female that  has a past medical history of Endometriosis, Headache, Hypertension, Increased heart rate, Osteoarthritis, and Peptic ulcer. She presents as follow up to Sleep Clinic to review sleep study results.    Interval Events:    -Completed in lab PSG showing mild PATEL, that worsens to a moderate degree and REM sleep.  -She continues to follow with physical medicine and rehab for migraine treatment.  She also will be following with a new GYN for potential hormonal replacement in hopes to improve headaches.  -Notes fatigue through the day.   -Will be getting her knee replaced .  -Here to review results. Open to treatment to determine if  PATEL is headache trigger.     Sleep Study History: 24-PSG-wt 118 lbs-sleep efficiency 77%, REM sleep 16.8%, AHI 9.3, REM AHI 18.2, supine AHI 12.2, SpO2 camilo 83%, T <88% was 4.6 minutes of total sleep time    Sleep History:    Patient presents to sleep medicine for evaluation due to ongoing nocturnal headaches/migraines that have been occurring every 10 years, but progressively worsening over the last 3 years.     Recently, patient has been waking up out of sleep with headaches.  She describes this \"nighttime headache \"with slightly different features than her \"typical migraine \".  Her typical migraine is unilateral, right-sided, sharp, where is the headache she wakes up with in the night is more generalized, \"viselike \".  Most times, this headache will go away on its own after several hours, other times it will turn into a migraine.  She cannot identify triggers to nocturnal headaches.  She denies late night caffeine intake.  Denies electronics in bed.     On nights that she has headaches, it

## 2024-05-20 NOTE — TELEPHONE ENCOUNTER
Pt would like to cancel her appt for an injection with Dr Mason that is scheduled for tomorrow 05/21

## 2024-06-04 NOTE — PROGRESS NOTES
Solange Mason D.O.  [facility]  [address] [place] [address]  Phone: [phone]  Fax: [phone]    Chief Complaint   Patient presents with    Migraine     Follow up after botox migraines     An independent historian was needed.    Interval history: Patient completed Botox 3/18/24 and had no side effects. She feels like the severity and frequency since starting Botox treatment.  In the last month she has had 10 migraines days.      Symptoms have been improving.      The patient has completed the following treatments since last seen: ubrelvy, Zomig, Botox.      Today, the location of pain is occipital and the severity is Pain Score:   3.     Quality: pulsating.      Severity: Pain Score:   3    Associated symptoms: photophobia, phonophobia, nausea and vomiting    Aura :no    Frequency: episodic; 10# headache days in last month.     Alleviating factors:Zomig and Ubrelvy. 10# of abortive medications in last month.     Triggers: stress    Exercise: every other day     Data reviewed/prior work up:   Independent interpretation of testing: HIT-6 completed and scored today 57. Scanned to media    Past medical, surgical, family, social history, allergies and medications were otherwise reviewed in Epic.      Physical Exam:   Blood pressure 138/85, pulse 80, weight 50.8 kg (112 lb), last menstrual period 04/10/2018.   General: No apparent distress.   Habitus: Body mass index is 20.49 kg/m². Normal weight (18.5-24.9)   MSK: There is no joint effusion, deformity, instability, swelling, erythema or warmth.  AROM is full in the spine and extremities. Spinal curvatures are normal.      Neurologic:  No focal sensorimotor deficit.  Reflexes 2+ and symmetric. Gait is normal.    Impression:   1. Chronic migraine without aura without status migrainosus, not intractable      The patient's condition is stable and currently Chronic.     Prognosis: Good    Plan:     Prescription

## 2024-06-06 ENCOUNTER — TELEPHONE (OUTPATIENT)
Dept: PHYSICAL MEDICINE AND REHAB | Age: 59
End: 2024-06-06

## 2024-06-06 NOTE — TELEPHONE ENCOUNTER
Called and left message on patient voicemail that I was calling regarding her appointment on 6-24-24.

## 2024-06-06 NOTE — TELEPHONE ENCOUNTER
Called and spoke with the patient informed her that we need to change her appointment on 24 to an office visit since her authorization is .  Patient is aware and voiced understanding.

## 2024-06-17 DIAGNOSIS — M79.10 TRIGGER POINT: ICD-10-CM

## 2024-06-17 RX ORDER — ZOLMITRIPTAN 5 MG/1
TABLET, FILM COATED ORAL
Qty: 6 TABLET | Refills: 5 | Status: SHIPPED | OUTPATIENT
Start: 2024-06-17

## 2024-06-17 RX ORDER — UBROGEPANT 100 MG/1
100 TABLET ORAL DAILY PRN
Qty: 16 TABLET | Refills: 11 | Status: CANCELLED | OUTPATIENT
Start: 2024-06-17

## 2024-06-17 NOTE — TELEPHONE ENCOUNTER
Called and spoke with patient who needs refill on Zomig, states has enough Ubrelvy until follow up on 6/24. Pended order, please advise. Called and verified with her pharmacy she does need refill

## 2024-06-24 ENCOUNTER — OFFICE VISIT (OUTPATIENT)
Dept: PHYSICAL MEDICINE AND REHAB | Age: 59
End: 2024-06-24
Payer: COMMERCIAL

## 2024-06-24 VITALS
DIASTOLIC BLOOD PRESSURE: 85 MMHG | WEIGHT: 112 LBS | BODY MASS INDEX: 20.49 KG/M2 | SYSTOLIC BLOOD PRESSURE: 138 MMHG | HEART RATE: 80 BPM

## 2024-06-24 DIAGNOSIS — G43.709 CHRONIC MIGRAINE WITHOUT AURA WITHOUT STATUS MIGRAINOSUS, NOT INTRACTABLE: Primary | ICD-10-CM

## 2024-06-24 PROCEDURE — 96372 THER/PROPH/DIAG INJ SC/IM: CPT | Performed by: PHYSICAL MEDICINE & REHABILITATION

## 2024-06-24 PROCEDURE — 99214 OFFICE O/P EST MOD 30 MIN: CPT | Performed by: PHYSICAL MEDICINE & REHABILITATION

## 2024-06-24 RX ORDER — KETOROLAC TROMETHAMINE 10 MG/1
10 TABLET, FILM COATED ORAL EVERY 6 HOURS
Qty: 12 TABLET | Refills: 0 | Status: SHIPPED | OUTPATIENT
Start: 2024-06-24 | End: 2024-06-27

## 2024-06-24 RX ORDER — KETOROLAC TROMETHAMINE 15 MG/ML
15 INJECTION, SOLUTION INTRAMUSCULAR; INTRAVENOUS ONCE
Status: COMPLETED | OUTPATIENT
Start: 2024-06-24 | End: 2024-06-24

## 2024-06-24 RX ADMIN — KETOROLAC TROMETHAMINE 15 MG: 15 INJECTION, SOLUTION INTRAMUSCULAR; INTRAVENOUS at 12:23

## 2024-06-28 ENCOUNTER — TELEPHONE (OUTPATIENT)
Dept: PHYSICAL MEDICINE AND REHAB | Age: 59
End: 2024-06-28

## 2024-06-28 NOTE — TELEPHONE ENCOUNTER
Called and spoke with Sobia from [employer] to start a prior authorization for .  Pending authorization [id number] can take up 5 calendar days.

## 2024-06-28 NOTE — TELEPHONE ENCOUNTER
Called and spoke with Lima pham prior authorization for CPT codes: 18013 and .  She stated that CPT code 78269 dose not require prior authorization reference # [id number], cpt code  requires prior authorization through [facility].

## 2024-07-07 DIAGNOSIS — G43.709 CHRONIC MIGRAINE WITHOUT AURA WITHOUT STATUS MIGRAINOSUS, NOT INTRACTABLE: ICD-10-CM

## 2024-07-07 DIAGNOSIS — M79.10 TRIGGER POINT: ICD-10-CM

## 2024-07-08 RX ORDER — UBROGEPANT 100 MG/1
100 TABLET ORAL DAILY PRN
Qty: 16 TABLET | Refills: 11 | OUTPATIENT
Start: 2024-07-08

## 2024-07-08 RX ORDER — BUTALBITAL, ACETAMINOPHEN AND CAFFEINE 300; 40; 50 MG/1; MG/1; MG/1
1 CAPSULE ORAL 2 TIMES DAILY PRN
Qty: 60 CAPSULE | Refills: 2 | OUTPATIENT
Start: 2024-07-08

## 2024-07-08 RX ORDER — ATOGEPANT 60 MG/1
1 TABLET ORAL DAILY
Qty: 90 TABLET | Refills: 3 | OUTPATIENT
Start: 2024-07-08

## 2024-07-09 ENCOUNTER — PATIENT MESSAGE (OUTPATIENT)
Dept: PHYSICAL MEDICINE AND REHAB | Age: 59
End: 2024-07-09

## 2024-07-09 NOTE — TELEPHONE ENCOUNTER
From: Virginia Barrios  To: Dr. Solange Mason  Sent: 7/9/2024 12:10 PM EDT  Subject: Quilpta     I sent prescription requests yesterday for Ubrelvy and butabital. I also need Qulipa, 60 mg once day. I can’t pull it up on prescription request .     Thank you,  Virginia

## 2024-07-12 ENCOUNTER — TELEPHONE (OUTPATIENT)
Dept: PHYSICAL MEDICINE AND REHAB | Age: 59
End: 2024-07-12

## 2024-07-12 RX ORDER — ATOGEPANT 60 MG/1
1 TABLET ORAL DAILY
Qty: 60 TABLET | Refills: 2 | Status: SHIPPED | OUTPATIENT
Start: 2024-07-12 | End: 2024-08-11

## 2024-07-12 RX ORDER — ATOGEPANT 60 MG/1
1 TABLET ORAL DAILY
Qty: 90 TABLET | Refills: 3 | OUTPATIENT
Start: 2024-07-12

## 2024-07-12 NOTE — TELEPHONE ENCOUNTER
Pt called in to [facility] a trigger point. Virginia can be reached at [phone]. Thank you.

## 2024-07-23 RX ORDER — LIDOCAINE HYDROCHLORIDE 10 MG/ML
INJECTION, SOLUTION EPIDURAL; INFILTRATION; INTRACAUDAL; PERINEURAL ONCE
Status: CANCELLED | OUTPATIENT
Start: 2024-07-23 | End: 2024-07-23

## 2024-07-23 NOTE — PROGRESS NOTES
(TORADOL) injection 15 mg       Admin Date  07/30/2024  10:57 Action  Given Dose  15 mg Route  IntraMUSCular Site  Dorsogluteal Right Administered By  Rody Ambriz, RN    Ordering Provider: Solange Mason DO    NDC: 0431-7900-17    Lot#: [id number]    : HOSPIRA    Patient Supplied?: No

## 2024-07-23 NOTE — PROGRESS NOTES
Solange Mason D.O.  [facility]  [address] [place] [address]  Phone: [phone]  Fax: [phone]    8/1/2024    Chief Complaint   Patient presents with    Migraine     Botox 200 units injection       Informed Consent:  The indications, risks, benefits, and  alternatives of onabotulinum toxin A were discussed with the patient. I explained to the patient the potential side effects including but not limited to: distant spread of toxin effect causing droopy eyelids, facial drooping, neck pain, headache, double vision, muscle pain/spasm/weakness/stiffness,  bronchitis,  injection site pain, increased blood pressure, hypersensitivity, anaphylaxis, difficulty swallowing, difficulty speaking, urinary incontinence and breathing difficulty.  The patient is aware that swallowing and breathing difficulties can be life threatening and there have been reports of death in some cases where onabotulinum toxin was injected.   The patient was advised of the expected benefit to include less headache days per month, and the anticipated duration of effect of 3 months. A permit was signed and scanned into the media.    Last injection: 3/18/24  Taking anticoagulants/antiplatelets: No  Diabetic: No  Febrile/active infection: No    Ambulatory Procedure Time Out  Correct Patient: Yes  Correct Procedure: Yes  Correct Site/Side: Yes  Correct Site(s) Marked: Yes  Informed Consent Signed: Yes  Allergies Verified: Yes  Staff Present & Credential:: Rody Kahn/    Diagnosis:  1. Chronic migraine without aura without status migrainosus, not intractable    2. Trigger point        Procedure note: After obtaining verbal and written consent from the patient for injection of  onabotulinum toxin A the patient agreed to proceed.    200 units of onabotulinum toxin A was reconstituted using 4 cc of preservative free normal saline ( 5 units per 0.1 ml).  The medication was injected

## 2024-07-25 RX ORDER — LIDOCAINE HYDROCHLORIDE 10 MG/ML
INJECTION, SOLUTION INFILTRATION; PERINEURAL ONCE
Status: CANCELLED | OUTPATIENT
Start: 2024-07-25 | End: 2024-07-25

## 2024-07-26 ENCOUNTER — OFFICE VISIT (OUTPATIENT)
Dept: PHYSICAL MEDICINE AND REHAB | Age: 59
End: 2024-07-26
Payer: COMMERCIAL

## 2024-07-26 VITALS
BODY MASS INDEX: 21.58 KG/M2 | HEART RATE: 110 BPM | TEMPERATURE: 97.2 F | SYSTOLIC BLOOD PRESSURE: 129 MMHG | DIASTOLIC BLOOD PRESSURE: 77 MMHG | WEIGHT: 118 LBS

## 2024-07-26 DIAGNOSIS — M79.10 TRIGGER POINT: ICD-10-CM

## 2024-07-26 DIAGNOSIS — G43.709 CHRONIC MIGRAINE WITHOUT AURA WITHOUT STATUS MIGRAINOSUS, NOT INTRACTABLE: ICD-10-CM

## 2024-07-26 PROCEDURE — 64615 CHEMODENERV MUSC MIGRAINE: CPT | Performed by: PHYSICAL MEDICINE & REHABILITATION

## 2024-07-26 RX ORDER — UBROGEPANT 100 MG/1
100 TABLET ORAL DAILY PRN
Qty: 16 TABLET | Refills: 11 | Status: SHIPPED | OUTPATIENT
Start: 2024-07-26

## 2024-07-26 RX ORDER — BUTALBITAL, ACETAMINOPHEN AND CAFFEINE 300; 40; 50 MG/1; MG/1; MG/1
1 CAPSULE ORAL 2 TIMES DAILY PRN
Qty: 60 CAPSULE | Refills: 2 | Status: SHIPPED | OUTPATIENT
Start: 2024-07-26

## 2024-07-26 RX ORDER — PROMETHAZINE HYDROCHLORIDE 25 MG/1
25 TABLET ORAL 4 TIMES DAILY PRN
Qty: 60 TABLET | Refills: 5 | Status: SHIPPED | OUTPATIENT
Start: 2024-07-26 | End: 2024-10-24

## 2024-07-30 ENCOUNTER — OFFICE VISIT (OUTPATIENT)
Dept: PHYSICAL MEDICINE AND REHAB | Age: 59
End: 2024-07-30
Payer: COMMERCIAL

## 2024-07-30 VITALS
HEART RATE: 80 BPM | WEIGHT: 118 LBS | DIASTOLIC BLOOD PRESSURE: 89 MMHG | TEMPERATURE: 97.2 F | SYSTOLIC BLOOD PRESSURE: 137 MMHG | BODY MASS INDEX: 21.71 KG/M2 | HEIGHT: 62 IN

## 2024-07-30 DIAGNOSIS — M54.81 BILATERAL OCCIPITAL NEURALGIA: Primary | ICD-10-CM

## 2024-07-30 PROCEDURE — 64405 NJX AA&/STRD GR OCPL NRV: CPT | Performed by: PHYSICAL MEDICINE & REHABILITATION

## 2024-07-30 PROCEDURE — 96372 THER/PROPH/DIAG INJ SC/IM: CPT | Performed by: PHYSICAL MEDICINE & REHABILITATION

## 2024-07-30 RX ORDER — ESTRADIOL 0.05 MG/D
2 PATCH TRANSDERMAL WEEKLY
COMMUNITY

## 2024-07-30 RX ORDER — KETOROLAC TROMETHAMINE 15 MG/ML
15 INJECTION, SOLUTION INTRAMUSCULAR; INTRAVENOUS ONCE
Status: COMPLETED | OUTPATIENT
Start: 2024-07-30 | End: 2024-07-30

## 2024-07-30 RX ORDER — BUPIVACAINE HYDROCHLORIDE 2.5 MG/ML
30 INJECTION, SOLUTION EPIDURAL; INFILTRATION; INTRACAUDAL ONCE
Status: COMPLETED | OUTPATIENT
Start: 2024-07-30 | End: 2024-07-30

## 2024-07-30 RX ADMIN — KETOROLAC TROMETHAMINE 15 MG: 15 INJECTION, SOLUTION INTRAMUSCULAR; INTRAVENOUS at 10:57

## 2024-07-30 RX ADMIN — BUPIVACAINE HYDROCHLORIDE 75 MG: 2.5 INJECTION, SOLUTION EPIDURAL; INFILTRATION; INTRACAUDAL at 10:57

## 2024-08-13 NOTE — PROGRESS NOTES
Solange Mason D.O.  [facility]  [address] [place] [address]  Phone: [phone]  Fax: [phone]    9/2/2024    Chief Complaint   Patient presents with    Headache     Occipital nerve block       Last injection: 7/30/2024  Taking anticoagulants/antiplatelets: No  Diabetic: No  Febrile/active infection: No    Ambulatory Procedure Time Out  Correct Patient: Yes  Correct Procedure: Yes  Correct Site/Side: Yes  Correct Site(s) Marked: Yes  Informed Consent Signed: Yes  Allergies Verified: Yes  Staff Present & Credential:: Barby Hamilton LPN/ Dr. Solange Mason    Diagnosis:  1. Bilateral occipital neuralgia          After explaining the indications, risks, benefits and alternatives of a Bilateral occipital nerve block, the patient agreed to proceed.  A permit was signed and scanned into the media. The patient was placed in the seated position. The skin was prepared with alcohol.  Using an aseptic, no touch technique, a 25 gauge, 5/8\" needle with 2 cc of Bupivicaine 0.25% was directed to the occipital notch at the point of maximal tenderness. After negative aspiration, the medication was injected was injected.    Adequate hemostasis was obtained. The patient tolerated the procedure well and was educated in post injection care.  The patient was monitored clinically and left the office without incident. Pain was reduced post-injection.       Solange Mason D.O., P.T.  Board Certified Physical Medicine and Rehabilitation  Board Certified Electrodiagnostic Medicine    Administrations This Visit       BUPivacaine (PF) (MARCAINE) 0.25 % injection 10 mg       Admin Date  08/30/2024  15:45 Action  Given Dose  10 mg Route  IntraDERmal Site  Other Documented By  Barby Hamilton LPN    NDC: 01242-118-36    Lot#: [id number]    : pic5 [facility]    Patient Supplied?: No

## 2024-08-30 ENCOUNTER — OFFICE VISIT (OUTPATIENT)
Dept: PHYSICAL MEDICINE AND REHAB | Age: 59
End: 2024-08-30

## 2024-08-30 VITALS
HEART RATE: 103 BPM | HEIGHT: 62 IN | DIASTOLIC BLOOD PRESSURE: 79 MMHG | SYSTOLIC BLOOD PRESSURE: 138 MMHG | TEMPERATURE: 97.3 F | WEIGHT: 118 LBS | BODY MASS INDEX: 21.71 KG/M2

## 2024-08-30 DIAGNOSIS — M54.81 BILATERAL OCCIPITAL NEURALGIA: Primary | ICD-10-CM

## 2024-08-30 RX ORDER — BUPIVACAINE HYDROCHLORIDE 2.5 MG/ML
4 INJECTION, SOLUTION EPIDURAL; INFILTRATION; INTRACAUDAL ONCE
Status: COMPLETED | OUTPATIENT
Start: 2024-08-30 | End: 2024-08-30

## 2024-08-30 RX ADMIN — BUPIVACAINE HYDROCHLORIDE 10 MG: 2.5 INJECTION, SOLUTION EPIDURAL; INFILTRATION; INTRACAUDAL at 15:45

## 2024-09-04 ENCOUNTER — APPOINTMENT (OUTPATIENT)
Dept: NEUROLOGY | Facility: CLINIC | Age: 59
End: 2024-09-04

## 2024-09-04 VITALS
HEART RATE: 98 BPM | SYSTOLIC BLOOD PRESSURE: 135 MMHG | WEIGHT: 115.6 LBS | DIASTOLIC BLOOD PRESSURE: 84 MMHG | TEMPERATURE: 97.9 F

## 2024-09-04 DIAGNOSIS — G44.40 MEDICATION OVERUSE HEADACHE: ICD-10-CM

## 2024-09-04 DIAGNOSIS — M15.9 OSTEOARTHRITIS OF MULTIPLE JOINTS, UNSPECIFIED OSTEOARTHRITIS TYPE: ICD-10-CM

## 2024-09-04 DIAGNOSIS — F17.210 LIGHT SMOKER: ICD-10-CM

## 2024-09-04 DIAGNOSIS — G43.109 MIGRAINE WITH AURA AND WITHOUT STATUS MIGRAINOSUS, NOT INTRACTABLE: Primary | ICD-10-CM

## 2024-09-04 PROCEDURE — 99205 OFFICE O/P NEW HI 60 MIN: CPT

## 2024-09-04 RX ORDER — ZOLMITRIPTAN 5 MG/1
1 TABLET, FILM COATED ORAL AS NEEDED
COMMUNITY
Start: 2024-06-17

## 2024-09-04 RX ORDER — PLECANATIDE 3 MG/1
1 TABLET ORAL
COMMUNITY
Start: 2024-06-20

## 2024-09-04 RX ORDER — PROMETHAZINE HYDROCHLORIDE 25 MG/1
25 TABLET ORAL
COMMUNITY
Start: 2024-07-26 | End: 2024-10-24

## 2024-09-04 RX ORDER — HYDROCHLOROTHIAZIDE 12.5 MG/1
1 TABLET ORAL DAILY
COMMUNITY
Start: 2023-08-25

## 2024-09-04 RX ORDER — UBROGEPANT 100 MG/1
100 TABLET ORAL
COMMUNITY
Start: 2024-07-26

## 2024-09-04 RX ORDER — CYCLOBENZAPRINE HCL 10 MG
10 TABLET ORAL
COMMUNITY
Start: 2023-10-03

## 2024-09-04 RX ORDER — PANTOPRAZOLE SODIUM 40 MG/1
1 TABLET, DELAYED RELEASE ORAL
COMMUNITY
Start: 2024-06-11

## 2024-09-04 RX ORDER — METHYLPREDNISOLONE 4 MG/1
TABLET ORAL
Qty: 21 TABLET | Refills: 0 | Status: SHIPPED | OUTPATIENT
Start: 2024-09-04 | End: 2024-09-11

## 2024-09-04 RX ORDER — ESTRADIOL 0.05 MG/D
FILM, EXTENDED RELEASE TRANSDERMAL
COMMUNITY
Start: 2024-08-29

## 2024-09-04 RX ORDER — PROPRANOLOL HYDROCHLORIDE 20 MG/1
1 TABLET ORAL 2 TIMES DAILY
COMMUNITY
Start: 2023-11-27

## 2024-09-04 ASSESSMENT — PATIENT HEALTH QUESTIONNAIRE - PHQ9
2. FEELING DOWN, DEPRESSED OR HOPELESS: NOT AT ALL
SUM OF ALL RESPONSES TO PHQ9 QUESTIONS 1 AND 2: 0
1. LITTLE INTEREST OR PLEASURE IN DOING THINGS: NOT AT ALL

## 2024-09-04 ASSESSMENT — PAIN SCALES - GENERAL: PAINLEVEL: 2
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
## 2024-09-04 NOTE — PATIENT INSTRUCTIONS
Dear Stephanie,    Thank you for coming to [facility]. It was a pleasure caring for you today.  The best way to contact me for medication refills or questions about your care is through [email or web address].  Otherwise, you can contact the office by phone: [phone].    Romana Huerta, APRN-CNP    *For your osteoarthritis, it may be worth looking into Arthrotec (Celebrex/ misoprostol). Check with ortho/ PCP.     CGRP (calcitonin gene-related peptide) is a molecule in your brain that appears to be related to neurogenic inflammation and pain transmission in people who suffer from migraines.  There are 2 oral anti-CGRP medications available for acute migraine treatment: Nurtec and Ubrelvy. These are small molecule CRGP antagonists.         Ubrelvy is taken orally,  mg per dose, may be repeated if still having symptoms after 2 hours. No more than 200 mg in 24 hours. This rarely causes side effects including drowsiness, nausea, or dry mouth.         Migraines:  Suggestions for preventing or controlling migraines:  Riboflavin (vitamin B2) 200 mg twice a day may be helpful. Side effects can include diarrhea, increased urination and bright yellow urine. This should not be taken by kids.   CoQ10 100 mg daily up to three times per day may also be helpful. Side effects can include nausea, diarrhea, and dyspepsia.   Magnesium (oxelate) 400- 600 mg daily for prevention. Magnesium can also help with menstrual migraines. Side effects can include diarrhea and flushing.   According to the American Academy of Neurology, there is not sufficient evidence that melatonin helps prevent or treat migraines, so it is not currently recommended for this use. Butterbur is also not currently recommended for migraine prevention as it can cause liver toxicity.   Avoid triggers that can cause or worsen migraines (food, lack of sleep, stress, etc.)  Headache frequency is noted to be increased in those with low physical  activity, poor sleep, high BMI, smoking, and caffeine overuse. Managing stress with relaxation techniques and getting 20-30 minutes of aerobic exercise at least 4 times per week can help decrease headache frequency and intensity.   Keep a diary of your headaches to note triggers, how often you get them, how long they last, associated symptoms, what medicines you took and if they helped, and any other helpful information   Avoid taking rescue medication (NOT preventative medication) more than 3 days a week (includes both prescription and over the counter meds)  Take your preventative medication as directed. Let me know if you have side effects or problems with the medication. Do not suddenly stop the medication.     Medication Overuse Headaches:  Medication-overuse headache may occur in people who have frequent migraine, cluster, or tension-type headaches, which leads them to overuse pain medications. A vicious cycle occurs in which frequent headaches cause the person to take medication frequently (often non-prescription medication), which then causes a rebound headache as the medication wears off, causing more medication use, and so on. Medication overuse headache can even occur if a person is taking daily analgesics to treat other areas of pain, as the body does not care why the person is taking the medication. This headache from frequent medication use is a result of the body's dependence on the medication.    Medication overuse headache: Using combination analgesics, opioids or triptans >= 10 days/month, or acetaminophen/ or NSAIDs >= 15 days/month        Overuse of any number of pain medications can increase the risk of developing medication-overuse headaches. To avoid medication-overuse headaches, we recommend the following:  If possible, avoid medications that contain butalbital (sample brand name: Fioricet) and opioids completely.  Do not use triptans or aspirin/acetaminophen/caffeine combinations (sample  brand name: Excedrin) more than nine days per month.  Do not use nonsteroidal antiinflammatory drugs (NSAIDs) more than 14 days per month.      *If you are having difficulty breaking the cycle of medication overuse headaches, please message me through B[facility]IN or call the office.

## 2024-09-06 PROBLEM — G43.109 MIGRAINE WITH AURA AND WITHOUT STATUS MIGRAINOSUS, NOT INTRACTABLE: Status: ACTIVE | Noted: 2024-09-06

## 2024-09-06 PROBLEM — G44.40 MEDICATION OVERUSE HEADACHE: Status: ACTIVE | Noted: 2024-09-06

## 2024-09-06 PROBLEM — F17.210 LIGHT SMOKER: Status: ACTIVE | Noted: 2024-09-06

## 2024-09-06 NOTE — ASSESSMENT & PLAN NOTE
Medrol Dosepak to break migraine cycle from medication overuse. Do not use NSAIDS while taking steroids.  Orders:    methylPREDNISolone (Medrol Dospak) 4 mg tablets; Follow schedule on package instructions

## 2024-09-06 NOTE — ASSESSMENT & PLAN NOTE
Continue with Botox every 90 days with Dr. Mendez, take Ubrelvy at the onset of Aura/ migraine, repeat in 2 hours.   Continue appts with dr. Mendez  Follow up 6 months.    Orders:    methylPREDNISolone (Medrol Dospak) 4 mg tablets; Follow schedule on package instructions

## 2024-09-09 ENCOUNTER — OFFICE VISIT (OUTPATIENT)
Dept: PHYSICAL MEDICINE AND REHAB | Age: 59
End: 2024-09-09
Payer: COMMERCIAL

## 2024-09-09 VITALS
DIASTOLIC BLOOD PRESSURE: 80 MMHG | SYSTOLIC BLOOD PRESSURE: 130 MMHG | BODY MASS INDEX: 21.35 KG/M2 | HEIGHT: 62 IN | HEART RATE: 77 BPM | WEIGHT: 116 LBS

## 2024-09-09 DIAGNOSIS — G43.709 CHRONIC MIGRAINE WITHOUT AURA WITHOUT STATUS MIGRAINOSUS, NOT INTRACTABLE: Primary | ICD-10-CM

## 2024-09-09 PROCEDURE — 99214 OFFICE O/P EST MOD 30 MIN: CPT | Performed by: PHYSICAL MEDICINE & REHABILITATION

## 2024-09-09 RX ORDER — CYCLOBENZAPRINE HCL 10 MG
10 TABLET ORAL 2 TIMES DAILY PRN
Qty: 180 TABLET | Refills: 3 | Status: SHIPPED | OUTPATIENT
Start: 2024-09-09

## 2024-09-09 RX ORDER — ZOLMITRIPTAN 5 MG/1
TABLET, FILM COATED ORAL
Qty: 12 TABLET | Refills: 5 | Status: SHIPPED | OUTPATIENT
Start: 2024-09-09

## 2024-09-09 NOTE — PROGRESS NOTES
Solange Mason D.O.  [facility]  [address] [place] [address]  Phone: [phone]  Fax: [phone]    10/2/2024    Chief Complaint   Patient presents with    Neck Pain       Last injection:8/30/2024  Taking anticoagulants/antiplatelets: No  Diabetic: No  Febrile/active infection: No    Ambulatory Procedure Time Out  Correct Patient: Yes  Correct Procedure: Yes  Correct Site/Side: Yes  Correct Site(s) Marked: Yes  Informed Consent Signed: Yes  Allergies Verified: Yes  Staff Present & Credential:: Rody Carbajal/    Diagnosis:  1. Bilateral occipital neuralgia    2. Chronic migraine without aura without status migrainosus, not intractable            After explaining the indications, risks, benefits and alternatives of a Bilateral occipital nerve block, the patient agreed to proceed.  A permit was signed and scanned into the media. The patient was placed in the seated position. The skin was prepared with alcohol.  Using an aseptic, no touch technique, a 25 gauge, 5/8\" needle with 2 cc of Bupivicaine 0.25% was directed to the occipital notch at the point of maximal tenderness. After negative aspiration, the medication was injected was injected.    Adequate hemostasis was obtained. The patient tolerated the procedure well and was educated in post injection care.  The patient was monitored clinically and left the office without incident. Pain was reduced post-injection.       Solange Mason D.O., P.T.  Board Certified Physical Medicine and Rehabilitation  Board Certified Electrodiagnostic Medicine    Administrations This Visit       BUPivacaine (PF) (MARCAINE) 0.25 % injection 75 mg       Admin Date  09/30/2024  15:36 Action  Given Dose  75 mg Route  IntraDERmal Site  Other Documented By  Barby Hamilton LPN    NDC: 6359-6445-02    Lot#: [id number]    : HOSPIRA    Patient Supplied?: No

## 2024-09-16 ENCOUNTER — OFFICE VISIT (OUTPATIENT)
Dept: SLEEP CENTER | Age: 59
End: 2024-09-16
Payer: COMMERCIAL

## 2024-09-16 VITALS
RESPIRATION RATE: 16 BRPM | HEART RATE: 95 BPM | DIASTOLIC BLOOD PRESSURE: 80 MMHG | SYSTOLIC BLOOD PRESSURE: 134 MMHG | OXYGEN SATURATION: 97 %

## 2024-09-16 DIAGNOSIS — G47.33 OBSTRUCTIVE SLEEP APNEA: Primary | ICD-10-CM

## 2024-09-16 PROCEDURE — 99214 OFFICE O/P EST MOD 30 MIN: CPT | Performed by: NURSE PRACTITIONER

## 2024-09-16 ASSESSMENT — SLEEP AND FATIGUE QUESTIONNAIRES
HOW LIKELY ARE YOU TO NOD OFF OR FALL ASLEEP WHEN YOU ARE A PASSENGER IN A CAR FOR AN HOUR WITHOUT A BREAK: MODERATE CHANCE OF DOZING
HOW LIKELY ARE YOU TO NOD OFF OR FALL ASLEEP WHILE LYING DOWN TO REST IN THE AFTERNOON WHEN CIRCUMSTANCES PERMIT: SLIGHT CHANCE OF DOZING
HOW LIKELY ARE YOU TO NOD OFF OR FALL ASLEEP WHILE SITTING AND READING: SLIGHT CHANCE OF DOZING
HOW LIKELY ARE YOU TO NOD OFF OR FALL ASLEEP WHILE SITTING QUIETLY AFTER LUNCH WITHOUT ALCOHOL: SLIGHT CHANCE OF DOZING
HOW LIKELY ARE YOU TO NOD OFF OR FALL ASLEEP WHILE SITTING INACTIVE IN A PUBLIC PLACE: WOULD NEVER DOZE
HOW LIKELY ARE YOU TO NOD OFF OR FALL ASLEEP IN A CAR, WHILE STOPPED FOR A FEW MINUTES IN TRAFFIC: WOULD NEVER DOZE
HOW LIKELY ARE YOU TO NOD OFF OR FALL ASLEEP WHILE SITTING AND TALKING TO SOMEONE: WOULD NEVER DOZE
HOW LIKELY ARE YOU TO NOD OFF OR FALL ASLEEP WHILE WATCHING TV: WOULD NEVER DOZE
ESS TOTAL SCORE: 5

## 2024-09-30 ENCOUNTER — OFFICE VISIT (OUTPATIENT)
Dept: PHYSICAL MEDICINE AND REHAB | Age: 59
End: 2024-09-30
Payer: COMMERCIAL

## 2024-09-30 VITALS
WEIGHT: 117 LBS | BODY MASS INDEX: 21.4 KG/M2 | DIASTOLIC BLOOD PRESSURE: 90 MMHG | TEMPERATURE: 97 F | SYSTOLIC BLOOD PRESSURE: 144 MMHG | HEART RATE: 97 BPM

## 2024-09-30 DIAGNOSIS — G43.709 CHRONIC MIGRAINE WITHOUT AURA WITHOUT STATUS MIGRAINOSUS, NOT INTRACTABLE: ICD-10-CM

## 2024-09-30 DIAGNOSIS — M54.81 BILATERAL OCCIPITAL NEURALGIA: Primary | ICD-10-CM

## 2024-09-30 PROCEDURE — 64450 NJX AA&/STRD OTHER PN/BRANCH: CPT | Performed by: PHYSICAL MEDICINE & REHABILITATION

## 2024-09-30 RX ORDER — PROMETHAZINE HYDROCHLORIDE 25 MG/1
25 TABLET ORAL 4 TIMES DAILY PRN
Qty: 60 TABLET | Refills: 5 | Status: SHIPPED | OUTPATIENT
Start: 2024-09-30 | End: 2024-12-29

## 2024-09-30 RX ORDER — BUPIVACAINE HYDROCHLORIDE 2.5 MG/ML
30 INJECTION, SOLUTION EPIDURAL; INFILTRATION; INTRACAUDAL ONCE
Status: COMPLETED | OUTPATIENT
Start: 2024-09-30 | End: 2024-09-30

## 2024-09-30 RX ADMIN — BUPIVACAINE HYDROCHLORIDE 75 MG: 2.5 INJECTION, SOLUTION EPIDURAL; INFILTRATION; INTRACAUDAL at 15:36

## 2024-10-01 ENCOUNTER — PATIENT MESSAGE (OUTPATIENT)
Dept: PHYSICAL MEDICINE AND REHAB | Age: 59
End: 2024-10-01

## 2024-10-17 NOTE — TELEPHONE ENCOUNTER
ATTEMPTED TO CONTACT PT(PATIENT)      NO ANSWER      LEFT VOICEMAIL WITH INSTRUCTIONS TO RETURN CALL TO OFFICE AT [phone]   yes

## 2024-10-18 ENCOUNTER — OFFICE VISIT (OUTPATIENT)
Dept: PHYSICAL MEDICINE AND REHAB | Age: 59
End: 2024-10-18
Payer: COMMERCIAL

## 2024-10-18 ENCOUNTER — TELEPHONE (OUTPATIENT)
Dept: PHYSICAL MEDICINE AND REHAB | Age: 59
End: 2024-10-18

## 2024-10-18 DIAGNOSIS — G43.709 CHRONIC MIGRAINE WITHOUT AURA WITHOUT STATUS MIGRAINOSUS, NOT INTRACTABLE: Primary | ICD-10-CM

## 2024-10-18 PROCEDURE — 99211 OFF/OP EST MAY X REQ PHY/QHP: CPT | Performed by: PHYSICAL MEDICINE & REHABILITATION

## 2024-10-21 ENCOUNTER — PATIENT MESSAGE (OUTPATIENT)
Dept: PHYSICAL MEDICINE AND REHAB | Age: 59
End: 2024-10-21

## 2024-10-23 RX ORDER — KETOROLAC TROMETHAMINE 15 MG/ML
15 INJECTION, SOLUTION INTRAMUSCULAR; INTRAVENOUS ONCE
Status: COMPLETED | OUTPATIENT
Start: 2024-10-23 | End: 2024-10-24

## 2024-10-24 PROCEDURE — 96372 THER/PROPH/DIAG INJ SC/IM: CPT | Performed by: PHYSICAL MEDICINE & REHABILITATION

## 2024-10-24 RX ADMIN — KETOROLAC TROMETHAMINE 15 MG: 15 INJECTION, SOLUTION INTRAMUSCULAR; INTRAVENOUS at 07:57

## 2024-10-29 NOTE — PROGRESS NOTES
Action  Given Dose  15 mg Route  IntraMUSCular Site  Deltoid Left Documented By  Darling Neri MA    NDC: 1878-4457-45    Lot#: [id number]    : HOSPIRA    Patient Supplied?: No

## 2024-10-29 NOTE — PROGRESS NOTES
Solange Mason D.O.  [facility]  [address] [address]  Phone: [phone]  Fax: [phone]    11/6/2024    Chief Complaint   Patient presents with    Migraine     Botox 200 units       Informed Consent:  The indications, risks, benefits, and  alternatives of onabotulinum toxin A were discussed with the patient. I explained to the patient the potential side effects including but not limited to: distant spread of toxin effect causing droopy eyelids, facial drooping, neck pain, headache, double vision, muscle pain/spasm/weakness/stiffness,  bronchitis,  injection site pain, increased blood pressure, hypersensitivity, anaphylaxis, difficulty swallowing, difficulty speaking, urinary incontinence and breathing difficulty.  The patient is aware that swallowing and breathing difficulties can be life threatening and there have been reports of death in some cases where onabotulinum toxin was injected.   The patient was advised of the expected benefit to include less headache days per month, and the anticipated duration of effect of 3 months. A permit was signed and scanned into the media.    Last injection: 07/26/2024  Taking anticoagulants/antiplatelets: No  Diabetic: No  Febrile/active infection: No    Ambulatory Procedure Time Out  Correct Patient: Yes  Correct Procedure: Yes  Correct Site/Side: Yes  Correct Site(s) Marked: Yes  Informed Consent Signed: Yes  Allergies Verified: Yes  Staff Present & Credential:: Barby Hamilton LPN/ Solange Mason    Diagnosis:  1. Intractable chronic migraine with aura and without status migrainosus          Procedure note: After obtaining verbal and written consent from the patient for injection of  onabotulinum toxin A the patient agreed to proceed.    200 units of onabotulinum toxin A was reconstituted using 4 cc of preservative free normal saline ( 5 units per 0.1 ml).  The medication was injected using a 30 g 0.5\" needle

## 2024-11-06 ENCOUNTER — OFFICE VISIT (OUTPATIENT)
Dept: PHYSICAL MEDICINE AND REHAB | Age: 59
End: 2024-11-06

## 2024-11-06 VITALS
WEIGHT: 119 LBS | SYSTOLIC BLOOD PRESSURE: 140 MMHG | HEART RATE: 108 BPM | HEIGHT: 62 IN | DIASTOLIC BLOOD PRESSURE: 80 MMHG | TEMPERATURE: 97.3 F | BODY MASS INDEX: 21.9 KG/M2

## 2024-11-06 DIAGNOSIS — G43.E19 INTRACTABLE CHRONIC MIGRAINE WITH AURA AND WITHOUT STATUS MIGRAINOSUS: Primary | ICD-10-CM

## 2024-11-06 RX ORDER — KETOROLAC TROMETHAMINE 15 MG/ML
15 INJECTION, SOLUTION INTRAMUSCULAR; INTRAVENOUS ONCE
Status: COMPLETED | OUTPATIENT
Start: 2024-11-06 | End: 2024-11-06

## 2024-11-06 RX ORDER — TENAPANOR HYDROCHLORIDE 53.2 MG/1
5 TABLET ORAL DAILY
COMMUNITY

## 2024-11-06 RX ADMIN — KETOROLAC TROMETHAMINE 15 MG: 15 INJECTION, SOLUTION INTRAMUSCULAR; INTRAVENOUS at 15:35

## 2024-11-13 ENCOUNTER — OFFICE VISIT (OUTPATIENT)
Dept: PHYSICAL MEDICINE AND REHAB | Age: 59
End: 2024-11-13
Payer: COMMERCIAL

## 2024-11-13 VITALS
HEIGHT: 62 IN | WEIGHT: 119 LBS | TEMPERATURE: 97.1 F | HEART RATE: 85 BPM | DIASTOLIC BLOOD PRESSURE: 88 MMHG | SYSTOLIC BLOOD PRESSURE: 130 MMHG | BODY MASS INDEX: 21.9 KG/M2

## 2024-11-13 DIAGNOSIS — M54.81 BILATERAL OCCIPITAL NEURALGIA: Primary | ICD-10-CM

## 2024-11-13 PROCEDURE — 64405 NJX AA&/STRD GR OCPL NRV: CPT | Performed by: PHYSICAL MEDICINE & REHABILITATION

## 2024-11-13 RX ORDER — BUPIVACAINE HYDROCHLORIDE 2.5 MG/ML
4 INJECTION, SOLUTION INFILTRATION; PERINEURAL ONCE
Status: COMPLETED | OUTPATIENT
Start: 2024-11-13 | End: 2024-11-14

## 2024-11-13 RX ORDER — KETOROLAC TROMETHAMINE 15 MG/ML
15 INJECTION, SOLUTION INTRAMUSCULAR; INTRAVENOUS ONCE
Status: COMPLETED | OUTPATIENT
Start: 2024-11-13 | End: 2024-11-14

## 2024-11-14 PROCEDURE — 96372 THER/PROPH/DIAG INJ SC/IM: CPT | Performed by: PHYSICAL MEDICINE & REHABILITATION

## 2024-11-14 RX ADMIN — BUPIVACAINE HYDROCHLORIDE 10 MG: 2.5 INJECTION, SOLUTION INFILTRATION; PERINEURAL at 07:42

## 2024-11-14 RX ADMIN — KETOROLAC TROMETHAMINE 15 MG: 15 INJECTION, SOLUTION INTRAMUSCULAR; INTRAVENOUS at 07:40

## 2024-12-17 NOTE — PROGRESS NOTES
Solange Maosn D.O.  [facility]  [address] [address]  Phone: [phone]  Fax: [phone]    12/17/2024    No chief complaint on file.      Last injection:09/30/2024  Taking anticoagulants/antiplatelets: No  Diabetic: No  Febrile/active infection: No         Diagnosis:  No diagnosis found.            After explaining the indications, risks, benefits and alternatives of a Bilateral occipital nerve block, the patient agreed to proceed.  A permit was signed and scanned into the media. The patient was placed in the seated position. The skin was prepared with alcohol.  Using an aseptic, no touch technique, a 25 gauge, 5/8\" needle with 2 cc of Bupivicaine 0.25% was directed to the occipital notch at the point of maximal tenderness. After negative aspiration, the medication was injected was injected.    Adequate hemostasis was obtained. The patient tolerated the procedure well and was educated in post injection care.  The patient was monitored clinically and left the office without incident. Pain was reduced post-injection.       Solange Mason D.O., P.T.  Board Certified Physical Medicine and Rehabilitation  Board Certified Electrodiagnostic Medicine

## 2024-12-18 ENCOUNTER — OFFICE VISIT (OUTPATIENT)
Dept: PHYSICAL MEDICINE AND REHAB | Age: 59
End: 2024-12-18

## 2024-12-18 VITALS
WEIGHT: 116 LBS | BODY MASS INDEX: 21.35 KG/M2 | SYSTOLIC BLOOD PRESSURE: 128 MMHG | TEMPERATURE: 97.2 F | HEIGHT: 62 IN | HEART RATE: 98 BPM | DIASTOLIC BLOOD PRESSURE: 80 MMHG

## 2024-12-18 DIAGNOSIS — M54.81 BILATERAL OCCIPITAL NEURALGIA: Primary | ICD-10-CM

## 2024-12-20 RX ORDER — BUPIVACAINE HYDROCHLORIDE 2.5 MG/ML
30 INJECTION, SOLUTION INFILTRATION; PERINEURAL ONCE
Status: SHIPPED | OUTPATIENT
Start: 2024-12-20

## 2024-12-30 DIAGNOSIS — G43.709 CHRONIC MIGRAINE WITHOUT AURA WITHOUT STATUS MIGRAINOSUS, NOT INTRACTABLE: ICD-10-CM

## 2024-12-30 RX ORDER — PROMETHAZINE HYDROCHLORIDE 25 MG/1
25 TABLET ORAL 4 TIMES DAILY PRN
Qty: 60 TABLET | Refills: 1 | OUTPATIENT
Start: 2024-12-30 | End: 2025-03-30

## 2024-12-30 NOTE — TELEPHONE ENCOUNTER
Called and spoke to [place] pharmacist [facility] and phoned in promethazine 25mg 1 tablet by mouth 4 times daily as needed for nausea, quantity 60 with 1 refill

## 2025-01-08 RX ORDER — BUPIVACAINE HYDROCHLORIDE 2.5 MG/ML
30 INJECTION, SOLUTION INFILTRATION; PERINEURAL ONCE
Status: CANCELLED | OUTPATIENT
Start: 2025-01-08 | End: 2025-01-08

## 2025-01-08 NOTE — PROGRESS NOTES
Solange Mason D.O.  [facility]  [address] [place] [address]  Phone: [phone]  Fax: [phone]    1/10/2025    Chief Complaint   Patient presents with    Headache     Occipital nerve block       Last injection:12/18/2024  Taking anticoagulants/antiplatelets: No  Diabetic: No  Febrile/active infection: No    Ambulatory Procedure Time Out  Correct Patient: Yes  Correct Procedure: Yes  Correct Site/Side: Yes  Correct Site(s) Marked: Yes  Informed Consent Signed: Yes  Allergies Verified: Yes  Staff Present & Credential:: Barby Hamilton LPN/ Dr. Solange Mason    Diagnosis:  1. Bilateral occipital neuralgia    2. Trigger point    3. Intractable chronic migraine with aura and without status migrainosus      After explaining the indications, risks, benefits and alternatives of a Bilateral occipital nerve block, the patient agreed to proceed.  A permit was signed and scanned into the media. The patient was placed in the seated position. The skin was prepared with alcohol.  Using an aseptic, no touch technique, a 25 gauge, 5/8\" needle with 2 cc of Bupivicaine 0.25% was directed to the occipital notch at the point of maximal tenderness. After negative aspiration, the medication was injected was injected.    Adequate hemostasis was obtained. The patient tolerated the procedure well and was educated in post injection care.  The patient was monitored clinically and left the office without incident. Pain was reduced post-injection.       Solange Mason D.O., P.T.  Board Certified Physical Medicine and Rehabilitation  Board Certified Electrodiagnostic Medicine    Administrations This Visit       BUPivacaine (MARCAINE) 0.25 % injection 10 mg       Admin Date  01/10/2025  16:25 Action  Given Dose  10 mg Route  SubCUTAneous Site  Other Documented By  Rody Ambriz RN    NDC: 2237-7480-14    Lot#: [id number]    : HOSPIRA    Patient Supplied?: No

## 2025-01-10 ENCOUNTER — OFFICE VISIT (OUTPATIENT)
Dept: PHYSICAL MEDICINE AND REHAB | Age: 60
End: 2025-01-10

## 2025-01-10 VITALS
BODY MASS INDEX: 22.26 KG/M2 | DIASTOLIC BLOOD PRESSURE: 94 MMHG | TEMPERATURE: 97.5 F | HEART RATE: 88 BPM | SYSTOLIC BLOOD PRESSURE: 144 MMHG | WEIGHT: 121 LBS | HEIGHT: 62 IN

## 2025-01-10 DIAGNOSIS — M54.81 BILATERAL OCCIPITAL NEURALGIA: Primary | ICD-10-CM

## 2025-01-10 DIAGNOSIS — M79.10 TRIGGER POINT: ICD-10-CM

## 2025-01-10 DIAGNOSIS — G43.E19 INTRACTABLE CHRONIC MIGRAINE WITH AURA AND WITHOUT STATUS MIGRAINOSUS: ICD-10-CM

## 2025-01-10 RX ORDER — KETOROLAC TROMETHAMINE 10 MG/1
10 TABLET, FILM COATED ORAL EVERY 6 HOURS
Qty: 12 TABLET | Refills: 0 | Status: SHIPPED | OUTPATIENT
Start: 2025-01-10 | End: 2025-01-13

## 2025-01-10 RX ORDER — KETOROLAC TROMETHAMINE 15 MG/ML
15 INJECTION, SOLUTION INTRAMUSCULAR; INTRAVENOUS ONCE
Status: COMPLETED | OUTPATIENT
Start: 2025-01-10 | End: 2025-01-10

## 2025-01-10 RX ORDER — BUPIVACAINE HYDROCHLORIDE 2.5 MG/ML
4 INJECTION, SOLUTION INFILTRATION; PERINEURAL ONCE
Status: COMPLETED | OUTPATIENT
Start: 2025-01-10 | End: 2025-01-10

## 2025-01-10 RX ORDER — RIMEGEPANT SULFATE 75 MG/75MG
75 TABLET, ORALLY DISINTEGRATING ORAL EVERY OTHER DAY
Qty: 16 TABLET | Refills: 11 | Status: SHIPPED | OUTPATIENT
Start: 2025-01-10 | End: 2025-02-09

## 2025-01-10 RX ADMIN — KETOROLAC TROMETHAMINE 15 MG: 15 INJECTION, SOLUTION INTRAMUSCULAR; INTRAVENOUS at 16:30

## 2025-01-10 RX ADMIN — BUPIVACAINE HYDROCHLORIDE 10 MG: 2.5 INJECTION, SOLUTION INFILTRATION; PERINEURAL at 16:25

## 2025-01-13 NOTE — PROGRESS NOTES
Solange Mason D.O.  [facility]  [address] [place] [address]  Phone: [phone]  Fax: [phone]    2/7/2025    Chief Complaint   Patient presents with    Headache    Neck Pain     Occipital nerve block       Last injection:01/10/25  Taking anticoagulants/antiplatelets: No  Diabetic: No  Febrile/active infection: No    Ambulatory Procedure Time Out  Correct Patient: Yes  Correct Procedure: Yes  Correct Site/Side: Yes  Correct Site(s) Marked: Yes  Informed Consent Signed: Yes  Allergies Verified: Yes  Staff Present & Credential:: BECKI Maciel,     Diagnosis:  1. Bilateral occipital neuralgia    2. Chronic migraine without aura without status migrainosus, not intractable    3. Intractable chronic migraine with aura and without status migrainosus        After explaining the indications, risks, benefits and alternatives of a Bilateral occipital nerve block, the patient agreed to proceed.  A permit was signed and scanned into the media. The patient was placed in the seated position. The skin was prepared with alcohol.  Using an aseptic, no touch technique, a 25 gauge, 5/8\" needle with 2 cc of Bupivicaine 0.25% was directed to the occipital notch at the point of maximal tenderness. After negative aspiration, the medication was injected was injected.    Adequate hemostasis was obtained. The patient tolerated the procedure well and was educated in post injection care.  The patient was monitored clinically and left the office without incident. Pain was reduced post-injection.       Solange Mason D.O., P.T.  Board Certified Physical Medicine and Rehabilitation  Board Certified Electrodiagnostic Medicine    Administrations This Visit       BUPivacaine (PF) (MARCAINE) 0.25 % injection 75 mg       Admin Date  02/07/2025  15:53 Action  Given Dose  75 mg Route  IntraDERmal Site  Other Documented By  Barby Hamitlon LPN    NDC: 7816-3308-86

## 2025-01-13 NOTE — PROGRESS NOTES
Solange Mason D.O.  [facility]  [address] [place] [address]  Phone: [phone]  Fax: [phone]    2/14/2025    Chief Complaint   Patient presents with    Migraine     Botox 200 units migraine       Informed Consent:  The indications, risks, benefits, and  alternatives of onabotulinum toxin A were discussed with the patient. I explained to the patient the potential side effects including but not limited to: distant spread of toxin effect causing droopy eyelids, facial drooping, neck pain, headache, double vision, muscle pain/spasm/weakness/stiffness,  bronchitis,  injection site pain, increased blood pressure, hypersensitivity, anaphylaxis, difficulty swallowing, difficulty speaking, urinary incontinence and breathing difficulty.  The patient is aware that swallowing and breathing difficulties can be life threatening and there have been reports of death in some cases where onabotulinum toxin was injected.   The patient was advised of the expected benefit to include less headache days per month, and the anticipated duration of effect of 3 months. A permit was signed and scanned into the media.    Last injection: 11/06/2024  Taking anticoagulants/antiplatelets: No  Diabetic: No  Febrile/active infection: No    Ambulatory Procedure Time Out  Correct Patient: Yes  Correct Procedure: Yes  Correct Site/Side: Yes  Correct Site(s) Marked: Yes  Informed Consent Signed: Yes  Allergies Verified: Yes  Staff Present & Credential:: Rody Kahn/    Diagnosis:  1. Intractable chronic migraine with aura and without status migrainosus            Procedure note: After obtaining verbal and written consent from the patient for injection of  onabotulinum toxin A the patient agreed to proceed.    200 units of onabotulinum toxin A was reconstituted using 4 cc of preservative free normal saline ( 5 units per 0.1 ml).  The medication was injected using a 30 g 0.5\"

## 2025-01-17 ENCOUNTER — OFFICE VISIT (OUTPATIENT)
Dept: PHYSICAL MEDICINE AND REHAB | Age: 60
End: 2025-01-17
Payer: COMMERCIAL

## 2025-01-17 DIAGNOSIS — G43.709 CHRONIC MIGRAINE WITHOUT AURA WITHOUT STATUS MIGRAINOSUS, NOT INTRACTABLE: Primary | ICD-10-CM

## 2025-01-17 PROCEDURE — 99211 OFF/OP EST MAY X REQ PHY/QHP: CPT | Performed by: PHYSICAL MEDICINE & REHABILITATION

## 2025-01-17 RX ORDER — KETOROLAC TROMETHAMINE 15 MG/ML
15 INJECTION, SOLUTION INTRAMUSCULAR; INTRAVENOUS ONCE
Status: SHIPPED | OUTPATIENT
Start: 2025-01-17

## 2025-02-04 DIAGNOSIS — G43.709 CHRONIC MIGRAINE WITHOUT AURA WITHOUT STATUS MIGRAINOSUS, NOT INTRACTABLE: ICD-10-CM

## 2025-02-04 NOTE — TELEPHONE ENCOUNTER
Patient sent mychart refill request for phenergan 25 mg last sent 12-30-24 #60 1 refill.  Please advise

## 2025-02-05 RX ORDER — PROMETHAZINE HYDROCHLORIDE 25 MG/1
25 TABLET ORAL 4 TIMES DAILY PRN
Qty: 60 TABLET | Refills: 1 | Status: SHIPPED | OUTPATIENT
Start: 2025-02-05 | End: 2025-05-06

## 2025-02-07 ENCOUNTER — OFFICE VISIT (OUTPATIENT)
Dept: PHYSICAL MEDICINE AND REHAB | Age: 60
End: 2025-02-07

## 2025-02-07 VITALS
WEIGHT: 123 LBS | BODY MASS INDEX: 22.63 KG/M2 | SYSTOLIC BLOOD PRESSURE: 156 MMHG | HEIGHT: 62 IN | DIASTOLIC BLOOD PRESSURE: 91 MMHG | HEART RATE: 102 BPM

## 2025-02-07 DIAGNOSIS — M54.81 BILATERAL OCCIPITAL NEURALGIA: Primary | ICD-10-CM

## 2025-02-07 DIAGNOSIS — G43.E19 INTRACTABLE CHRONIC MIGRAINE WITH AURA AND WITHOUT STATUS MIGRAINOSUS: ICD-10-CM

## 2025-02-07 DIAGNOSIS — G43.709 CHRONIC MIGRAINE WITHOUT AURA WITHOUT STATUS MIGRAINOSUS, NOT INTRACTABLE: ICD-10-CM

## 2025-02-07 RX ORDER — PROPRANOLOL HYDROCHLORIDE 10 MG/1
10 TABLET ORAL 2 TIMES DAILY
COMMUNITY

## 2025-02-07 RX ORDER — GALCANEZUMAB 120 MG/ML
120 INJECTION, SOLUTION SUBCUTANEOUS
Qty: 1 ADJUSTABLE DOSE PRE-FILLED PEN SYRINGE | Refills: 11 | Status: SHIPPED | OUTPATIENT
Start: 2025-02-07

## 2025-02-07 RX ORDER — BUPIVACAINE HYDROCHLORIDE 2.5 MG/ML
30 INJECTION, SOLUTION EPIDURAL; INFILTRATION; INTRACAUDAL ONCE
Status: COMPLETED | OUTPATIENT
Start: 2025-02-07 | End: 2025-02-07

## 2025-02-07 RX ADMIN — BUPIVACAINE HYDROCHLORIDE 75 MG: 2.5 INJECTION, SOLUTION EPIDURAL; INFILTRATION; INTRACAUDAL at 15:53

## 2025-02-10 ENCOUNTER — OFFICE VISIT (OUTPATIENT)
Dept: PHYSICAL MEDICINE AND REHAB | Age: 60
End: 2025-02-10

## 2025-02-10 VITALS
BODY MASS INDEX: 22.5 KG/M2 | HEART RATE: 96 BPM | TEMPERATURE: 97.1 F | SYSTOLIC BLOOD PRESSURE: 149 MMHG | DIASTOLIC BLOOD PRESSURE: 87 MMHG | WEIGHT: 123 LBS

## 2025-02-10 DIAGNOSIS — G43.E19 INTRACTABLE CHRONIC MIGRAINE WITH AURA AND WITHOUT STATUS MIGRAINOSUS: Primary | ICD-10-CM

## 2025-02-10 RX ORDER — KETOROLAC TROMETHAMINE 10 MG/1
10 TABLET, FILM COATED ORAL EVERY 6 HOURS
Qty: 12 TABLET | Refills: 0 | Status: SHIPPED | OUTPATIENT
Start: 2025-02-10 | End: 2025-02-13

## 2025-02-10 RX ADMIN — KETOROLAC TROMETHAMINE 15 MG: 15 INJECTION, SOLUTION INTRAMUSCULAR; INTRAVENOUS at 16:07

## 2025-02-11 RX ORDER — KETOROLAC TROMETHAMINE 15 MG/ML
15 INJECTION, SOLUTION INTRAMUSCULAR; INTRAVENOUS ONCE
Status: COMPLETED | OUTPATIENT
Start: 2025-02-11 | End: 2025-02-10

## 2025-02-12 NOTE — PROGRESS NOTES
Solange Mason D.O.  [facility]  [address] [place] [address]  Phone: [phone]  Fax: [phone]    3/3/2025    Chief Complaint   Patient presents with    Neck Pain     Occipital nerve blocks       Last injection:01/10/25  Taking anticoagulants/antiplatelets: No  Diabetic: No  Febrile/active infection: No    Ambulatory Procedure Time Out  Correct Patient: Yes  Correct Procedure: Yes  Correct Site/Side: Yes  Correct Site(s) Marked: Yes  Informed Consent Signed: Yes  Allergies Verified: Yes  Staff Present & Credential:: BECKI Maciel DO    Diagnosis:  1. Bilateral occipital neuralgia          After explaining the indications, risks, benefits and alternatives of a Bilateral occipital nerve block, the patient agreed to proceed.  A permit was signed and scanned into the media. The patient was placed in the seated position. The skin was prepared with alcohol.  Using an aseptic, no touch technique, a 25 gauge, 5/8\" needle with 2 cc of Bupivicaine 0.25% was directed to the occipital notch at the point of maximal tenderness. After negative aspiration, the medication was injected was injected.    Adequate hemostasis was obtained. The patient tolerated the procedure well and was educated in post injection care.  The patient was monitored clinically and left the office without incident. Pain was reduced post-injection.       Solange Mason D.O., P.T.  Board Certified Physical Medicine and Rehabilitation  Board Certified Electrodiagnostic Medicine    Administrations This Visit       BUPivacaine (MARCAINE) 0.25 % injection 10 mg       Admin Date  03/03/2025  14:50 Action  Given Dose  10 mg Route  IntraDERmal Site  Other Documented By  Darling Neri MA    NDC: 3034-7783-35    Lot#: [id number]    : HOSPIRA    Patient Supplied?: No

## 2025-02-18 NOTE — PROGRESS NOTES
By  Darling Neri MA    NDC: 9144-9074-73    Lot#: [id number]    : HOSPIRA    Patient Supplied?: No              triamcinolone acetonide (KENALOG-40) injection 40 mg       Admin Date  02/26/2025  15:26 Action  Given Dose  40 mg Route  IntraMUSCular Site  Other Documented By  Darling Neri MA    NDC: 6405-6002-11    Lot#: [id number]    : [facility] [employer] [facility])    Patient Supplied?: No

## 2025-02-25 ENCOUNTER — PATIENT MESSAGE (OUTPATIENT)
Dept: PHYSICAL MEDICINE AND REHAB | Age: 60
End: 2025-02-25

## 2025-02-26 ENCOUNTER — OFFICE VISIT (OUTPATIENT)
Dept: PHYSICAL MEDICINE AND REHAB | Age: 60
End: 2025-02-26
Payer: COMMERCIAL

## 2025-02-26 ENCOUNTER — PATIENT MESSAGE (OUTPATIENT)
Dept: PHYSICAL MEDICINE AND REHAB | Age: 60
End: 2025-02-26

## 2025-02-26 VITALS
WEIGHT: 123 LBS | TEMPERATURE: 97.7 F | HEART RATE: 96 BPM | BODY MASS INDEX: 22.5 KG/M2 | DIASTOLIC BLOOD PRESSURE: 80 MMHG | SYSTOLIC BLOOD PRESSURE: 153 MMHG

## 2025-02-26 DIAGNOSIS — G43.709 CHRONIC MIGRAINE WITHOUT AURA WITHOUT STATUS MIGRAINOSUS, NOT INTRACTABLE: ICD-10-CM

## 2025-02-26 DIAGNOSIS — G56.01 CARPAL TUNNEL SYNDROME, RIGHT: ICD-10-CM

## 2025-02-26 DIAGNOSIS — M79.609 PAIN IN EXTREMITY, UNSPECIFIED EXTREMITY: Primary | ICD-10-CM

## 2025-02-26 PROCEDURE — 76942 ECHO GUIDE FOR BIOPSY: CPT | Performed by: PHYSICAL MEDICINE & REHABILITATION

## 2025-02-26 PROCEDURE — 20526 THER INJECTION CARP TUNNEL: CPT | Performed by: PHYSICAL MEDICINE & REHABILITATION

## 2025-02-26 RX ORDER — PROMETHAZINE HYDROCHLORIDE 25 MG/1
25 TABLET ORAL 4 TIMES DAILY PRN
Qty: 60 TABLET | Refills: 1 | Status: SHIPPED | OUTPATIENT
Start: 2025-02-26 | End: 2025-05-27

## 2025-02-26 RX ORDER — GABAPENTIN 100 MG/1
CAPSULE ORAL
Qty: 63 CAPSULE | Refills: 0 | Status: SHIPPED | OUTPATIENT
Start: 2025-02-26 | End: 2025-03-13

## 2025-02-26 RX ORDER — LIDOCAINE HYDROCHLORIDE 10 MG/ML
4 INJECTION, SOLUTION INFILTRATION; PERINEURAL ONCE
Status: COMPLETED | OUTPATIENT
Start: 2025-02-26 | End: 2025-02-26

## 2025-02-26 RX ORDER — TRIAMCINOLONE ACETONIDE 40 MG/ML
40 INJECTION, SUSPENSION INTRA-ARTICULAR; INTRAMUSCULAR ONCE
Status: COMPLETED | OUTPATIENT
Start: 2025-02-26 | End: 2025-02-26

## 2025-02-26 RX ORDER — ZOLMITRIPTAN 5 MG/1
TABLET, FILM COATED ORAL
Qty: 12 TABLET | Refills: 5 | Status: SHIPPED | OUTPATIENT
Start: 2025-02-26

## 2025-02-26 RX ORDER — METOPROLOL TARTRATE 25 MG/1
25 TABLET, FILM COATED ORAL 2 TIMES DAILY
COMMUNITY

## 2025-02-26 RX ADMIN — LIDOCAINE HYDROCHLORIDE 4 ML: 10 INJECTION, SOLUTION INFILTRATION; PERINEURAL at 15:25

## 2025-02-26 RX ADMIN — TRIAMCINOLONE ACETONIDE 40 MG: 40 INJECTION, SUSPENSION INTRA-ARTICULAR; INTRAMUSCULAR at 15:26

## 2025-03-03 ENCOUNTER — OFFICE VISIT (OUTPATIENT)
Dept: PHYSICAL MEDICINE AND REHAB | Age: 60
End: 2025-03-03
Payer: COMMERCIAL

## 2025-03-03 VITALS
HEART RATE: 93 BPM | HEIGHT: 62 IN | WEIGHT: 123 LBS | BODY MASS INDEX: 22.63 KG/M2 | DIASTOLIC BLOOD PRESSURE: 85 MMHG | TEMPERATURE: 97.2 F | SYSTOLIC BLOOD PRESSURE: 135 MMHG

## 2025-03-03 DIAGNOSIS — M54.81 BILATERAL OCCIPITAL NEURALGIA: Primary | ICD-10-CM

## 2025-03-03 PROCEDURE — 64405 NJX AA&/STRD GR OCPL NRV: CPT | Performed by: PHYSICAL MEDICINE & REHABILITATION

## 2025-03-03 RX ORDER — BUPIVACAINE HYDROCHLORIDE 2.5 MG/ML
4 INJECTION, SOLUTION INFILTRATION; PERINEURAL ONCE
Status: COMPLETED | OUTPATIENT
Start: 2025-03-03 | End: 2025-03-03

## 2025-03-03 RX ADMIN — BUPIVACAINE HYDROCHLORIDE 10 MG: 2.5 INJECTION, SOLUTION INFILTRATION; PERINEURAL at 14:50

## 2025-03-05 ENCOUNTER — APPOINTMENT (OUTPATIENT)
Dept: NEUROLOGY | Facility: CLINIC | Age: 60
End: 2025-03-05
Payer: COMMERCIAL

## 2025-03-07 ENCOUNTER — OFFICE VISIT (OUTPATIENT)
Dept: PHYSICAL MEDICINE AND REHAB | Age: 60
End: 2025-03-07
Payer: COMMERCIAL

## 2025-03-07 DIAGNOSIS — G43.709 CHRONIC MIGRAINE WITHOUT AURA WITHOUT STATUS MIGRAINOSUS, NOT INTRACTABLE: Primary | ICD-10-CM

## 2025-03-07 PROCEDURE — 99211 OFF/OP EST MAY X REQ PHY/QHP: CPT | Performed by: PHYSICAL MEDICINE & REHABILITATION

## 2025-03-07 PROCEDURE — 96372 THER/PROPH/DIAG INJ SC/IM: CPT | Performed by: PHYSICAL MEDICINE & REHABILITATION

## 2025-03-07 RX ORDER — KETOROLAC TROMETHAMINE 15 MG/ML
15 INJECTION, SOLUTION INTRAMUSCULAR; INTRAVENOUS ONCE
Status: COMPLETED | OUTPATIENT
Start: 2025-03-07 | End: 2025-03-07

## 2025-03-07 RX ADMIN — KETOROLAC TROMETHAMINE 15 MG: 15 INJECTION, SOLUTION INTRAMUSCULAR; INTRAVENOUS at 15:11

## 2025-03-07 NOTE — PROGRESS NOTES
Patient here for a torodol 15mg IM x one dose injection for migraine, pain is describing it as a sharp pain on top of head region, torodol 15mg IM given right deltoid.

## 2025-03-14 ENCOUNTER — PATIENT MESSAGE (OUTPATIENT)
Dept: PHYSICAL MEDICINE AND REHAB | Age: 60
End: 2025-03-14

## 2025-03-14 RX ORDER — GABAPENTIN 100 MG/1
CAPSULE ORAL
Qty: 63 CAPSULE | Refills: 0 | Status: CANCELLED | OUTPATIENT
Start: 2025-03-14 | End: 2025-03-29

## 2025-03-14 NOTE — TELEPHONE ENCOUNTER
Spoke with  and [facility], spoke to pharmacist [place] GAMEZ Called in gabapentin 300mg capsule one three times a day, quantity 51  enough until seen at her appointment April 2 2025,  called patient to let her know this, verbalizes understanding.

## 2025-03-14 NOTE — PROGRESS NOTES
Solange Mason D.O.  [facility]  [address] [place] [address]  Phone: [phone]  Fax: [phone]    4/7/2025    Chief Complaint   Patient presents with    Neck Pain     Occipital nerve blocks       Last injection: 3/3/2025  Taking anticoagulants/antiplatelets: No  Diabetic: No  Febrile/active infection: No    Ambulatory Procedure Time Out  Correct Patient: Yes  Correct Procedure: Yes  Correct Site/Side: Yes  Correct Site(s) Marked: Yes  Informed Consent Signed: Yes  Allergies Verified: Yes  Staff Present & Credential:: BECKI Maciel DO    Diagnosis:  1. Bilateral occipital neuralgia    2. Chronic migraine without aura without status migrainosus, not intractable            After explaining the indications, risks, benefits and alternatives of a Bilateral occipital nerve block, the patient agreed to proceed.  A permit was signed and scanned into the media. The patient was placed in the seated position. The skin was prepared with alcohol.  Using an aseptic, no touch technique, a 25 gauge, 5/8\" needle with 2 cc of Bupivicaine 0.25% was directed to the occipital notch at the point of maximal tenderness. After negative aspiration, the medication was injected was injected.    Adequate hemostasis was obtained. The patient tolerated the procedure well and was educated in post injection care.  The patient was monitored clinically and left the office without incident. Pain was reduced post-injection.       Solange Mason D.O., P.T.  Board Certified Physical Medicine and Rehabilitation  Board Certified Electrodiagnostic Medicine    Administrations This Visit       BUPivacaine (MARCAINE) 0.25 % injection 10 mg       Admin Date  04/07/2025  07:51 Action  Given Dose  10 mg Route  IntraDERmal Site  Other Documented By  Darling Neri MA    NDC: 3592-7371-53    Lot#: [id number]    : HOSPIRA    Patient Supplied?: No

## 2025-03-14 NOTE — TELEPHONE ENCOUNTER
Patient will be out of her gabapentin on Sunday, she is now on the 3 capsules three times a day which totals 900mg, I can't change the sig so this needs fixed with a new prescription sent. The OARRS report was obtained and provided for the physician to review today. It was Appropriate and revealed appropriate prescriptions without multiple providers, altered prescriptions, or early refills.  Please advise thanks

## 2025-03-17 ENCOUNTER — PATIENT MESSAGE (OUTPATIENT)
Dept: PHYSICAL MEDICINE AND REHAB | Age: 60
End: 2025-03-17

## 2025-03-20 ENCOUNTER — OFFICE VISIT (OUTPATIENT)
Dept: PHYSICAL MEDICINE AND REHAB | Age: 60
End: 2025-03-20
Payer: COMMERCIAL

## 2025-03-20 VITALS — TEMPERATURE: 97.6 F

## 2025-03-20 DIAGNOSIS — G43.709 CHRONIC MIGRAINE WITHOUT AURA WITHOUT STATUS MIGRAINOSUS, NOT INTRACTABLE: ICD-10-CM

## 2025-03-20 DIAGNOSIS — M79.609 PAIN IN EXTREMITY, UNSPECIFIED EXTREMITY: Primary | ICD-10-CM

## 2025-03-20 PROCEDURE — 99211 OFF/OP EST MAY X REQ PHY/QHP: CPT | Performed by: PHYSICAL MEDICINE & REHABILITATION

## 2025-03-21 PROCEDURE — 96372 THER/PROPH/DIAG INJ SC/IM: CPT | Performed by: PHYSICAL MEDICINE & REHABILITATION

## 2025-03-21 RX ORDER — KETOROLAC TROMETHAMINE 15 MG/ML
15 INJECTION, SOLUTION INTRAMUSCULAR; INTRAVENOUS ONCE
Status: COMPLETED | OUTPATIENT
Start: 2025-03-21 | End: 2025-03-21

## 2025-03-21 RX ADMIN — KETOROLAC TROMETHAMINE 15 MG: 15 INJECTION, SOLUTION INTRAMUSCULAR; INTRAVENOUS at 13:08

## 2025-04-02 ENCOUNTER — OFFICE VISIT (OUTPATIENT)
Dept: PHYSICAL MEDICINE AND REHAB | Age: 60
End: 2025-04-02
Payer: COMMERCIAL

## 2025-04-02 VITALS
WEIGHT: 122 LBS | BODY MASS INDEX: 22.45 KG/M2 | DIASTOLIC BLOOD PRESSURE: 88 MMHG | TEMPERATURE: 97.2 F | HEIGHT: 62 IN | SYSTOLIC BLOOD PRESSURE: 145 MMHG | HEART RATE: 90 BPM

## 2025-04-02 DIAGNOSIS — M54.81 BILATERAL OCCIPITAL NEURALGIA: Primary | ICD-10-CM

## 2025-04-02 DIAGNOSIS — G43.709 CHRONIC MIGRAINE WITHOUT AURA WITHOUT STATUS MIGRAINOSUS, NOT INTRACTABLE: ICD-10-CM

## 2025-04-02 PROCEDURE — 64405 NJX AA&/STRD GR OCPL NRV: CPT | Performed by: PHYSICAL MEDICINE & REHABILITATION

## 2025-04-02 RX ORDER — RIFAXIMIN 550 MG/1
550 TABLET ORAL 3 TIMES DAILY
COMMUNITY
Start: 2025-03-21

## 2025-04-03 RX ORDER — GABAPENTIN 300 MG/1
CAPSULE ORAL
Status: CANCELLED | OUTPATIENT
Start: 2025-04-03 | End: 2025-05-03

## 2025-04-05 RX ORDER — PROMETHAZINE HYDROCHLORIDE 25 MG/1
25 TABLET ORAL 4 TIMES DAILY PRN
Qty: 60 TABLET | Refills: 1 | Status: SHIPPED | OUTPATIENT
Start: 2025-04-05 | End: 2025-07-04

## 2025-04-05 RX ORDER — BUPIVACAINE HYDROCHLORIDE 2.5 MG/ML
4 INJECTION, SOLUTION INFILTRATION; PERINEURAL ONCE
Status: COMPLETED | OUTPATIENT
Start: 2025-04-05 | End: 2025-04-07

## 2025-04-07 RX ADMIN — BUPIVACAINE HYDROCHLORIDE 10 MG: 2.5 INJECTION, SOLUTION INFILTRATION; PERINEURAL at 07:51

## 2025-04-16 NOTE — PROGRESS NOTES
Solange Mason D.O.  [facility]  [address] [place] [address]  Phone: [phone]  Fax: [phone]    5/12/2025    Chief Complaint   Patient presents with    Migraine     Botox 200 units migraines       Informed Consent:  The indications, risks, benefits, and  alternatives of onabotulinum toxin A were discussed with the patient. I explained to the patient the potential side effects including but not limited to: distant spread of toxin effect causing droopy eyelids, facial drooping, neck pain, headache, double vision, muscle pain/spasm/weakness/stiffness,  bronchitis,  injection site pain, increased blood pressure, hypersensitivity, anaphylaxis, difficulty swallowing, difficulty speaking, urinary incontinence and breathing difficulty.  The patient is aware that swallowing and breathing difficulties can be life threatening and there have been reports of death in some cases where onabotulinum toxin was injected.   The patient was advised of the expected benefit to include less headache days per month, and the anticipated duration of effect of 3 months. A permit was signed and scanned into the media.    Last injection: 2/10/2025  Taking anticoagulants/antiplatelets: No  Diabetic: No  Febrile/active infection: No    Ambulatory Procedure Time Out  Correct Patient: Yes  Correct Procedure: Yes  Correct Site/Side: Yes  Correct Site(s) Marked: Yes  Informed Consent Signed: Yes  Allergies Verified: Yes  Staff Present & Credential:: Rody Kahn/    Diagnosis:  1. Chronic migraine without aura without status migrainosus, not intractable              Procedure note: After obtaining verbal and written consent from the patient for injection of  onabotulinum toxin A the patient agreed to proceed.    200 units of onabotulinum toxin A was reconstituted using 4 cc of preservative free normal saline ( 5 units per 0.1 ml).  The medication was injected using a 30 g

## 2025-04-18 LAB
25(OH)D3 SERPL-MCNC: 34.9 NG/ML (ref 30–100)
ALBUMIN SERPL-MCNC: 4.1 G/DL (ref 3.5–5.2)
ALP SERPL-CCNC: 63 U/L (ref 35–104)
ALT SERPL-CCNC: 18 U/L (ref 0–35)
ANION GAP SERPL CALCULATED.3IONS-SCNC: 10 MMOL/L (ref 7–16)
AST SERPL-CCNC: 32 U/L (ref 0–35)
BILIRUB SERPL-MCNC: 0.2 MG/DL (ref 0–1.2)
BUN SERPL-MCNC: 8 MG/DL (ref 6–20)
CALCIUM SERPL-MCNC: 9.1 MG/DL (ref 8.6–10)
CHLORIDE SERPL-SCNC: 98 MMOL/L (ref 98–107)
CHOLEST SERPL-MCNC: 176 MG/DL
CO2 SERPL-SCNC: 27 MMOL/L (ref 22–29)
CREAT SERPL-MCNC: 0.7 MG/DL (ref 0.5–1)
ERYTHROCYTE [DISTWIDTH] IN BLOOD BY AUTOMATED COUNT: 13.8 % (ref 11.5–15)
GFR, ESTIMATED: >90 ML/MIN/1.73M2
GLUCOSE SERPL-MCNC: 76 MG/DL (ref 74–99)
HCT VFR BLD AUTO: 44.2 % (ref 34–48)
HDLC SERPL-MCNC: 80 MG/DL
HGB BLD-MCNC: 14.4 G/DL (ref 11.5–15.5)
LDLC SERPL CALC-MCNC: 81 MG/DL
MCH RBC QN AUTO: 30.2 PG (ref 26–35)
MCHC RBC AUTO-ENTMCNC: 32.6 G/DL (ref 32–34.5)
MCV RBC AUTO: 92.7 FL (ref 80–99.9)
PLATELET # BLD AUTO: 381 K/UL (ref 130–450)
PMV BLD AUTO: 9.3 FL (ref 7–12)
POTASSIUM SERPL-SCNC: 3.3 MMOL/L (ref 3.5–5.1)
PROT SERPL-MCNC: 6.3 G/DL (ref 6.4–8.3)
RBC # BLD AUTO: 4.77 M/UL (ref 3.5–5.5)
SODIUM SERPL-SCNC: 135 MMOL/L (ref 136–145)
TRIGL SERPL-MCNC: 73 MG/DL
TSH SERPL DL<=0.05 MIU/L-ACNC: 0.74 UIU/ML (ref 0.27–4.2)
VLDLC SERPL CALC-MCNC: 15 MG/DL
WBC OTHER # BLD: 6.7 K/UL (ref 4.5–11.5)

## 2025-05-12 ENCOUNTER — OFFICE VISIT (OUTPATIENT)
Dept: PHYSICAL MEDICINE AND REHAB | Age: 60
End: 2025-05-12
Payer: COMMERCIAL

## 2025-05-12 VITALS
WEIGHT: 119 LBS | HEART RATE: 115 BPM | TEMPERATURE: 97.5 F | HEIGHT: 62 IN | DIASTOLIC BLOOD PRESSURE: 68 MMHG | SYSTOLIC BLOOD PRESSURE: 136 MMHG | BODY MASS INDEX: 21.9 KG/M2

## 2025-05-12 DIAGNOSIS — G43.709 CHRONIC MIGRAINE WITHOUT AURA WITHOUT STATUS MIGRAINOSUS, NOT INTRACTABLE: ICD-10-CM

## 2025-05-12 PROCEDURE — 64615 CHEMODENERV MUSC MIGRAINE: CPT | Performed by: PHYSICAL MEDICINE & REHABILITATION

## 2025-05-12 RX ORDER — PROMETHAZINE HYDROCHLORIDE 25 MG/1
25 TABLET ORAL 4 TIMES DAILY PRN
Qty: 60 TABLET | Refills: 1 | Status: SHIPPED | OUTPATIENT
Start: 2025-05-12 | End: 2025-08-10

## 2025-05-13 NOTE — PROGRESS NOTES
Solange Mason D.O.  [facility]  [address] [place] [address]  Phone: [phone]  Fax: [phone]    5/15/2025    Chief Complaint   Patient presents with    Neck Pain     Occipital nerve block       Last injection: 4/2/2025  Taking anticoagulants/antiplatelets: No  Diabetic: No  Febrile/active infection: No    Ambulatory Procedure Time Out  Correct Patient: Yes  Correct Procedure: Yes  Correct Site/Side: Yes  Correct Site(s) Marked: Yes  Informed Consent Signed: Yes  Allergies Verified: Yes  Staff Present & Credential:: BECKI Maciel DO    Diagnosis:  1. Bilateral occipital neuralgia              After explaining the indications, risks, benefits and alternatives of a Bilateral occipital nerve block, the patient agreed to proceed.  A permit was signed and scanned into the media. The patient was placed in the seated position. The skin was prepared with alcohol.  Using an aseptic, no touch technique, a 25 gauge, 5/8\" needle with 2 cc of Bupivicaine 0.25% was directed to the occipital notch at the point of maximal tenderness. After negative aspiration, the medication was injected was injected.    Adequate hemostasis was obtained. The patient tolerated the procedure well and was educated in post injection care.  The patient was monitored clinically and left the office without incident. Pain was reduced post-injection.       Solange Mason D.O., P.T.  Board Certified Physical Medicine and Rehabilitation  Board Certified Electrodiagnostic Medicine    Administrations This Visit       BUPivacaine (MARCAINE) 0.25 % injection 10 mg       Admin Date  05/14/2025  16:39 Action  Given Dose  10 mg Route  IntraDERmal Site  Other Documented By  Barby Hamilton LPN    NDC: 1987-8690-31    Lot#: [id number]    : HOSPIRA    Patient Supplied?: No

## 2025-05-14 ENCOUNTER — OFFICE VISIT (OUTPATIENT)
Dept: PHYSICAL MEDICINE AND REHAB | Age: 60
End: 2025-05-14

## 2025-05-14 VITALS
HEIGHT: 62 IN | TEMPERATURE: 97.4 F | DIASTOLIC BLOOD PRESSURE: 86 MMHG | BODY MASS INDEX: 22.08 KG/M2 | SYSTOLIC BLOOD PRESSURE: 132 MMHG | HEART RATE: 102 BPM | WEIGHT: 120 LBS

## 2025-05-14 DIAGNOSIS — M54.81 BILATERAL OCCIPITAL NEURALGIA: Primary | ICD-10-CM

## 2025-05-14 RX ORDER — DEXAMETHASONE SODIUM PHOSPHATE 4 MG/ML
4 INJECTION, SOLUTION INTRA-ARTICULAR; INTRALESIONAL; INTRAMUSCULAR; INTRAVENOUS; SOFT TISSUE ONCE
Qty: 1 ML | Refills: 0
Start: 2025-05-14 | End: 2025-05-14

## 2025-05-14 RX ORDER — BUPIVACAINE HYDROCHLORIDE 2.5 MG/ML
4 INJECTION, SOLUTION INFILTRATION; PERINEURAL ONCE
Status: COMPLETED | OUTPATIENT
Start: 2025-05-14 | End: 2025-05-14

## 2025-05-14 RX ADMIN — BUPIVACAINE HYDROCHLORIDE 10 MG: 2.5 INJECTION, SOLUTION INFILTRATION; PERINEURAL at 16:39

## 2025-05-19 RX ORDER — SODIUM CHLORIDE 9 MG/ML
5-250 INJECTION, SOLUTION INTRAVENOUS PRN
OUTPATIENT
Start: 2025-05-19

## 2025-05-19 RX ORDER — MAGNESIUM SULFATE 1 G/100ML
1000 INJECTION INTRAVENOUS ONCE
OUTPATIENT
Start: 2025-05-19 | End: 2025-05-19

## 2025-05-19 RX ORDER — SODIUM CHLORIDE 0.9 % (FLUSH) 0.9 %
5-40 SYRINGE (ML) INJECTION PRN
OUTPATIENT
Start: 2025-05-19

## 2025-05-19 RX ORDER — KETOROLAC TROMETHAMINE 30 MG/ML
30 INJECTION, SOLUTION INTRAMUSCULAR; INTRAVENOUS
OUTPATIENT
Start: 2025-05-19

## 2025-05-19 RX ORDER — DEXAMETHASONE SODIUM PHOSPHATE 4 MG/ML
4 INJECTION, SOLUTION INTRA-ARTICULAR; INTRALESIONAL; INTRAMUSCULAR; INTRAVENOUS; SOFT TISSUE ONCE
Start: 2025-05-19 | End: 2025-05-19

## 2025-05-19 RX ORDER — DIPHENHYDRAMINE HYDROCHLORIDE 50 MG/ML
50 INJECTION, SOLUTION INTRAMUSCULAR; INTRAVENOUS ONCE
Start: 2025-05-19 | End: 2025-05-19

## 2025-05-29 NOTE — PROGRESS NOTES
Solange Mason D.O.  [facility]  [address] [place] [address]  Phone: [phone]  Fax: [phone]    6/17/2025    Chief Complaint   Patient presents with    Headache    Neck Pain     Occipital nerve block       Last injection: 5/14/2025  Taking anticoagulants/antiplatelets: No  Diabetic: No  Febrile/active infection: No    Ambulatory Procedure Time Out  Correct Patient: Yes  Correct Procedure: Yes  Correct Site/Side: Yes  Correct Site(s) Marked: Yes  Informed Consent Signed: Yes  Allergies Verified: Yes  Staff Present & Credential:: BECKI Maciel DO    Diagnosis:  1. Bilateral occipital neuralgia                After explaining the indications, risks, benefits and alternatives of a Bilateral occipital nerve block, the patient agreed to proceed.  A permit was signed and scanned into the media. The patient was placed in the seated position. The skin was prepared with alcohol.  Using an aseptic, no touch technique, a 25 gauge, 5/8\" needle with 2 cc of Bupivicaine 0.25% was directed to the occipital notch at the point of maximal tenderness. After negative aspiration, the medication was injected was injected.    Adequate hemostasis was obtained. The patient tolerated the procedure well and was educated in post injection care.  The patient was monitored clinically and left the office without incident. Pain was reduced post-injection.       Solange Mason D.O., P.T.  Board Certified Physical Medicine and Rehabilitation  Board Certified Electrodiagnostic Medicine    Administrations This Visit       BUPivacaine (MARCAINE) 0.25 % injection 75 mg       Admin Date  06/17/2025  07:47 Action  Given Dose  75 mg Route  IntraDERmal Site  Other Documented By  Rody Ambriz RN    NDC: 9794-0753-18    Lot#: [id number]    : HOSPIRA    Patient Supplied?: No

## 2025-06-13 ENCOUNTER — OFFICE VISIT (OUTPATIENT)
Dept: PHYSICAL MEDICINE AND REHAB | Age: 60
End: 2025-06-13

## 2025-06-13 VITALS
DIASTOLIC BLOOD PRESSURE: 80 MMHG | HEIGHT: 62 IN | SYSTOLIC BLOOD PRESSURE: 116 MMHG | TEMPERATURE: 97.3 F | BODY MASS INDEX: 21.9 KG/M2 | WEIGHT: 119 LBS | HEART RATE: 98 BPM

## 2025-06-13 DIAGNOSIS — M54.81 BILATERAL OCCIPITAL NEURALGIA: Primary | ICD-10-CM

## 2025-06-13 RX ORDER — BUPIVACAINE HYDROCHLORIDE 2.5 MG/ML
30 INJECTION, SOLUTION INFILTRATION; PERINEURAL ONCE
Status: COMPLETED | OUTPATIENT
Start: 2025-06-13 | End: 2025-06-17

## 2025-06-13 RX ORDER — CEFDINIR 300 MG/1
300 CAPSULE ORAL 2 TIMES DAILY
COMMUNITY
Start: 2025-06-06

## 2025-06-17 RX ADMIN — BUPIVACAINE HYDROCHLORIDE 75 MG: 2.5 INJECTION, SOLUTION INFILTRATION; PERINEURAL at 07:47

## 2025-06-20 DIAGNOSIS — G43.709 CHRONIC MIGRAINE WITHOUT AURA WITHOUT STATUS MIGRAINOSUS, NOT INTRACTABLE: ICD-10-CM

## 2025-06-20 RX ORDER — PROMETHAZINE HYDROCHLORIDE 25 MG/1
25 TABLET ORAL 4 TIMES DAILY PRN
Qty: 60 TABLET | Refills: 1 | Status: SHIPPED | OUTPATIENT
Start: 2025-06-20 | End: 2025-09-18

## 2025-07-09 ENCOUNTER — TELEPHONE (OUTPATIENT)
Dept: PHYSICAL MEDICINE AND REHAB | Age: 60
End: 2025-07-09

## 2025-08-07 ENCOUNTER — OFFICE VISIT (OUTPATIENT)
Dept: PHYSICAL MEDICINE AND REHAB | Age: 60
End: 2025-08-07

## 2025-08-07 VITALS
HEIGHT: 62 IN | HEART RATE: 83 BPM | WEIGHT: 118 LBS | BODY MASS INDEX: 21.71 KG/M2 | SYSTOLIC BLOOD PRESSURE: 123 MMHG | TEMPERATURE: 97.1 F | DIASTOLIC BLOOD PRESSURE: 75 MMHG

## 2025-08-07 DIAGNOSIS — M54.81 BILATERAL OCCIPITAL NEURALGIA: Primary | ICD-10-CM

## 2025-08-07 LAB
BACTERIA URNS QL MICRO: ABNORMAL
BILIRUB UR QL STRIP: NEGATIVE
CLARITY UR: CLEAR
COLOR UR: YELLOW
GLUCOSE UR STRIP-MCNC: NEGATIVE MG/DL
HGB UR QL STRIP.AUTO: ABNORMAL
KETONES UR STRIP-MCNC: NEGATIVE MG/DL
LEUKOCYTE ESTERASE UR QL STRIP: ABNORMAL
NITRITE UR QL STRIP: NEGATIVE
PH UR STRIP: 7 [PH] (ref 5–8)
PROT UR STRIP-MCNC: NEGATIVE MG/DL
RBC #/AREA URNS HPF: ABNORMAL /HPF
SP GR UR STRIP: <1.005 (ref 1–1.03)
UROBILINOGEN UR STRIP-ACNC: 0.2 EU/DL (ref 0–1)
WBC #/AREA URNS HPF: ABNORMAL /HPF

## 2025-08-07 RX ORDER — GABAPENTIN 300 MG/1
300 CAPSULE ORAL 3 TIMES DAILY
COMMUNITY
Start: 2025-05-30

## 2025-08-07 RX ORDER — BUPIVACAINE HYDROCHLORIDE 2.5 MG/ML
4 INJECTION, SOLUTION INFILTRATION; PERINEURAL ONCE
Status: COMPLETED | OUTPATIENT
Start: 2025-08-07 | End: 2025-08-07

## 2025-08-07 RX ORDER — PROMETHAZINE HYDROCHLORIDE 25 MG/1
25 TABLET ORAL EVERY 6 HOURS PRN
Qty: 40 TABLET | Refills: 11 | Status: SHIPPED | OUTPATIENT
Start: 2025-08-07 | End: 2025-12-05

## 2025-08-07 RX ORDER — SUMATRIPTAN 20 MG/1
1 SPRAY NASAL DAILY PRN
Qty: 10 EACH | Refills: 11 | Status: SHIPPED | OUTPATIENT
Start: 2025-08-07

## 2025-08-07 RX ADMIN — BUPIVACAINE HYDROCHLORIDE 10 MG: 2.5 INJECTION, SOLUTION INFILTRATION; PERINEURAL at 12:45

## 2025-08-09 LAB
MICROORGANISM SPEC CULT: ABNORMAL
SPECIMEN DESCRIPTION: ABNORMAL

## 2025-08-27 DIAGNOSIS — M79.10 TRIGGER POINT: ICD-10-CM

## 2025-08-27 RX ORDER — UBROGEPANT 100 MG/1
100 TABLET ORAL DAILY PRN
Qty: 16 TABLET | Refills: 11 | Status: SHIPPED | OUTPATIENT
Start: 2025-08-27

## 2025-08-27 RX ORDER — CYCLOBENZAPRINE HCL 10 MG
10 TABLET ORAL 2 TIMES DAILY PRN
Qty: 180 TABLET | Refills: 3 | Status: SHIPPED | OUTPATIENT
Start: 2025-08-27

## 2025-09-03 ENCOUNTER — OFFICE VISIT (OUTPATIENT)
Dept: PHYSICAL MEDICINE AND REHAB | Age: 60
End: 2025-09-03

## 2025-09-03 VITALS
WEIGHT: 119 LBS | SYSTOLIC BLOOD PRESSURE: 127 MMHG | DIASTOLIC BLOOD PRESSURE: 67 MMHG | HEART RATE: 100 BPM | BODY MASS INDEX: 21.9 KG/M2 | TEMPERATURE: 97.2 F | HEIGHT: 62 IN

## 2025-09-03 DIAGNOSIS — M79.10 TRIGGER POINT: ICD-10-CM

## 2025-09-03 DIAGNOSIS — G43.709 CHRONIC MIGRAINE WITHOUT AURA WITHOUT STATUS MIGRAINOSUS, NOT INTRACTABLE: ICD-10-CM

## 2025-09-03 DIAGNOSIS — M54.81 OCCIPITAL NEURALGIA OF LEFT SIDE: Primary | ICD-10-CM

## 2025-09-03 RX ORDER — KETOROLAC TROMETHAMINE 15 MG/ML
15 INJECTION, SOLUTION INTRAMUSCULAR; INTRAVENOUS ONCE
Status: COMPLETED | OUTPATIENT
Start: 2025-09-03 | End: 2025-09-03

## 2025-09-03 RX ORDER — BUPIVACAINE HYDROCHLORIDE 2.5 MG/ML
2 INJECTION, SOLUTION INFILTRATION; PERINEURAL ONCE
Status: COMPLETED | OUTPATIENT
Start: 2025-09-03 | End: 2025-09-03

## 2025-09-03 RX ORDER — LIDOCAINE HYDROCHLORIDE 10 MG/ML
6 INJECTION, SOLUTION INFILTRATION; PERINEURAL ONCE
Status: COMPLETED | OUTPATIENT
Start: 2025-09-03 | End: 2025-09-03

## 2025-09-03 RX ADMIN — KETOROLAC TROMETHAMINE 15 MG: 15 INJECTION, SOLUTION INTRAMUSCULAR; INTRAVENOUS at 16:12

## 2025-09-03 RX ADMIN — LIDOCAINE HYDROCHLORIDE 6 ML: 10 INJECTION, SOLUTION INFILTRATION; PERINEURAL at 16:03

## 2025-09-03 RX ADMIN — BUPIVACAINE HYDROCHLORIDE 5 MG: 2.5 INJECTION, SOLUTION INFILTRATION; PERINEURAL at 16:04
